# Patient Record
Sex: MALE | Race: WHITE | NOT HISPANIC OR LATINO | Employment: UNEMPLOYED | URBAN - NONMETROPOLITAN AREA
[De-identification: names, ages, dates, MRNs, and addresses within clinical notes are randomized per-mention and may not be internally consistent; named-entity substitution may affect disease eponyms.]

---

## 2024-06-11 ENCOUNTER — OFFICE VISIT (OUTPATIENT)
Dept: UROLOGY | Facility: CLINIC | Age: 59
End: 2024-06-11
Payer: COMMERCIAL

## 2024-06-11 VITALS
BODY MASS INDEX: 37.91 KG/M2 | DIASTOLIC BLOOD PRESSURE: 114 MMHG | HEART RATE: 104 BPM | SYSTOLIC BLOOD PRESSURE: 164 MMHG | HEIGHT: 70 IN | WEIGHT: 264.8 LBS

## 2024-06-11 DIAGNOSIS — R35.0 BENIGN PROSTATIC HYPERPLASIA WITH URINARY FREQUENCY: ICD-10-CM

## 2024-06-11 DIAGNOSIS — N40.1 BENIGN PROSTATIC HYPERPLASIA WITH URINARY FREQUENCY: ICD-10-CM

## 2024-06-11 DIAGNOSIS — R35.0 FREQUENCY OF MICTURITION: ICD-10-CM

## 2024-06-11 DIAGNOSIS — N40.1 BPH WITH OBSTRUCTION/LOWER URINARY TRACT SYMPTOMS: Primary | ICD-10-CM

## 2024-06-11 DIAGNOSIS — N13.8 BPH WITH OBSTRUCTION/LOWER URINARY TRACT SYMPTOMS: Primary | ICD-10-CM

## 2024-06-11 DIAGNOSIS — R33.9 INCOMPLETE EMPTYING OF BLADDER: ICD-10-CM

## 2024-06-11 DIAGNOSIS — N41.0 ACUTE PROSTATITIS WITH HEMATURIA: ICD-10-CM

## 2024-06-11 DIAGNOSIS — N39.0 RECURRENT UTI (URINARY TRACT INFECTION): ICD-10-CM

## 2024-06-11 LAB
BILIRUB BLD-MCNC: ABNORMAL MG/DL
CLARITY, POC: CLEAR
COLOR UR: ABNORMAL
EXPIRATION DATE: ABNORMAL
GLUCOSE UR STRIP-MCNC: NEGATIVE MG/DL
KETONES UR QL: ABNORMAL
LEUKOCYTE EST, POC: ABNORMAL
Lab: ABNORMAL
NITRITE UR-MCNC: NEGATIVE MG/ML
PH UR: 7.5 [PH] (ref 5–8)
PROT UR STRIP-MCNC: ABNORMAL MG/DL
RBC # UR STRIP: ABNORMAL /UL
SP GR UR: 1.01 (ref 1–1.03)
UROBILINOGEN UR QL: NORMAL

## 2024-06-11 PROCEDURE — 84154 ASSAY OF PSA FREE: CPT | Performed by: NURSE PRACTITIONER

## 2024-06-11 PROCEDURE — 84153 ASSAY OF PSA TOTAL: CPT | Performed by: NURSE PRACTITIONER

## 2024-06-11 PROCEDURE — 87086 URINE CULTURE/COLONY COUNT: CPT | Performed by: NURSE PRACTITIONER

## 2024-06-11 RX ORDER — CEFTRIAXONE 1 G/1
1 INJECTION, POWDER, FOR SOLUTION INTRAMUSCULAR; INTRAVENOUS ONCE
Status: COMPLETED | OUTPATIENT
Start: 2024-06-11 | End: 2024-06-11

## 2024-06-11 RX ORDER — ONDANSETRON 4 MG/1
4 TABLET, FILM COATED ORAL EVERY 12 HOURS PRN
Qty: 20 TABLET | Refills: 0 | Status: SHIPPED | OUTPATIENT
Start: 2024-06-11

## 2024-06-11 RX ORDER — SEMAGLUTIDE 0.68 MG/ML
INJECTION, SOLUTION SUBCUTANEOUS
COMMUNITY
Start: 2024-05-28

## 2024-06-11 RX ORDER — AMLODIPINE BESYLATE 10 MG/1
10 TABLET ORAL DAILY
COMMUNITY
Start: 2024-03-28

## 2024-06-11 RX ORDER — SERTRALINE HYDROCHLORIDE 100 MG/1
100 TABLET, FILM COATED ORAL DAILY
COMMUNITY
Start: 2013-04-15

## 2024-06-11 RX ORDER — HYDRALAZINE HYDROCHLORIDE 50 MG/1
50 TABLET, FILM COATED ORAL 3 TIMES DAILY
COMMUNITY

## 2024-06-11 RX ORDER — METHOCARBAMOL 500 MG/1
1 TABLET, FILM COATED ORAL 3 TIMES DAILY
COMMUNITY
Start: 2024-03-17

## 2024-06-11 RX ORDER — SULFAMETHOXAZOLE AND TRIMETHOPRIM 800; 160 MG/1; MG/1
1 TABLET ORAL 2 TIMES DAILY
Qty: 56 TABLET | Refills: 0 | Status: SHIPPED | OUTPATIENT
Start: 2024-06-11 | End: 2024-07-09

## 2024-06-11 RX ADMIN — CEFTRIAXONE 1 G: 1 INJECTION, POWDER, FOR SOLUTION INTRAMUSCULAR; INTRAVENOUS at 11:32

## 2024-06-11 NOTE — PROGRESS NOTES
Chief Complaint  E'COLI  RECURRENT UTI/ACUTE CYSTITIS/LEFT RENAL STONES (NEW PATIENT)    Subjective          Celestine Arriaga presents to [facility] for E'COLI  RECURRENT UTI/ACUTE CYSTITIS/LEFT RENAL STONES  History of Present Illness   History of Present Illness  The patient is a pleasant 58-year-old male who presents to clinic today for evaluation as a new patient consult. The patient has been referred to clinic by PCP with concerns of recurrent UTIs. On initial evaluation in clinic today, he is in apparent discomfort. The patient reports he is post multiple antibiotic therapy for E. coli positive UTIs. He states his symptoms have been ongoing and recently becoming very bothersome to him. Cystoscopic evaluation was recommended. Also, evaluation for colonoscopy was recommended secondary to concerns for sigmoid colon malignant process noted on his LAST CT 10/23.    OVERALL, The patient reports a general feeling of malaise. He has been diagnosed with infections and has been prescribed three different antibiotics, including Rocephin, Cipro, and Bactrim. His last antibiotic regimen was administered on Friday. Prior to the Rocephin injection, he was on Cipro, amoxicillin, and Bactrim, however reports only minimal improvement in his symptoms.     He experiences a burning sensation during urination and a sensation of incomplete bladder emptying during urination. His urine has an unusual color. He admits to inadequate water intake, primarily consuming beer and half a pot of coffee. He also reports perineal pain and lower back pain with increased difficulty maintaining any comfortable sitting position consistent with sepsis prostatitis.     He has previously undergone prostate examination and has undergone several cystoscopy procedures, both of which yielded normal results. He was not informed of any prostate enlargement. He has also undergone a colonoscopy, which yielded normal  "findings. He was diagnosed with diverticulitis.     He quantifies his current pain level as 10 out of 10. He has experienced nausea and is currently on a probiotic regimen.     His brother had kidney cancer. He is not aware of any family history of prostate cancer.    CT Abdomen Pelvis With Contrast 10/13/23  Complicated acute mid sigmoid colon diverticulitis with mild to moderate  surrounding inflammatory fat stranding. There is focal inflammatory fat  stranding between the inflamed mid sigmoid colon and adjacent urinary  bladder, concerning for possible colovesical fistula or secondary focal  urinary bladder inflammation/Cystitis. There is small rim-enhancing  fluid collection between the sigmoid colon and urinary bladder  (measuring up to 1.8 cm), best seen on series 604, image 57; series4,  image 84), concerning for small contained perforation/abscess or part of  the colovesical fistula. Underlying sigmoid color malignant process  cannot be excluded. Post-treatment colonoscopy evaluation is  recommended.    Nonobstructing left nephrolithiasis. No suspicious renal lesions.  Diffuse urinary bladder wall thickening, suggestive of cystitis, which  may be secondary to adjacent sigmoid diverticulitis/possible colovesical  fistula. Focal urinary bladder mass can not be excluded, post treatment  cystoscopic correlation is recommended.    Active Ambulatory Problems     Diagnosis Date Noted    Frequency of micturition 06/13/2024    Recurrent UTI (urinary tract infection) 06/13/2024    Acute prostatitis with hematuria 06/13/2024    Incomplete emptying of bladder 06/13/2024     Resolved Ambulatory Problems     Diagnosis Date Noted    No Resolved Ambulatory Problems     Past Medical History:   Diagnosis Date    Hypertension     Urinary tract infection       Objective   Vital Signs:   BP (!) 164/114   Pulse 104   Ht 177.8 cm (70\")   Wt 120 kg (264 lb 12.8 oz)   BMI 37.99 kg/m²       ROS:   Review of Systems "   Constitutional:  Positive for activity change, appetite change, chills, fatigue and unexpected weight gain. Negative for diaphoresis, fever and unexpected weight loss.   HENT:  Positive for nosebleeds. Negative for congestion, ear discharge, ear pain, rhinorrhea, sinus pressure and sore throat.    Eyes:  Negative for blurred vision, double vision, photophobia, pain, redness and visual disturbance.   Respiratory:  Negative for apnea, cough, chest tightness, shortness of breath, wheezing and stridor.    Cardiovascular:  Negative for chest pain and palpitations.   Gastrointestinal:  Negative for abdominal distention, abdominal pain, constipation, diarrhea, nausea and vomiting.   Endocrine: Negative for polydipsia, polyphagia and polyuria.   Genitourinary:  Positive for difficulty urinating, dysuria, flank pain, frequency, genital sores, hematuria, nocturia and urgency. Negative for decreased urine volume, discharge, penile pain, penile swelling, scrotal swelling, testicular pain and urinary incontinence.   Musculoskeletal:  Negative for arthralgias, back pain and joint swelling.   Skin:  Positive for color change and dry skin. Negative for pallor, rash and wound.   Neurological:  Negative for dizziness, tremors, syncope, weakness, light-headedness, memory problem and confusion.   Psychiatric/Behavioral:  Positive for sleep disturbance and stress. Negative for agitation, behavioral problems and decreased concentration.         Physical Exam  Constitutional:       General: He is in acute distress.      Appearance: He is well-developed. He is obese. He is ill-appearing.   HENT:      Head: Normocephalic and atraumatic.      Right Ear: External ear normal.      Left Ear: External ear normal.   Eyes:      General:         Right eye: No discharge.         Left eye: No discharge.      Conjunctiva/sclera: Conjunctivae normal.      Pupils: Pupils are equal, round, and reactive to light.   Neck:      Thyroid: No thyromegaly.       Trachea: No tracheal deviation.   Cardiovascular:      Rate and Rhythm: Normal rate and regular rhythm.      Heart sounds: No murmur heard.     No friction rub.   Pulmonary:      Effort: Pulmonary effort is normal. No respiratory distress.      Breath sounds: Normal breath sounds. No stridor.   Abdominal:      General: Bowel sounds are normal. There is no distension.      Palpations: Abdomen is soft.      Tenderness: There is no abdominal tenderness. There is no guarding.   Genitourinary:     Penis: Normal and uncircumcised. No tenderness or discharge.       Testes: Normal.      Rectum: Normal. Guaiac result negative.      Comments: CHAGO DEFERRED PER PT-Reports PERINEAL PAIN, dysuria, urinary frequency, urgency, constant burning with urination, incomplete bladder emptying.  Musculoskeletal:         General: No tenderness or deformity. Normal range of motion.      Cervical back: Normal range of motion and neck supple.   Skin:     General: Skin is warm and dry.      Capillary Refill: Capillary refill takes less than 2 seconds.      Coloration: Skin is not pale.   Neurological:      Mental Status: He is alert and oriented to person, place, and time.      Cranial Nerves: No cranial nerve deficit.      Coordination: Coordination normal.   Psychiatric:         Behavior: Behavior normal.         Thought Content: Thought content normal.         Judgment: Judgment normal.        Physical Exam    Result Review :     UA          6/11/2024    10:36   Urinalysis   Ketones, UA Trace    Leukocytes, UA Moderate (2+)      Urine Culture          6/11/2024    10:36   Urine Culture   Urine Culture No growth      PSA          6/11/2024    11:23   PSA   PSA 0.8             Assessment and Plan    Problem List Items Addressed This Visit          Genitourinary and Reproductive     Frequency of micturition - Primary    Relevant Medications    sulfamethoxazole-trimethoprim (Bactrim DS) 800-160 MG per tablet    ondansetron (Zofran) 4 MG  tablet    Other Relevant Orders    POC Urinalysis Dipstick, Automated (Completed)    Urine Culture - Urine, Urine, Random Void (Completed)    PSA Total+% Free (Serial) (Completed)    Recurrent UTI (urinary tract infection)    Relevant Medications    sulfamethoxazole-trimethoprim (Bactrim DS) 800-160 MG per tablet    ondansetron (Zofran) 4 MG tablet    Other Relevant Orders    PSA Total+% Free (Serial) (Completed)    Acute prostatitis with hematuria    Relevant Medications    sulfamethoxazole-trimethoprim (Bactrim DS) 800-160 MG per tablet    ondansetron (Zofran) 4 MG tablet    Other Relevant Orders    PSA Total+% Free (Serial) (Completed)    Incomplete emptying of bladder    Relevant Orders    Bladder Scan (Completed)       Assessment & Plan      ASSESSMENT  REC UTIs/ACUTE CYSTITIS/PROSTATITIS/BPH WITH LUTS:DYSURIA/FREQUENCY/URGENCY  MR TEDDY COOK  a pleasant 58-year-old male who presents to clinic today for evaluation with concerns of recurrent UTIs. On initial evaluation in clinic today, he is in apparent discomfort. EVEN THOUGH HE reports he is post multiple antibiotic therapy for E. coli positive UTIs    The patient reports a general feeling of malaise. He has been diagnosed with infections and has been prescribed three different antibiotics, including Rocephin, Cipro, and Bactrim. His last antibiotic regimen was administered on Friday. Prior to the Rocephin injection, he was on Cipro, amoxicillin, and Bactrim, however reports only minimal improvement in his symptoms. He experiences a burning sensation during urination and a sensation of incomplete bladder emptying during urination. His urine has an unusual color. He admits to inadequate water intake, primarily consuming beer and half a pot of coffee. He also reports perineal pain and lower back pain with increased difficulty maintaining any comfortable sitting position consistent with sepsis prostatitis.  His urinalysis in clinic today showed 2+ leukocyte  esterase, it is negative for nitrite, it is negative for gross but show trace microscopic hematuria, 1+ protein, 1+ bilirubin, ketones.  His PVR is 0 with an IPSS score of 16    BPH: WE Discussed the pathophysiology of BPH and obstruction. We discussed the static and dynamic effects of BPH as well as using 5 alpha reductase inhibitors versus alpha blockade.  We discussed the indications for transurethral surgery as well.  And/ or other therapeutic options available including all of the newer techniques.  He has no real symptomatology referable to his prostate other than moderate enlargement.  Rather than proceed with an expensive workup he doesn't need, at this time I am recommending an alpha blocker tamsulosin 0.4 mg capsule daily, and alpha reductase inhibitor-finasteride 5 mg daily    WE Discussed maximal medical therapy with finasteride and tamsulosin and how each medication works I explained that finasteride would reduce the size of the prostate by 20-30% reduce his PSA by 50% reduce the risks of either surgery or urinary retention by 50% and may reduce the risk of prostate cancer well-differentiated by 20-30% however he may increase the risk of poorly differentiated prostate cancer by half to 1%.  I also explained how  Flomax relaxes the prostate, and can often cause retrograde ejaculation.  Nevertheless, I am recommending that he start both these medications and return to see us  for follow-up.    We discussed the differential diagnosis of prostatitis including the National Institutes of Health classification system I through IV.  I discussed that type I prostatitis as  acute bacterial prostatitis and an associated with high fevers typically hematuria and severe pain with voiding.  We discussed the fact that it necessitates 6 weeks of chronic antibiotics to be sure it doesn't turn into a chronic bacterial prostatitis that can have lifelong ramifications.  We discussed National Institutes of Health type II  chronic bacterial prostatitis.  We discussed the fact that this a chronic bacterial infection of the prostate that often requires suppressive antibiotics. We discussed type III being related more to nonspecific urethritis as well as tight for being related to finding inflammation and a biopsy in the absence of symptomatology.  I discussed the diagnostic strategies including the VB 1 through 4 urine culture and discussed empiric therapy.    Recurrent urinary tract infections/OverActiveBladder:  He is in apparent distress and reports FEELING UNWELL.  We discussed the types of organisms that are found in the urinary tract indicating that the vast majority are results of the patient's own gastrointestinal daryn.  We discussed how many of the antibiotics that are utilized can actually exacerbate these infections by creating resistant organisms and there is only a very few antibiotics that are concentrated in the urine and do not affect the rectal reservoir nor cause recurrent yeast vaginitis.      We discussed the risk factors for recurrent infections being intercourse in younger patients and atrophic changes in older patients.  We discussed the symptoms that are found including pain, pressure, burning, frequency, urgency suprapubic pain and painful intercourse.  I discussed upper tract symptoms including fevers, chills, and indicated the workup would be much more aggressive if the patient were to present with recurrent infections in the face of upper tract symptomatology such as fever. I discussed the history of vesicoureteral reflux in young patients and finally chronic renal scarring as a result of such.         PLAN  We resent HIS urine for culture. I will call with results if any bacteria growth.    Will start Bactrim DS p.o. twice daily for at least 4 to 6 weeks-prostatitis     He has been encouraged to increase p.o. fluid intake to at least 1 to 2 L daily and avoid bladder irritants such as caffeine products,  spicy foods, and citrusy foods.     I recommend concomitant probiotics with treatment with antibiotics to protect the rectal reservoir including over-the-counter yogurt preparations to herbert oral pills containing the appropriate probiotics. Patient reports the diligent use of Probiotics.      Additionally, we discussed the various antibiotics used and the risks and benefits associated with each particularly the use of long-term ciprofloxacin and the effect on joints and collagen in human beings.  Discussed some home remedies for acute prostatitis and encouraged patient to engage in taking warm showers or baths as tolerated for comfort, avoiding activities that put pressure on the prostate such as bicycling, sitting on hard surfaces for longer.  Encourage sitting on a donut or  cushion, avoiding bladder irritants such as caffeine, alcohol, reducing or avoiding consumption of spicy foods, and increasing p.o. fluid intake to at least 2 to 3 L of water daily    Discussed upper and lower tract investigation.  Patient had an unremarkable CTA.  He will be scheduled for cystoscopy evaluation on 07/9/24 with Dr. Salazar-recurrent UTI/acute cystitis/BPH    Patient is agreeable plan of care, and will follow up in clinic as discussed.     1. Recurrent urinary tract infections (UTIs).BPH/PROSTATITIS SUMMARY  The differential diagnosis encompasses acute cystitis, E. coli, recurrent UTI, and acute prostatitis. Today, the patient received Rocephin 1 g intramuscularly. The initiation of antibiotic therapy tomorrow was discussed, with Bactrim to be taken twice daily for a minimum of 4 weeks, contingent upon the urine culture for self-sceptibility. A cystoscopic evaluation will be scheduled post-infection resolution with Dr. Salazar to evaluate the presence of a colovesicular fistula. Additionally, a free and total PSA test will be conducted today.    Follow-up  The patient is advised to return to the clinic sooner if  necessary.  Patient reports that he is not currently experiencing any symptoms of urinary incontinence.      Class 2 Severe Obesity (BMI >=35 and <=39.9). Obesity-related health conditions include the following: obstructive sleep apnea, hypertension, and coronary heart disease. Obesity is improving with lifestyle modifications. BMI is  37.99KGS . We discussed portion control and increasing exercise.      RADIOLOGY (CT AND/OR KUB):    CT Abdomen and Pelvis: No results found for this or any previous visit.     CT Stone Protocol: No results found for this or any previous visit.     KUB: No results found for this or any previous visit.       [unfilled]  LABS (3 MONTHS):    Office Visit on 06/11/2024   Component Date Value Ref Range Status    Color 06/11/2024 Gayathri  Yellow, Straw, Dark Yellow, Gayathri Final    Clarity, UA 06/11/2024 Clear  Clear Final    Specific Gravity  06/11/2024 1.010  1.005 - 1.030 Final    pH, Urine 06/11/2024 7.5  5.0 - 8.0 Final    Leukocytes 06/11/2024 Moderate (2+) (A)  Negative Final    Nitrite, UA 06/11/2024 Negative  Negative Final    Protein, POC 06/11/2024 1+ (A)  Negative mg/dL Final    Glucose, UA 06/11/2024 Negative  Negative mg/dL Final    Ketones, UA 06/11/2024 Trace (A)  Negative Final    Urobilinogen, UA 06/11/2024 Normal  Normal, 0.2 E.U./dL Final    Bilirubin 06/11/2024 Small (1+) (A)  Negative Final    Blood, UA 06/11/2024 Trace (A)  Negative Final    Lot Number 06/11/2024 [id number]   Final    Expiration Date 06/11/2024 10/25/24   Final    Urine Culture 06/11/2024 No growth   Final    PSA 06/11/2024 0.8  0.0 - 4.0 ng/mL Final    Roche ECLIA methodology.  According to the American Urological Association, Serum PSA should  decrease and remain at undetectable levels after radical  prostatectomy. The AUA defines biochemical recurrence as an initial  PSA value 0.2 ng/mL or greater followed by a subsequent confirmatory  PSA value 0.2 ng/mL or greater.  Values obtained with  different assay methods or kits cannot be used  interchangeably. Results cannot be interpreted as absolute evidence  of the presence or absence of malignant disease.    PSA, Free 06/11/2024 0.06  N/A ng/mL Final    Roche ECLIA methodology.    PSA, Free % 06/11/2024 7.5  % Final    The table below lists the probability of prostate cancer for  men with non-suspicious CHAGO results and total PSA between  4 and 10 ng/mL, by patient age (Romario et al, MADDIE 1998,  279:1542).                    % Free PSA       50-64 yr        65-75 yr                    0.00-10.00%        56%             55%                   10.01-15.00%        24%             35%                   15.01-20.00%        17%             23%                   20.01-25.00%        10%             20%                        >25.00%         5%              9%  Please note:  Romario et al did not make specific                recommendations regarding the use of                percent free PSA for any other population                of men.        IPSS Questionnaire (AUA-7):  Over the past month…    1)  How often have you had a sensation of not emptying your bladder completely after you finish urinating?  3 - About half the time   2)  How often have you had to urinate again less than two hours after you finished urinating? 0 - Not at all   3)  How often have you found you stopped and started again several times when you urinated?  2 - Less than half the time   4) How difficult have you found it to postpone urination?  1 - Less than 1 time in 5   5) How often have you had a weak urinary stream?  3 - About half the time   6) How often have you had to push or strain to begin urination?  3 - About half the time   7) How many times did you most typically get up to urinate from the time you went to bed until the time you got up in the morning?  4 - 4 times   Total Score:  16     Smoking Cessation Counseling:  Current every day smoker. less than 3 minutes spent  counseling. Will try to cut down.  I advised patient to quit tobacco use and offered support.  I provided patient with tobacco cessation educational material printed in the patient's After Visit Summary.       Follow Up   Return in about 4 weeks (around 7/9/2024) for Next scheduled follow up, With Dr. Salazar, FOR  BPH W LUTS/PSA/-PROSTATE CHAGO/MED REFILLS/LABS.    Patient was given instructions and counseling regarding his condition or for health maintenance advice. Please see specific information pulled into the AVS if appropriate.          This document has been electronically signed by Griselda Cheng-Akwa, APRN   June 13, 2024 13:35 EDT      Dictated Utilizing Dragon Dictation: Part of this note may be an electronic transcription/translation of spoken language to printed text using the Dragon Dictation System.      Patient or patient representative verbalized consent for the use of Ambient Listening during the visit with  Griselda Cheng-Akwa, APRN for chart documentation. 6/13/2024  13:35 EDT

## 2024-06-12 ENCOUNTER — TELEPHONE (OUTPATIENT)
Dept: UROLOGY | Facility: CLINIC | Age: 59
End: 2024-06-12
Payer: COMMERCIAL

## 2024-06-12 LAB — BACTERIA SPEC AEROBE CULT: NO GROWTH

## 2024-06-13 ENCOUNTER — TELEPHONE (OUTPATIENT)
Dept: UROLOGY | Facility: CLINIC | Age: 59
End: 2024-06-13
Payer: COMMERCIAL

## 2024-06-13 PROBLEM — R33.9 INCOMPLETE EMPTYING OF BLADDER: Status: ACTIVE | Noted: 2024-06-13

## 2024-06-13 PROBLEM — N39.0 RECURRENT UTI (URINARY TRACT INFECTION): Status: ACTIVE | Noted: 2024-06-13

## 2024-06-13 PROBLEM — N40.1 BPH WITH OBSTRUCTION/LOWER URINARY TRACT SYMPTOMS: Status: ACTIVE | Noted: 2024-06-13

## 2024-06-13 PROBLEM — N41.0 ACUTE PROSTATITIS WITH HEMATURIA: Status: ACTIVE | Noted: 2024-06-13

## 2024-06-13 PROBLEM — N13.8 BPH WITH OBSTRUCTION/LOWER URINARY TRACT SYMPTOMS: Status: ACTIVE | Noted: 2024-06-13

## 2024-06-13 PROBLEM — R35.0 BENIGN PROSTATIC HYPERPLASIA WITH URINARY FREQUENCY: Status: ACTIVE | Noted: 2024-06-13

## 2024-06-13 LAB
PSA FREE MFR SERPL: 7.5 %
PSA FREE SERPL-MCNC: 0.06 NG/ML
PSA SERPL-MCNC: 0.8 NG/ML (ref 0–4)

## 2024-06-13 NOTE — TELEPHONE ENCOUNTER
I called and left the pt a vm that his PSA was in normal range.and to keep his follow p with Marie      ----- Message from Griselda Cheng-Akwa sent at 6/13/2024 12:28 PM EDT -----    Let patient know his PSA results are 0.8 with a free PSA was 7.5 which is unremarkable at this time.  He should follow-up in clinic on 07/9 with Dr. Salazar  for  evaluation.

## 2024-07-09 ENCOUNTER — PROCEDURE VISIT (OUTPATIENT)
Dept: UROLOGY | Facility: CLINIC | Age: 59
End: 2024-07-09
Payer: COMMERCIAL

## 2024-07-09 ENCOUNTER — OFFICE VISIT (OUTPATIENT)
Dept: SURGERY | Facility: CLINIC | Age: 59
End: 2024-07-09
Payer: COMMERCIAL

## 2024-07-09 ENCOUNTER — PATIENT ROUNDING (BHMG ONLY) (OUTPATIENT)
Dept: SURGERY | Facility: CLINIC | Age: 59
End: 2024-07-09
Payer: COMMERCIAL

## 2024-07-09 VITALS
HEART RATE: 81 BPM | DIASTOLIC BLOOD PRESSURE: 90 MMHG | WEIGHT: 259.8 LBS | SYSTOLIC BLOOD PRESSURE: 150 MMHG | BODY MASS INDEX: 37.19 KG/M2 | HEIGHT: 70 IN

## 2024-07-09 VITALS — WEIGHT: 259 LBS | BODY MASS INDEX: 37.08 KG/M2 | HEIGHT: 70 IN

## 2024-07-09 DIAGNOSIS — E66.01 CLASS 2 SEVERE OBESITY DUE TO EXCESS CALORIES WITH SERIOUS COMORBIDITY AND BODY MASS INDEX (BMI) OF 37.0 TO 37.9 IN ADULT: Primary | ICD-10-CM

## 2024-07-09 DIAGNOSIS — Z48.816 AFTERCARE FOLLOWING SURGERY OF THE GENITOURINARY SYSTEM: Primary | ICD-10-CM

## 2024-07-09 DIAGNOSIS — N32.1 COLOVESICAL FISTULA: ICD-10-CM

## 2024-07-09 DIAGNOSIS — L98.8 FISTULA: Primary | ICD-10-CM

## 2024-07-09 PROBLEM — E66.812 CLASS 2 SEVERE OBESITY DUE TO EXCESS CALORIES WITH SERIOUS COMORBIDITY IN ADULT: Status: ACTIVE | Noted: 2024-07-09

## 2024-07-09 PROCEDURE — 99204 OFFICE O/P NEW MOD 45 MIN: CPT | Performed by: SURGERY

## 2024-07-09 RX ORDER — GENTAMICIN 40 MG/ML
80 INJECTION, SOLUTION INTRAMUSCULAR; INTRAVENOUS ONCE
Status: COMPLETED | OUTPATIENT
Start: 2024-07-09 | End: 2024-07-09

## 2024-07-09 RX ORDER — SODIUM, POTASSIUM,MAG SULFATES 17.5-3.13G
SOLUTION, RECONSTITUTED, ORAL ORAL
Qty: 175 ML | Refills: 0 | Status: SHIPPED | OUTPATIENT
Start: 2024-07-09

## 2024-07-09 RX ADMIN — GENTAMICIN 80 MG: 40 INJECTION, SOLUTION INTRAMUSCULAR; INTRAVENOUS at 10:26

## 2024-07-09 NOTE — PROGRESS NOTES
Chief Complaint:      Chief Complaint   Patient presents with    BPH with obstruction/lower urinary tract symptoms       HPI:   58 y.o. male for cystoscopy has a fistula and pneumaturia    Past Medical History:     Past Medical History:   Diagnosis Date    Hypertension     Urinary tract infection        Current Meds:     Current Outpatient Medications   Medication Sig Dispense Refill    amLODIPine (NORVASC) 10 MG tablet Take 1 tablet by mouth Daily.      hydrALAZINE (APRESOLINE) 50 MG tablet Take 1 tablet by mouth 3 (Three) Times a Day.      methocarbamol (ROBAXIN) 500 MG tablet Take 1 tablet by mouth 3 times a day.      ondansetron (Zofran) 4 MG tablet Take 1 tablet by mouth Every 12 (Twelve) Hours As Needed for Nausea. 20 tablet 0    Ozempic, 0.25 or 0.5 MG/DOSE, 2 MG/3ML solution pen-injector INJECT 0.25 MG  SUBCUTANEOUSLY ONCE A WEEK      sertraline (ZOLOFT) 100 MG tablet Take 1 tablet by mouth Daily.      sulfamethoxazole-trimethoprim (Bactrim DS) 800-160 MG per tablet Take 1 tablet by mouth 2 (Two) Times a Day for 28 days. 56 tablet 0     No current facility-administered medications for this visit.        Allergies:      Allergies   Allergen Reactions    Lisinopril Swelling        Past Surgical History:     Past Surgical History:   Procedure Laterality Date    BACK SURGERY      CAROTID STENT         Social History:     Social History     Socioeconomic History    Marital status:    Tobacco Use    Smoking status: Every Day     Types: Cigarettes     Passive exposure: Current    Smokeless tobacco: Never   Vaping Use    Vaping status: Never Used   Substance and Sexual Activity    Alcohol use: Never    Drug use: Never    Sexual activity: Defer       Family History:     Family History   Problem Relation Age of Onset    No Known Problems Father     Cancer Mother        Review of Systems:     Review of Systems   Constitutional: Negative.    HENT: Negative.     Eyes: Negative.    Respiratory: Negative.      Cardiovascular: Negative.    Gastrointestinal: Negative.    Endocrine: Negative.    Musculoskeletal: Negative.    Allergic/Immunologic: Negative.    Neurological: Negative.    Hematological: Negative.    Psychiatric/Behavioral: Negative.         Physical Exam:     Physical Exam  Vitals and nursing note reviewed.   Constitutional:       Appearance: He is well-developed.   HENT:      Head: Normocephalic and atraumatic.   Eyes:      Conjunctiva/sclera: Conjunctivae normal.      Pupils: Pupils are equal, round, and reactive to light.   Cardiovascular:      Rate and Rhythm: Normal rate and regular rhythm.      Heart sounds: Normal heart sounds.   Pulmonary:      Effort: Pulmonary effort is normal.      Breath sounds: Normal breath sounds.   Abdominal:      General: Bowel sounds are normal.      Palpations: Abdomen is soft.   Musculoskeletal:         General: Normal range of motion.      Cervical back: Normal range of motion.   Skin:     General: Skin is warm and dry.   Neurological:      Mental Status: He is alert and oriented to person, place, and time.      Deep Tendon Reflexes: Reflexes are normal and symmetric.   Psychiatric:         Behavior: Behavior normal.         Thought Content: Thought content normal.         Judgment: Judgment normal.         I have reviewed the following portions of the patient's history: Allergies, current medications, past family history, past medical history, past social history, past surgical history, problem list, and ROS and confirm it is accurate.    Recent Image (CT and/or KUB):      CT Abdomen and Pelvis: No results found for this or any previous visit.       CT Stone Protocol: No results found for this or any previous visit.       KUB: No results found for this or any previous visit.       Labs (past 3 months):      Office Visit on 06/11/2024   Component Date Value Ref Range Status    Color 06/11/2024 Gayathri  Yellow, Straw, Dark Yellow, Gayathri Final    Clarity, UA 06/11/2024 Clear   Clear Final    Specific Gravity  06/11/2024 1.010  1.005 - 1.030 Final    pH, Urine 06/11/2024 7.5  5.0 - 8.0 Final    Leukocytes 06/11/2024 Moderate (2+) (A)  Negative Final    Nitrite, UA 06/11/2024 Negative  Negative Final    Protein, POC 06/11/2024 1+ (A)  Negative mg/dL Final    Glucose, UA 06/11/2024 Negative  Negative mg/dL Final    Ketones, UA 06/11/2024 Trace (A)  Negative Final    Urobilinogen, UA 06/11/2024 Normal  Normal, 0.2 E.U./dL Final    Bilirubin 06/11/2024 Small (1+) (A)  Negative Final    Blood, UA 06/11/2024 Trace (A)  Negative Final    Lot Number 06/11/2024 [id number]   Final    Expiration Date 06/11/2024 10/25/24   Final    Urine Culture 06/11/2024 No growth   Final    PSA 06/11/2024 0.8  0.0 - 4.0 ng/mL Final    Roche ECLIA methodology.  According to the American Urological Association, Serum PSA should  decrease and remain at undetectable levels after radical  prostatectomy. The AUA defines biochemical recurrence as an initial  PSA value 0.2 ng/mL or greater followed by a subsequent confirmatory  PSA value 0.2 ng/mL or greater.  Values obtained with different assay methods or kits cannot be used  interchangeably. Results cannot be interpreted as absolute evidence  of the presence or absence of malignant disease.    PSA, Free 06/11/2024 0.06  N/A ng/mL Final    Roche ECLIA methodology.    PSA, Free % 06/11/2024 7.5  % Final    The table below lists the probability of prostate cancer for  men with non-suspicious CHAGO results and total PSA between  4 and 10 ng/mL, by patient age (Romario et al, MADDIE 1998,  279:1542).                    % Free PSA       50-64 yr        65-75 yr                    0.00-10.00%        56%             55%                   10.01-15.00%        24%             35%                   15.01-20.00%        17%             23%                   20.01-25.00%        10%             20%                        >25.00%         5%              9%  Please note:  Romario et al  did not make specific                recommendations regarding the use of                percent free PSA for any other population                of men.        Procedure:   Cystoscopy:  Patient presents today for cystourethroscopy.  I went ahead and obtained an informed consent including the risks of anesthesia, bleeding, infection, etc.  After prepping and draping in a sterile fashion in the low dorsal lithotomy position, the urethra was gently anesthetized with 10 mL of 2% viscous Xylocaine jelly.  After an appropriate period of topical anesthesia, I used the Olympus digital 14 [place]ese flexible cystoscope to examine the anterior urethra which was completely normal.  The ureteral orifices were visualized and normal in position and configuration. There were no stones, tumors, or foreign bodies.  Has an obvious fistula towards the dome the patient was given 80 mg of gentamicin in an intramuscular fashion as prophylaxis for the cystoscopy and released from the clinic.    Assessment/Plan:   Colovesical fistula emergent referral to Dr. Kahn          This document has been electronically signed by MATTY FISCHER MD July 9, 2024 10:10 EDT    Dictated Utilizing Dragon Dictation: Part of this note may be an electronic transcription/translation of spoken language to printed text using the Dragon Dictation System.

## 2024-07-09 NOTE — PROGRESS NOTES
July 9, 2024    Hello, may I speak with Celestine Arriaga?    My name is levar      I am  with [facility]  [address] [place] [address] [phone].    Before we get started may I verify your date of birth? 1965    I am calling to officially welcome you to our practice and ask about your recent visit. Is this a good time to talk? yes    Tell me about your visit with us. What things went well?  will be having a colonoscopy       We're always looking for ways to make our patients' experiences even better. Do you have recommendations on ways we may improve?  no    Overall were you satisfied with your first visit to our practice? yes       I appreciate you taking the time to speak with me today. Is there anything else I can do for you? no      Thank you, and have a great day.

## 2024-07-09 NOTE — H&P (VIEW-ONLY)
Subjective   Celestine Arriaga is a 58 y.o. male.     Chief Complaint: colovesical fistula    History of Present Illness He is a obese 59 yo who has had recurrent UTIs for months. He had a colonoscopy reportedly a year ago that did not see anything but on a recent cystoscopy a likely fistula seen. Also he had a CT 10/13 that showed a possible abscess between the colon and the bladder.     The following portions of the patient's history were reviewed and updated as appropriate: current medications, past family history, past medical history, past social history, past surgical history and problem list.    Review of Systems   Constitutional:  Negative for activity change, appetite change, chills, fever and unexpected weight change.   HENT:  Negative for congestion, facial swelling and sore throat.    Eyes:  Negative for photophobia and visual disturbance.   Respiratory:  Negative for chest tightness, shortness of breath and wheezing.    Cardiovascular:  Negative for chest pain, palpitations and leg swelling.   Gastrointestinal:  Positive for abdominal pain. Negative for abdominal distention, anal bleeding, blood in stool, constipation, diarrhea, nausea, rectal pain and vomiting.   Endocrine: Negative for cold intolerance, heat intolerance, polydipsia and polyuria.   Genitourinary:  Positive for dysuria, frequency and urgency. Negative for difficulty urinating and flank pain.   Musculoskeletal:  Negative for back pain and myalgias.   Skin:  Negative for rash and wound.   Allergic/Immunologic: Negative for immunocompromised state.   Neurological:  Negative for dizziness, seizures, syncope, light-headedness, numbness and headaches.   Hematological:  Negative for adenopathy. Does not bruise/bleed easily.   Psychiatric/Behavioral:  Negative for behavioral problems and confusion. The patient is not nervous/anxious.        Objective   Physical Exam  Vitals reviewed.   Constitutional:       General: He is not in acute distress.      Appearance: He is well-developed. He is not ill-appearing.   HENT:      Head: Normocephalic. No laceration. Hair is normal.      Right Ear: Hearing and ear canal normal.      Left Ear: Hearing and ear canal normal.      Nose: Nose normal.      Right Sinus: No maxillary sinus tenderness or frontal sinus tenderness.      Left Sinus: No maxillary sinus tenderness or frontal sinus tenderness.   Eyes:      General: Lids are normal.      Conjunctiva/sclera: Conjunctivae normal.      Pupils: Pupils are equal, round, and reactive to light.   Neck:      Thyroid: No thyroid mass or thyromegaly.      Vascular: No JVD.      Trachea: No tracheal tenderness or tracheal deviation.   Cardiovascular:      Rate and Rhythm: Normal rate and regular rhythm.      Heart sounds: No murmur heard.     No gallop.   Pulmonary:      Effort: Pulmonary effort is normal.      Breath sounds: Normal breath sounds. No stridor. No wheezing.   Chest:      Chest wall: No tenderness.   Abdominal:      General: Bowel sounds are normal. There is no distension.      Palpations: Abdomen is soft. There is no mass.      Tenderness: There is abdominal tenderness. There is no guarding or rebound.      Hernia: No hernia is present.   Musculoskeletal:         General: No deformity.      Cervical back: Normal range of motion.   Lymphadenopathy:      Cervical: No cervical adenopathy.      Upper Body:      Right upper body: No supraclavicular adenopathy.      Left upper body: No supraclavicular adenopathy.   Skin:     General: Skin is warm and dry.      Coloration: Skin is not pale.      Findings: No erythema or rash.   Neurological:      Mental Status: He is alert and oriented to person, place, and time.      Motor: No abnormal muscle tone.   Psychiatric:         Behavior: Behavior normal.         Thought Content: Thought content normal.         Past Medical History:   Diagnosis Date    Hypertension     Urinary tract infection        Family History   Problem  Relation Age of Onset    No Known Problems Father     Cancer Mother        Social History     Tobacco Use    Smoking status: Every Day     Types: Cigarettes     Passive exposure: Current    Smokeless tobacco: Never   Vaping Use    Vaping status: Never Used   Substance Use Topics    Alcohol use: Never    Drug use: Never       Past Surgical History:   Procedure Laterality Date    BACK SURGERY      CAROTID STENT         Current Outpatient Medications   Medication Instructions    amLODIPine (NORVASC) 10 mg, Oral, Daily    hydrALAZINE (APRESOLINE) 50 mg, Oral, 3 Times Daily    methocarbamol (ROBAXIN) 500 MG tablet 1 tablet, Oral, 3 times daily    ondansetron (ZOFRAN) 4 mg, Oral, Every 12 Hours PRN    Ozempic, 0.25 or 0.5 MG/DOSE, 2 MG/3ML solution pen-injector INJECT 0.25 MG  SUBCUTANEOUSLY ONCE A WEEK    sertraline (ZOLOFT) 100 mg, Oral, Daily    sulfamethoxazole-trimethoprim (Bactrim DS) 800-160 MG per tablet 1 tablet, Oral, 2 Times Daily         Assessment & Plan   Diagnoses and all orders for this visit:    1. Class 2 severe obesity due to excess calories with serious comorbidity and body mass index (BMI) of 37.0 to 37.9 in adult (Primary)    2. Colovesical fistula    Repeat the colonoscopy and if no other changes schedule sigmoid resection             This document has been electronically signed by Boogie Hays MD   July 9, 2024 11:43 EDT

## 2024-07-10 PROBLEM — N32.1 COLOVESICAL FISTULA: Status: ACTIVE | Noted: 2024-07-10

## 2024-07-18 ENCOUNTER — HOSPITAL ENCOUNTER (OUTPATIENT)
Facility: HOSPITAL | Age: 59
Setting detail: HOSPITAL OUTPATIENT SURGERY
Discharge: HOME | End: 2024-07-18
Attending: SURGERY | Admitting: SURGERY
Payer: COMMERCIAL

## 2024-07-18 ENCOUNTER — ANESTHESIA EVENT (OUTPATIENT)
Dept: PERIOP | Facility: HOSPITAL | Age: 59
End: 2024-07-18
Payer: COMMERCIAL

## 2024-07-18 ENCOUNTER — ANESTHESIA (OUTPATIENT)
Dept: PERIOP | Facility: HOSPITAL | Age: 59
End: 2024-07-18
Payer: COMMERCIAL

## 2024-07-18 VITALS
TEMPERATURE: 97.7 F | RESPIRATION RATE: 18 BRPM | HEIGHT: 70 IN | DIASTOLIC BLOOD PRESSURE: 90 MMHG | OXYGEN SATURATION: 95 % | WEIGHT: 260 LBS | HEART RATE: 64 BPM | BODY MASS INDEX: 37.22 KG/M2 | SYSTOLIC BLOOD PRESSURE: 124 MMHG

## 2024-07-18 DIAGNOSIS — N32.1 COLOVESICAL FISTULA: Primary | ICD-10-CM

## 2024-07-18 LAB — GLUCOSE BLDC GLUCOMTR-MCNC: 138 MG/DL (ref 70–130)

## 2024-07-18 PROCEDURE — 25810000003 LACTATED RINGERS PER 1000 ML: Performed by: NURSE ANESTHETIST, CERTIFIED REGISTERED

## 2024-07-18 PROCEDURE — 82948 REAGENT STRIP/BLOOD GLUCOSE: CPT

## 2024-07-18 PROCEDURE — 25010000002 PROPOFOL 10 MG/ML EMULSION: Performed by: NURSE ANESTHETIST, CERTIFIED REGISTERED

## 2024-07-18 PROCEDURE — 45378 DIAGNOSTIC COLONOSCOPY: CPT | Performed by: SURGERY

## 2024-07-18 RX ORDER — SODIUM CHLORIDE 9 MG/ML
40 INJECTION, SOLUTION INTRAVENOUS AS NEEDED
Status: DISCONTINUED | OUTPATIENT
Start: 2024-07-18 | End: 2024-07-18 | Stop reason: HOSPADM

## 2024-07-18 RX ORDER — MIDAZOLAM HYDROCHLORIDE 1 MG/ML
1 INJECTION INTRAMUSCULAR; INTRAVENOUS
Status: DISCONTINUED | OUTPATIENT
Start: 2024-07-18 | End: 2024-07-18 | Stop reason: HOSPADM

## 2024-07-18 RX ORDER — SODIUM CHLORIDE 0.9 % (FLUSH) 0.9 %
10 SYRINGE (ML) INJECTION AS NEEDED
Status: DISCONTINUED | OUTPATIENT
Start: 2024-07-18 | End: 2024-07-18 | Stop reason: HOSPADM

## 2024-07-18 RX ORDER — ALVIMOPAN 12 MG/1
12 CAPSULE ORAL ONCE
Status: DISCONTINUED | OUTPATIENT
Start: 2024-07-18 | End: 2024-07-18 | Stop reason: HOSPADM

## 2024-07-18 RX ORDER — IPRATROPIUM BROMIDE AND ALBUTEROL SULFATE 2.5; .5 MG/3ML; MG/3ML
3 SOLUTION RESPIRATORY (INHALATION) ONCE AS NEEDED
Status: DISCONTINUED | OUTPATIENT
Start: 2024-07-18 | End: 2024-07-18 | Stop reason: HOSPADM

## 2024-07-18 RX ORDER — SODIUM CHLORIDE, SODIUM LACTATE, POTASSIUM CHLORIDE, CALCIUM CHLORIDE 600; 310; 30; 20 MG/100ML; MG/100ML; MG/100ML; MG/100ML
100 INJECTION, SOLUTION INTRAVENOUS ONCE AS NEEDED
Status: DISCONTINUED | OUTPATIENT
Start: 2024-07-18 | End: 2024-07-18 | Stop reason: HOSPADM

## 2024-07-18 RX ORDER — FENTANYL CITRATE 50 UG/ML
50 INJECTION, SOLUTION INTRAMUSCULAR; INTRAVENOUS
Status: DISCONTINUED | OUTPATIENT
Start: 2024-07-18 | End: 2024-07-18 | Stop reason: HOSPADM

## 2024-07-18 RX ORDER — SODIUM CHLORIDE 0.9 % (FLUSH) 0.9 %
10 SYRINGE (ML) INJECTION EVERY 12 HOURS SCHEDULED
Status: DISCONTINUED | OUTPATIENT
Start: 2024-07-18 | End: 2024-07-18 | Stop reason: HOSPADM

## 2024-07-18 RX ORDER — ONDANSETRON 2 MG/ML
4 INJECTION INTRAMUSCULAR; INTRAVENOUS AS NEEDED
Status: DISCONTINUED | OUTPATIENT
Start: 2024-07-18 | End: 2024-07-18 | Stop reason: HOSPADM

## 2024-07-18 RX ORDER — SODIUM CHLORIDE, SODIUM LACTATE, POTASSIUM CHLORIDE, CALCIUM CHLORIDE 600; 310; 30; 20 MG/100ML; MG/100ML; MG/100ML; MG/100ML
INJECTION, SOLUTION INTRAVENOUS CONTINUOUS PRN
Status: DISCONTINUED | OUTPATIENT
Start: 2024-07-18 | End: 2024-07-18 | Stop reason: SURG

## 2024-07-18 RX ORDER — CARVEDILOL 25 MG/1
25 TABLET ORAL 2 TIMES DAILY WITH MEALS
COMMUNITY

## 2024-07-18 RX ORDER — SODIUM CHLORIDE, SODIUM LACTATE, POTASSIUM CHLORIDE, CALCIUM CHLORIDE 600; 310; 30; 20 MG/100ML; MG/100ML; MG/100ML; MG/100ML
125 INJECTION, SOLUTION INTRAVENOUS ONCE
Status: DISCONTINUED | OUTPATIENT
Start: 2024-07-18 | End: 2024-07-18 | Stop reason: HOSPADM

## 2024-07-18 RX ORDER — PROPOFOL 10 MG/ML
VIAL (ML) INTRAVENOUS AS NEEDED
Status: DISCONTINUED | OUTPATIENT
Start: 2024-07-18 | End: 2024-07-18 | Stop reason: SURG

## 2024-07-18 RX ADMIN — PROPOFOL 150 MG: 10 INJECTION, EMULSION INTRAVENOUS at 08:14

## 2024-07-18 RX ADMIN — PROPOFOL 50 MG: 10 INJECTION, EMULSION INTRAVENOUS at 08:18

## 2024-07-18 RX ADMIN — PROPOFOL 50 MG: 10 INJECTION, EMULSION INTRAVENOUS at 08:16

## 2024-07-18 RX ADMIN — SODIUM CHLORIDE, POTASSIUM CHLORIDE, SODIUM LACTATE AND CALCIUM CHLORIDE: 600; 310; 30; 20 INJECTION, SOLUTION INTRAVENOUS at 08:09

## 2024-07-18 RX ADMIN — PROPOFOL 50 MG: 10 INJECTION, EMULSION INTRAVENOUS at 08:22

## 2024-07-18 RX ADMIN — PROPOFOL 50 MG: 10 INJECTION, EMULSION INTRAVENOUS at 08:20

## 2024-07-18 NOTE — ANESTHESIA PREPROCEDURE EVALUATION
Anesthesia Evaluation     Patient summary reviewed and Nursing notes reviewed   no history of anesthetic complications:                Airway   Mallampati: II  TM distance: >3 FB  Neck ROM: full  No difficulty expected  Dental - normal exam   (+) poor dentition    Pulmonary - normal exam   (+) COPD,  Cardiovascular - normal exam  Exercise tolerance: good (4-7 METS)    NYHA Classification: II    (+) hypertension, CAD, hyperlipidemia      Neuro/Psych- negative ROS  GI/Hepatic/Renal/Endo    (+) obesity, morbid obesity, GERD, diabetes mellitus    Musculoskeletal     Abdominal  - normal exam    Bowel sounds: normal.   Substance History - negative use     OB/GYN negative ob/gyn ROS         Other   arthritis,                       Anesthesia Plan    ASA 3     general     intravenous induction           CODE STATUS:

## 2024-07-18 NOTE — ANESTHESIA POSTPROCEDURE EVALUATION
Patient: Celestine Arriaga    Procedure Summary       Date: 07/18/24 Room / Location:  [facility] 07 /  [facility]    Anesthesia Start: 0809 Anesthesia Stop: 0825    Procedure: COLONOSCOPY Diagnosis:       Colovesical fistula      (Colovesical fistula [N32.1])    Surgeons: Boogie Hays MD Provider: Ibrahima Mcneil DO    Anesthesia Type: general ASA Status: 3            Anesthesia Type: general    Vitals  Vitals Value Taken Time   /90 07/18/24 0855   Temp 97.7 °F (36.5 °C) 07/18/24 0825   Pulse 64 07/18/24 0855   Resp 18 07/18/24 0855   SpO2 95 % 07/18/24 0855           Post Anesthesia Care and Evaluation    Patient location during evaluation: PACU  Patient participation: complete - patient participated  Level of consciousness: awake  Pain score: 1  Pain management: adequate    Airway patency: patent  Anesthetic complications: No anesthetic complications  PONV Status: controlled  Cardiovascular status: acceptable and blood pressure returned to baseline  Respiratory status: acceptable and room air  Hydration status: acceptable    Comments: Patient comfortable with discharge at this time.

## 2024-07-18 NOTE — OP NOTE
COLONOSCOPY  Procedure Note    Celestine Arriaga  7/18/2024    Pre-op Diagnosis:   Colovesical fistula [N32.1]    Post-op Diagnosis: same, diverticulosis        Procedure(s):  COLONOSCOPY    Surgeon(s):  Boogie Hays MD    Anesthesia: General    Staff:   Circulator: Teri Mast RN  Endo Technician: Rios Corral    Estimated Blood Loss: none    Specimens:                * No orders in the log *      Drains: * No LDAs found *    Procedure: The scope passed from the rectum to about 40 cm, but I could not get past this point. The prep was good and there was no acute inflammation in the part that was seen. No polyps or tumors.     Findings: diverticulosis, unable to pass 40 cm           Complications: none   Grafts / Implants N/A    Boogie Hays MD     Date: 7/18/2024  Time: 08:42 EDT

## 2024-07-25 ENCOUNTER — OFFICE VISIT (OUTPATIENT)
Dept: SURGERY | Facility: CLINIC | Age: 59
End: 2024-07-25
Payer: COMMERCIAL

## 2024-07-25 VITALS — WEIGHT: 260 LBS | BODY MASS INDEX: 37.22 KG/M2 | HEIGHT: 70 IN

## 2024-07-25 DIAGNOSIS — N32.1 COLOVESICAL FISTULA: Primary | ICD-10-CM

## 2024-07-25 PROCEDURE — 99213 OFFICE O/P EST LOW 20 MIN: CPT | Performed by: SURGERY

## 2024-07-25 RX ORDER — ERYTHROMYCIN 500 MG/1
TABLET, COATED ORAL
Qty: 6 TABLET | Refills: 0 | Status: SHIPPED | OUTPATIENT
Start: 2024-07-25

## 2024-07-25 RX ORDER — NEOMYCIN SULFATE 500 MG/1
1000 TABLET ORAL 3 TIMES DAILY
Qty: 6 TABLET | Refills: 0 | Status: SHIPPED | OUTPATIENT
Start: 2024-07-25 | End: 2024-07-26

## 2024-07-25 RX ORDER — POLYETHYLENE GLYCOL 3350, SODIUM SULFATE ANHYDROUS, SODIUM BICARBONATE, SODIUM CHLORIDE, POTASSIUM CHLORIDE 236; 22.74; 6.74; 5.86; 2.97 G/4L; G/4L; G/4L; G/4L; G/4L
4 POWDER, FOR SOLUTION ORAL ONCE
Qty: 1 ML | Refills: 0 | Status: SHIPPED | OUTPATIENT
Start: 2024-07-25 | End: 2024-07-25

## 2024-07-25 NOTE — H&P (VIEW-ONLY)
Subjective   Celestine Arriaga is a 58 y.o. male.     Chief Complaint: colovesical fistula    History of Present Illness He is a 57 yo who has had UTIs over the last year and had what appeared to be  colovesical fistula on cystoscopy. He had a colonoscopy but the scope would not pass about 40 cm. No tumor or acute inflammation was seen in the colon that could be seen.     The following portions of the patient's history were reviewed and updated as appropriate: current medications, past family history, past medical history, past social history, past surgical history and problem list.    Review of Systems   Constitutional:  Negative for activity change, appetite change, chills, fever and unexpected weight change.   HENT:  Negative for congestion, facial swelling and sore throat.    Eyes:  Negative for photophobia and visual disturbance.   Respiratory:  Negative for chest tightness, shortness of breath and wheezing.    Cardiovascular:  Negative for chest pain, palpitations and leg swelling.   Gastrointestinal:  Positive for abdominal pain. Negative for abdominal distention, anal bleeding, blood in stool, constipation, diarrhea, nausea, rectal pain and vomiting.   Endocrine: Negative for cold intolerance, heat intolerance, polydipsia and polyuria.   Genitourinary:  Positive for dysuria and urgency. Negative for difficulty urinating and flank pain.   Musculoskeletal:  Negative for back pain and myalgias.   Skin:  Negative for rash and wound.   Allergic/Immunologic: Negative for immunocompromised state.   Neurological:  Negative for dizziness, seizures, syncope, light-headedness, numbness and headaches.   Hematological:  Negative for adenopathy. Does not bruise/bleed easily.   Psychiatric/Behavioral:  Negative for behavioral problems and confusion. The patient is not nervous/anxious.        Objective   Physical Exam  Vitals reviewed.   Constitutional:       General: He is not in acute distress.     Appearance: He is  well-developed. He is not ill-appearing.   HENT:      Head: Normocephalic. No laceration. Hair is normal.      Right Ear: Hearing and ear canal normal.      Left Ear: Hearing and ear canal normal.      Nose: Nose normal.      Right Sinus: No maxillary sinus tenderness or frontal sinus tenderness.      Left Sinus: No maxillary sinus tenderness or frontal sinus tenderness.   Eyes:      General: Lids are normal.      Conjunctiva/sclera: Conjunctivae normal.      Pupils: Pupils are equal, round, and reactive to light.   Neck:      Thyroid: No thyroid mass or thyromegaly.      Vascular: No JVD.      Trachea: No tracheal tenderness or tracheal deviation.   Cardiovascular:      Rate and Rhythm: Normal rate and regular rhythm.      Heart sounds: No murmur heard.     No gallop.   Pulmonary:      Effort: Pulmonary effort is normal.      Breath sounds: Normal breath sounds. No stridor. No wheezing.   Chest:      Chest wall: No tenderness.   Abdominal:      General: Bowel sounds are normal. There is no distension.      Palpations: Abdomen is soft. There is no mass.      Tenderness: There is abdominal tenderness. There is no guarding or rebound.      Hernia: No hernia is present.   Musculoskeletal:         General: No deformity.      Cervical back: Normal range of motion.   Lymphadenopathy:      Cervical: No cervical adenopathy.      Upper Body:      Right upper body: No supraclavicular adenopathy.      Left upper body: No supraclavicular adenopathy.   Skin:     General: Skin is warm and dry.      Coloration: Skin is not pale.      Findings: No erythema or rash.   Neurological:      Mental Status: He is alert and oriented to person, place, and time.      Motor: No abnormal muscle tone.   Psychiatric:         Behavior: Behavior normal.         Thought Content: Thought content normal.         Past Medical History:   Diagnosis Date    Arthritis     COPD (chronic obstructive pulmonary disease)     Coronary artery disease      Diabetes mellitus     Elevated cholesterol     GERD (gastroesophageal reflux disease)     Hypertension     Urinary tract infection        Family History   Problem Relation Age of Onset    No Known Problems Father     Cancer Mother        Social History     Tobacco Use    Smoking status: Every Day     Current packs/day: 1.50     Average packs/day: 1.5 packs/day for 40.6 years (60.8 ttl pk-yrs)     Types: Cigarettes     Start date: 1984     Passive exposure: Current    Smokeless tobacco: Never   Vaping Use    Vaping status: Never Used   Substance Use Topics    Alcohol use: Yes     Alcohol/week: 3.0 standard drinks of alcohol     Types: 3 Cans of beer per week     Comment: 2-3 beers a night    Drug use: Never       Past Surgical History:   Procedure Laterality Date    BACK SURGERY      CAROTID STENT      COLONOSCOPY      COLONOSCOPY N/A 7/18/2024    Procedure: COLONOSCOPY;  Surgeon: Boogie Hays MD;  Location: [facility];  Service: Gastroenterology;  Laterality: N/A;    CORONARY ANGIOPLASTY WITH STENT PLACEMENT         Current Outpatient Medications   Medication Instructions    amLODIPine (NORVASC) 10 mg, Oral, Daily    carvedilol (COREG) 25 mg, Oral, 2 Times Daily With Meals    hydrALAZINE (APRESOLINE) 50 mg, Oral, 3 Times Daily    methocarbamol (ROBAXIN) 500 MG tablet 1 tablet, Oral, 3 times daily    ondansetron (ZOFRAN) 4 mg, Oral, Every 12 Hours PRN    Ozempic, 0.25 or 0.5 MG/DOSE, 2 MG/3ML solution pen-injector INJECT 0.25 MG  SUBCUTANEOUSLY ONCE A WEEK    sertraline (ZOLOFT) 100 mg, Oral, Daily    sodium-potassium-magnesium sulfates (Suprep Bowel Prep Kit) 17.5-3.13-1.6 GM/177ML solution oral solution Dispense one Kit        Sig: Used as Directed         Assessment & Plan   Diagnoses and all orders for this visit:    1. Colovesical fistula (Primary)    He has been scheduled for a sigmoid resection and bladder repair in conjunction with urology.             This document has been electronically signed by Boogie  ASHTYN Hays MD   July 25, 2024 13:29 EDT

## 2024-07-25 NOTE — PROGRESS NOTES
Subjective   Celestine Arriaga is a 58 y.o. male.     Chief Complaint: colovesical fistula    History of Present Illness He is a 59 yo who has had UTIs over the last year and had what appeared to be  colovesical fistula on cystoscopy. He had a colonoscopy but the scope would not pass about 40 cm. No tumor or acute inflammation was seen in the colon that could be seen.     The following portions of the patient's history were reviewed and updated as appropriate: current medications, past family history, past medical history, past social history, past surgical history and problem list.    Review of Systems   Constitutional:  Negative for activity change, appetite change, chills, fever and unexpected weight change.   HENT:  Negative for congestion, facial swelling and sore throat.    Eyes:  Negative for photophobia and visual disturbance.   Respiratory:  Negative for chest tightness, shortness of breath and wheezing.    Cardiovascular:  Negative for chest pain, palpitations and leg swelling.   Gastrointestinal:  Positive for abdominal pain. Negative for abdominal distention, anal bleeding, blood in stool, constipation, diarrhea, nausea, rectal pain and vomiting.   Endocrine: Negative for cold intolerance, heat intolerance, polydipsia and polyuria.   Genitourinary:  Positive for dysuria and urgency. Negative for difficulty urinating and flank pain.   Musculoskeletal:  Negative for back pain and myalgias.   Skin:  Negative for rash and wound.   Allergic/Immunologic: Negative for immunocompromised state.   Neurological:  Negative for dizziness, seizures, syncope, light-headedness, numbness and headaches.   Hematological:  Negative for adenopathy. Does not bruise/bleed easily.   Psychiatric/Behavioral:  Negative for behavioral problems and confusion. The patient is not nervous/anxious.        Objective   Physical Exam  Vitals reviewed.   Constitutional:       General: He is not in acute distress.     Appearance: He is  well-developed. He is not ill-appearing.   HENT:      Head: Normocephalic. No laceration. Hair is normal.      Right Ear: Hearing and ear canal normal.      Left Ear: Hearing and ear canal normal.      Nose: Nose normal.      Right Sinus: No maxillary sinus tenderness or frontal sinus tenderness.      Left Sinus: No maxillary sinus tenderness or frontal sinus tenderness.   Eyes:      General: Lids are normal.      Conjunctiva/sclera: Conjunctivae normal.      Pupils: Pupils are equal, round, and reactive to light.   Neck:      Thyroid: No thyroid mass or thyromegaly.      Vascular: No JVD.      Trachea: No tracheal tenderness or tracheal deviation.   Cardiovascular:      Rate and Rhythm: Normal rate and regular rhythm.      Heart sounds: No murmur heard.     No gallop.   Pulmonary:      Effort: Pulmonary effort is normal.      Breath sounds: Normal breath sounds. No stridor. No wheezing.   Chest:      Chest wall: No tenderness.   Abdominal:      General: Bowel sounds are normal. There is no distension.      Palpations: Abdomen is soft. There is no mass.      Tenderness: There is abdominal tenderness. There is no guarding or rebound.      Hernia: No hernia is present.   Musculoskeletal:         General: No deformity.      Cervical back: Normal range of motion.   Lymphadenopathy:      Cervical: No cervical adenopathy.      Upper Body:      Right upper body: No supraclavicular adenopathy.      Left upper body: No supraclavicular adenopathy.   Skin:     General: Skin is warm and dry.      Coloration: Skin is not pale.      Findings: No erythema or rash.   Neurological:      Mental Status: He is alert and oriented to person, place, and time.      Motor: No abnormal muscle tone.   Psychiatric:         Behavior: Behavior normal.         Thought Content: Thought content normal.         Past Medical History:   Diagnosis Date    Arthritis     COPD (chronic obstructive pulmonary disease)     Coronary artery disease      Diabetes mellitus     Elevated cholesterol     GERD (gastroesophageal reflux disease)     Hypertension     Urinary tract infection        Family History   Problem Relation Age of Onset    No Known Problems Father     Cancer Mother        Social History     Tobacco Use    Smoking status: Every Day     Current packs/day: 1.50     Average packs/day: 1.5 packs/day for 40.6 years (60.8 ttl pk-yrs)     Types: Cigarettes     Start date: 1984     Passive exposure: Current    Smokeless tobacco: Never   Vaping Use    Vaping status: Never Used   Substance Use Topics    Alcohol use: Yes     Alcohol/week: 3.0 standard drinks of alcohol     Types: 3 Cans of beer per week     Comment: 2-3 beers a night    Drug use: Never       Past Surgical History:   Procedure Laterality Date    BACK SURGERY      CAROTID STENT      COLONOSCOPY      COLONOSCOPY N/A 7/18/2024    Procedure: COLONOSCOPY;  Surgeon: Boogie Hays MD;  Location: [facility];  Service: Gastroenterology;  Laterality: N/A;    CORONARY ANGIOPLASTY WITH STENT PLACEMENT         Current Outpatient Medications   Medication Instructions    amLODIPine (NORVASC) 10 mg, Oral, Daily    carvedilol (COREG) 25 mg, Oral, 2 Times Daily With Meals    hydrALAZINE (APRESOLINE) 50 mg, Oral, 3 Times Daily    methocarbamol (ROBAXIN) 500 MG tablet 1 tablet, Oral, 3 times daily    ondansetron (ZOFRAN) 4 mg, Oral, Every 12 Hours PRN    Ozempic, 0.25 or 0.5 MG/DOSE, 2 MG/3ML solution pen-injector INJECT 0.25 MG  SUBCUTANEOUSLY ONCE A WEEK    sertraline (ZOLOFT) 100 mg, Oral, Daily    sodium-potassium-magnesium sulfates (Suprep Bowel Prep Kit) 17.5-3.13-1.6 GM/177ML solution oral solution Dispense one Kit        Sig: Used as Directed         Assessment & Plan   Diagnoses and all orders for this visit:    1. Colovesical fistula (Primary)    He has been scheduled for a sigmoid resection and bladder repair in conjunction with urology.             This document has been electronically signed by Boogie  ASHTYN Hays MD   July 25, 2024 13:29 EDT

## 2024-07-26 DIAGNOSIS — N32.1 COLOVESICAL FISTULA: Primary | ICD-10-CM

## 2024-07-26 RX ORDER — POLYETHYLENE GLYCOL 3350, SODIUM SULFATE ANHYDROUS, SODIUM BICARBONATE, SODIUM CHLORIDE, POTASSIUM CHLORIDE 236; 22.74; 6.74; 5.86; 2.97 G/4L; G/4L; G/4L; G/4L; G/4L
4 POWDER, FOR SOLUTION ORAL ONCE
Qty: 4000 ML | Refills: 0 | Status: SHIPPED | OUTPATIENT
Start: 2024-07-26 | End: 2024-07-26

## 2024-07-30 ENCOUNTER — TELEPHONE (OUTPATIENT)
Dept: SURGERY | Facility: CLINIC | Age: 59
End: 2024-07-30
Payer: COMMERCIAL

## 2024-07-30 NOTE — TELEPHONE ENCOUNTER
PATIENT IS AWARE OF PAT AND SURGERY INFORMATION. HE IS ALSO AWARE OF HIS PREP AND ANTIBIOTICS FOR THE SURGERY

## 2024-07-30 NOTE — DISCHARGE INSTRUCTIONS
Please discontinue all blood thinners and anticoagulants (except aspirin) prior to surgery as per your surgeon and cardiologist instructions.  Aspirin may be continued up to the day prior to surgery.    HOLD all diabetic medications the morning of surgery as order by physician.    Please follow instructions ON CHLORHEXIDINE (2 BOTTLES GIVEN WITH INSTRUCTIONS TO USE PRIOR TO SURGRY)    Arrival time for surgery on 8/2/24  will be given to you by Dr. Hays's Office.    A RESPONSIBLE PERSON MUST REMAIN IN THE WAITING ROOM DURING YOUR PROCEDURE AND A RESPONSIBLE  MUST BE AVAILABLE UPON YOUR DISCHARGE.    General Instructions:  Do NOT eat or drink after midnight 8/1/24 which includes water, mints, or gum.  You may brush your teeth. Dental appliances that are removable must be taken out day of surgery.  Do NOT smoke, chew tobacco, or drink alcohol within 24 hours prior to surgery.  Bring medications in original bottles, any inhalers and if applicable your C-PAP/BI-PAP machine  Bring any papers given to you in the doctor’s office  Wear clean, comfortable clothes and socks  Do NOT wear contact lenses or make-up or dark nail polish.  Bring a case for your glasses if applicable.  Bring crutches or walker if applicable  Leave all other valuables and jewelry at home  If you were given a blood bank armband, continue to wear it until discharged.    Preventing a Surgical Site Infection:  Shower the night before surgery (unless instructed otherwise) using a fresh bar of anti-bacterial soap (such as Dial) and clean washcloth.  Dry with a clean towel and dress in clean clothing.  For 2 to 3 days before surgery, avoid shaving with a razor near where you will have surgery because the razor can irritate skin and make it easier to develop an infection.  Ask your surgeon if you will be receiving antibiotics prior to surgery.  Make sure you, your family, and all healthcare providers clean their hands with soap and water or an  alcohol-based hand  before caring for you or your wound.  If at all possible, quit smoking as many days before surgery as you can.    Day of Surgery:  Upon arrival, a pre-op nurse and anesthesiologist will review your health history, obtain vital signs, and answer questions you may have.  The only belongings needed at this time will be your home medications and if applicable you C-PAP/BI-PAP machine.  If you are staying overnight, your family can leave the rest of your belongings in the car and bring them to your room later.  A pre-op nurse will start an IV and you may receive medication in preparation for surgery.  Due to patient privacy and limited space, only one member of your family will be able to accompany you in the pre-op area.  While you are in surgery your family should notify the waiting room  if they leave the waiting room area and provide a contact number.  Please be aware that surgery does come with discomfort.  We want to make every effort to control your discomfort so please discuss any uncontrolled symptoms with your nurse.  Your doctor will most likely have prescribed pain medications.  If you are going home after surgery you will receive individualized written care instructions before being discharged.  A responsible adult must drive you to and from the hospital on the day of surgery and stay with you for 24 hours.  If you are staying overnight following surgery, you will be transported to your hospital room following the recovery period.

## 2024-07-31 ENCOUNTER — PRE-ADMISSION TESTING (OUTPATIENT)
Dept: PREADMISSION TESTING | Facility: HOSPITAL | Age: 59
DRG: 660 | End: 2024-07-31
Payer: COMMERCIAL

## 2024-07-31 LAB
ANION GAP SERPL CALCULATED.3IONS-SCNC: 17.1 MMOL/L (ref 5–15)
BUN SERPL-MCNC: 11 MG/DL (ref 6–20)
BUN/CREAT SERPL: 13.3 (ref 7–25)
CALCIUM SPEC-SCNC: 9.8 MG/DL (ref 8.6–10.5)
CHLORIDE SERPL-SCNC: 104 MMOL/L (ref 98–107)
CO2 SERPL-SCNC: 20.9 MMOL/L (ref 22–29)
CREAT SERPL-MCNC: 0.83 MG/DL (ref 0.76–1.27)
DEPRECATED RDW RBC AUTO: 55.2 FL (ref 37–54)
EGFRCR SERPLBLD CKD-EPI 2021: 102.7 ML/MIN/1.73
ERYTHROCYTE [DISTWIDTH] IN BLOOD BY AUTOMATED COUNT: 15.3 % (ref 12.3–15.4)
GLUCOSE SERPL-MCNC: 113 MG/DL (ref 65–99)
HCT VFR BLD AUTO: 47.2 % (ref 37.5–51)
HGB BLD-MCNC: 15.7 G/DL (ref 13–17.7)
MCH RBC QN AUTO: 32.4 PG (ref 26.6–33)
MCHC RBC AUTO-ENTMCNC: 33.3 G/DL (ref 31.5–35.7)
MCV RBC AUTO: 97.3 FL (ref 79–97)
PLATELET # BLD AUTO: 304 10*3/MM3 (ref 140–450)
PMV BLD AUTO: 10.1 FL (ref 6–12)
POTASSIUM SERPL-SCNC: 3.9 MMOL/L (ref 3.5–5.2)
QT INTERVAL: 402 MS
QTC INTERVAL: 448 MS
RBC # BLD AUTO: 4.85 10*6/MM3 (ref 4.14–5.8)
SODIUM SERPL-SCNC: 142 MMOL/L (ref 136–145)
WBC NRBC COR # BLD AUTO: 10.97 10*3/MM3 (ref 3.4–10.8)

## 2024-07-31 PROCEDURE — 36415 COLL VENOUS BLD VENIPUNCTURE: CPT

## 2024-07-31 PROCEDURE — 85027 COMPLETE CBC AUTOMATED: CPT

## 2024-07-31 PROCEDURE — 80048 BASIC METABOLIC PNL TOTAL CA: CPT

## 2024-07-31 PROCEDURE — 93005 ELECTROCARDIOGRAM TRACING: CPT

## 2024-07-31 PROCEDURE — 93010 ELECTROCARDIOGRAM REPORT: CPT | Performed by: INTERNAL MEDICINE

## 2024-07-31 RX ORDER — ACETAMINOPHEN 500 MG
500 TABLET ORAL EVERY 6 HOURS PRN
Status: ON HOLD | COMMUNITY

## 2024-07-31 RX ORDER — ASPIRIN 81 MG/1
81 TABLET ORAL DAILY
Status: ON HOLD | COMMUNITY

## 2024-07-31 RX ORDER — NEOMYCIN SULFATE 500 MG/1
1000 TABLET ORAL 3 TIMES DAILY
Status: ON HOLD | COMMUNITY
End: 2024-08-02

## 2024-07-31 RX ORDER — NITROGLYCERIN 0.4 MG/1
0.4 TABLET SUBLINGUAL
Status: ON HOLD | COMMUNITY

## 2024-07-31 RX ORDER — ALBUTEROL SULFATE 90 UG/1
1 AEROSOL, METERED RESPIRATORY (INHALATION) EVERY 6 HOURS PRN
Status: ON HOLD | COMMUNITY

## 2024-07-31 NOTE — PAT
EKG AND CARDIAC HISTORY REV' WITH DR FRYE..  OK TO PROCEED   What Type Of Note Output Would You Prefer (Optional)?: Standard Output Hpi Title: Evaluation of Skin Lesions How Severe Are Your Spot(S)?: mild Have Your Spot(S) Been Treated In The Past?: has not been treated

## 2024-08-02 ENCOUNTER — HOSPITAL ENCOUNTER (INPATIENT)
Facility: HOSPITAL | Age: 59
LOS: 7 days | Discharge: HOME HEALTH | DRG: 660 | End: 2024-08-09
Attending: SURGERY | Admitting: SURGERY
Payer: COMMERCIAL

## 2024-08-02 ENCOUNTER — ANESTHESIA EVENT (OUTPATIENT)
Dept: PERIOP | Facility: HOSPITAL | Age: 59
End: 2024-08-02
Payer: COMMERCIAL

## 2024-08-02 ENCOUNTER — APPOINTMENT (OUTPATIENT)
Dept: GENERAL RADIOLOGY | Facility: HOSPITAL | Age: 59
DRG: 660 | End: 2024-08-02
Payer: COMMERCIAL

## 2024-08-02 ENCOUNTER — ANESTHESIA (OUTPATIENT)
Dept: PERIOP | Facility: HOSPITAL | Age: 59
End: 2024-08-02
Payer: COMMERCIAL

## 2024-08-02 DIAGNOSIS — Z90.49 STATUS POST COLON RESECTION: Primary | ICD-10-CM

## 2024-08-02 DIAGNOSIS — N32.1 COLOVESICAL FISTULA: ICD-10-CM

## 2024-08-02 LAB
GLUCOSE BLDC GLUCOMTR-MCNC: 138 MG/DL (ref 70–130)
GLUCOSE BLDC GLUCOMTR-MCNC: 165 MG/DL (ref 70–130)

## 2024-08-02 PROCEDURE — 25010000002 HYDROMORPHONE PER 4 MG: Performed by: NURSE ANESTHETIST, CERTIFIED REGISTERED

## 2024-08-02 PROCEDURE — 25010000002 MIDAZOLAM PER 1 MG: Performed by: NURSE ANESTHETIST, CERTIFIED REGISTERED

## 2024-08-02 PROCEDURE — 25010000002 FENTANYL CITRATE (PF) 50 MCG/ML SOLUTION: Performed by: NURSE ANESTHETIST, CERTIFIED REGISTERED

## 2024-08-02 PROCEDURE — 25010000002 DEXAMETHASONE PER 1 MG: Performed by: NURSE ANESTHETIST, CERTIFIED REGISTERED

## 2024-08-02 PROCEDURE — 25010000002 PROPOFOL 200 MG/20ML EMULSION: Performed by: NURSE ANESTHETIST, CERTIFIED REGISTERED

## 2024-08-02 PROCEDURE — 25010000002 HYDROMORPHONE PER 4 MG: Performed by: SURGERY

## 2024-08-02 PROCEDURE — C9088 BUPIVACAINE-MELOXICAM ER 200-6 MG/7ML SOLUTION: HCPCS | Performed by: SURGERY

## 2024-08-02 PROCEDURE — 25010000002 KETOROLAC TROMETHAMINE PER 15 MG: Performed by: SURGERY

## 2024-08-02 PROCEDURE — 0T788DZ DILATION OF BILATERAL URETERS WITH INTRALUMINAL DEVICE, VIA NATURAL OR ARTIFICIAL OPENING ENDOSCOPIC: ICD-10-PCS | Performed by: UROLOGY

## 2024-08-02 PROCEDURE — 76000 FLUOROSCOPY <1 HR PHYS/QHP: CPT | Performed by: RADIOLOGY

## 2024-08-02 PROCEDURE — 25810000003 LACTATED RINGERS PER 1000 ML: Performed by: SURGERY

## 2024-08-02 PROCEDURE — 76000 FLUOROSCOPY <1 HR PHYS/QHP: CPT

## 2024-08-02 PROCEDURE — 94761 N-INVAS EAR/PLS OXIMETRY MLT: CPT

## 2024-08-02 PROCEDURE — 44146 PARTIAL REMOVAL OF COLON: CPT | Performed by: SURGERY

## 2024-08-02 PROCEDURE — 25010000002 SUCCINYLCHOLINE PER 20 MG: Performed by: NURSE ANESTHETIST, CERTIFIED REGISTERED

## 2024-08-02 PROCEDURE — C2617 STENT, NON-COR, TEM W/O DEL: HCPCS | Performed by: SURGERY

## 2024-08-02 PROCEDURE — 25010000002 ONDANSETRON PER 1 MG: Performed by: NURSE ANESTHETIST, CERTIFIED REGISTERED

## 2024-08-02 PROCEDURE — 0DTJ0ZZ RESECTION OF APPENDIX, OPEN APPROACH: ICD-10-PCS | Performed by: SURGERY

## 2024-08-02 PROCEDURE — 25010000002 CEFOXITIN PER 1 G: Performed by: SURGERY

## 2024-08-02 PROCEDURE — 82948 REAGENT STRIP/BLOOD GLUCOSE: CPT

## 2024-08-02 PROCEDURE — 94799 UNLISTED PULMONARY SVC/PX: CPT

## 2024-08-02 PROCEDURE — 25010000002 HYDRALAZINE PER 20 MG: Performed by: NURSE ANESTHETIST, CERTIFIED REGISTERED

## 2024-08-02 PROCEDURE — 25010000002 BUPIVACAINE-MELOXICAM ER 200-6 MG/7ML SOLUTION: Performed by: SURGERY

## 2024-08-02 PROCEDURE — 0DBN0ZZ EXCISION OF SIGMOID COLON, OPEN APPROACH: ICD-10-PCS | Performed by: SURGERY

## 2024-08-02 PROCEDURE — 25010000002 GLYCOPYRROLATE 0.4 MG/2ML SOLUTION: Performed by: NURSE ANESTHETIST, CERTIFIED REGISTERED

## 2024-08-02 PROCEDURE — 0D1N0Z4 BYPASS SIGMOID COLON TO CUTANEOUS, OPEN APPROACH: ICD-10-PCS | Performed by: SURGERY

## 2024-08-02 PROCEDURE — 94640 AIRWAY INHALATION TREATMENT: CPT

## 2024-08-02 PROCEDURE — 25810000003 LACTATED RINGERS PER 1000 ML: Performed by: ANESTHESIOLOGY

## 2024-08-02 PROCEDURE — 25010000002 NEOSTIGMINE 10 MG/10ML SOLUTION: Performed by: NURSE ANESTHETIST, CERTIFIED REGISTERED

## 2024-08-02 DEVICE — URETERAL STENT
Type: IMPLANTABLE DEVICE | Site: URETER | Status: FUNCTIONAL
Brand: POLARIS™ ULTRA

## 2024-08-02 DEVICE — PROXIMATE RELOADABLE LINEAR CUTTER WITH SAFETY LOCK-OUT.  55MM LINEAR CUTTER.
Type: IMPLANTABLE DEVICE | Site: ABDOMEN | Status: FUNCTIONAL
Brand: PROXIMATE

## 2024-08-02 DEVICE — ECHELON CONTOUR W/ GREEN RELOAD
Type: IMPLANTABLE DEVICE | Site: ABDOMEN | Status: FUNCTIONAL
Brand: ECHELON

## 2024-08-02 DEVICE — ECHELON CIRCULAR POWERED STAPLER
Type: IMPLANTABLE DEVICE | Site: ABDOMEN | Status: FUNCTIONAL
Brand: ECHELON CIRCULAR

## 2024-08-02 RX ORDER — SODIUM CHLORIDE 0.9 % (FLUSH) 0.9 %
10 SYRINGE (ML) INJECTION EVERY 12 HOURS SCHEDULED
Status: DISCONTINUED | OUTPATIENT
Start: 2024-08-02 | End: 2024-08-02 | Stop reason: HOSPADM

## 2024-08-02 RX ORDER — MEPERIDINE HYDROCHLORIDE 25 MG/ML
12.5 INJECTION INTRAMUSCULAR; INTRAVENOUS; SUBCUTANEOUS
Status: DISCONTINUED | OUTPATIENT
Start: 2024-08-02 | End: 2024-08-02 | Stop reason: HOSPADM

## 2024-08-02 RX ORDER — SODIUM CHLORIDE 0.9 % (FLUSH) 0.9 %
10 SYRINGE (ML) INJECTION AS NEEDED
Status: DISCONTINUED | OUTPATIENT
Start: 2024-08-02 | End: 2024-08-02 | Stop reason: HOSPADM

## 2024-08-02 RX ORDER — VECURONIUM BROMIDE 1 MG/ML
INJECTION, POWDER, LYOPHILIZED, FOR SOLUTION INTRAVENOUS AS NEEDED
Status: DISCONTINUED | OUTPATIENT
Start: 2024-08-02 | End: 2024-08-02 | Stop reason: SURG

## 2024-08-02 RX ORDER — BISACODYL 5 MG/1
5 TABLET, DELAYED RELEASE ORAL DAILY PRN
Status: DISCONTINUED | OUTPATIENT
Start: 2024-08-02 | End: 2024-08-09 | Stop reason: HOSPADM

## 2024-08-02 RX ORDER — GLYCOPYRROLATE 0.2 MG/ML
INJECTION INTRAMUSCULAR; INTRAVENOUS AS NEEDED
Status: DISCONTINUED | OUTPATIENT
Start: 2024-08-02 | End: 2024-08-02 | Stop reason: SURG

## 2024-08-02 RX ORDER — FAMOTIDINE 10 MG/ML
INJECTION, SOLUTION INTRAVENOUS AS NEEDED
Status: DISCONTINUED | OUTPATIENT
Start: 2024-08-02 | End: 2024-08-02 | Stop reason: SURG

## 2024-08-02 RX ORDER — LIDOCAINE HYDROCHLORIDE 20 MG/ML
INJECTION, SOLUTION EPIDURAL; INFILTRATION; INTRACAUDAL; PERINEURAL AS NEEDED
Status: DISCONTINUED | OUTPATIENT
Start: 2024-08-02 | End: 2024-08-02 | Stop reason: SURG

## 2024-08-02 RX ORDER — ACETAMINOPHEN 500 MG
500 TABLET ORAL EVERY 6 HOURS PRN
Status: DISCONTINUED | OUTPATIENT
Start: 2024-08-02 | End: 2024-08-03

## 2024-08-02 RX ORDER — MIDAZOLAM HYDROCHLORIDE 1 MG/ML
1 INJECTION INTRAMUSCULAR; INTRAVENOUS
Status: DISCONTINUED | OUTPATIENT
Start: 2024-08-02 | End: 2024-08-02 | Stop reason: HOSPADM

## 2024-08-02 RX ORDER — MIDAZOLAM HYDROCHLORIDE 1 MG/ML
INJECTION INTRAMUSCULAR; INTRAVENOUS AS NEEDED
Status: DISCONTINUED | OUTPATIENT
Start: 2024-08-02 | End: 2024-08-02 | Stop reason: SURG

## 2024-08-02 RX ORDER — NEOSTIGMINE METHYLSULFATE 1 MG/ML
INJECTION INTRAVENOUS AS NEEDED
Status: DISCONTINUED | OUTPATIENT
Start: 2024-08-02 | End: 2024-08-02 | Stop reason: SURG

## 2024-08-02 RX ORDER — SODIUM CHLORIDE 9 MG/ML
40 INJECTION, SOLUTION INTRAVENOUS AS NEEDED
Status: DISCONTINUED | OUTPATIENT
Start: 2024-08-02 | End: 2024-08-02 | Stop reason: HOSPADM

## 2024-08-02 RX ORDER — ONDANSETRON 2 MG/ML
4 INJECTION INTRAMUSCULAR; INTRAVENOUS EVERY 6 HOURS PRN
Status: DISCONTINUED | OUTPATIENT
Start: 2024-08-02 | End: 2024-08-09 | Stop reason: HOSPADM

## 2024-08-02 RX ORDER — ALBUTEROL SULFATE 90 UG/1
1 AEROSOL, METERED RESPIRATORY (INHALATION) EVERY 6 HOURS PRN
Status: DISCONTINUED | OUTPATIENT
Start: 2024-08-02 | End: 2024-08-02

## 2024-08-02 RX ORDER — ONDANSETRON 2 MG/ML
4 INJECTION INTRAMUSCULAR; INTRAVENOUS AS NEEDED
Status: DISCONTINUED | OUTPATIENT
Start: 2024-08-02 | End: 2024-08-02 | Stop reason: HOSPADM

## 2024-08-02 RX ORDER — SUCCINYLCHOLINE CHLORIDE 20 MG/ML
INJECTION INTRAMUSCULAR; INTRAVENOUS AS NEEDED
Status: DISCONTINUED | OUTPATIENT
Start: 2024-08-02 | End: 2024-08-02 | Stop reason: SURG

## 2024-08-02 RX ORDER — SODIUM CHLORIDE 0.9 % (FLUSH) 0.9 %
10 SYRINGE (ML) INJECTION EVERY 12 HOURS SCHEDULED
Status: DISCONTINUED | OUTPATIENT
Start: 2024-08-02 | End: 2024-08-09 | Stop reason: HOSPADM

## 2024-08-02 RX ORDER — MAGNESIUM HYDROXIDE 1200 MG/15ML
LIQUID ORAL AS NEEDED
Status: DISCONTINUED | OUTPATIENT
Start: 2024-08-02 | End: 2024-08-02 | Stop reason: HOSPADM

## 2024-08-02 RX ORDER — OXYCODONE AND ACETAMINOPHEN 5; 325 MG/1; MG/1
1 TABLET ORAL ONCE AS NEEDED
Status: DISCONTINUED | OUTPATIENT
Start: 2024-08-02 | End: 2024-08-02 | Stop reason: HOSPADM

## 2024-08-02 RX ORDER — HYDRALAZINE HYDROCHLORIDE 20 MG/ML
INJECTION INTRAMUSCULAR; INTRAVENOUS AS NEEDED
Status: DISCONTINUED | OUTPATIENT
Start: 2024-08-02 | End: 2024-08-02 | Stop reason: SURG

## 2024-08-02 RX ORDER — PROPOFOL 10 MG/ML
INJECTION, EMULSION INTRAVENOUS AS NEEDED
Status: DISCONTINUED | OUTPATIENT
Start: 2024-08-02 | End: 2024-08-02 | Stop reason: SURG

## 2024-08-02 RX ORDER — FENTANYL CITRATE 50 UG/ML
INJECTION, SOLUTION INTRAMUSCULAR; INTRAVENOUS AS NEEDED
Status: DISCONTINUED | OUTPATIENT
Start: 2024-08-02 | End: 2024-08-02 | Stop reason: SURG

## 2024-08-02 RX ORDER — AMLODIPINE BESYLATE 10 MG/1
10 TABLET ORAL DAILY
Status: DISCONTINUED | OUTPATIENT
Start: 2024-08-03 | End: 2024-08-09 | Stop reason: HOSPADM

## 2024-08-02 RX ORDER — SODIUM CHLORIDE, SODIUM LACTATE, POTASSIUM CHLORIDE, CALCIUM CHLORIDE 600; 310; 30; 20 MG/100ML; MG/100ML; MG/100ML; MG/100ML
100 INJECTION, SOLUTION INTRAVENOUS ONCE AS NEEDED
Status: DISCONTINUED | OUTPATIENT
Start: 2024-08-02 | End: 2024-08-02 | Stop reason: HOSPADM

## 2024-08-02 RX ORDER — DEXAMETHASONE SODIUM PHOSPHATE 4 MG/ML
INJECTION, SOLUTION INTRA-ARTICULAR; INTRALESIONAL; INTRAMUSCULAR; INTRAVENOUS; SOFT TISSUE AS NEEDED
Status: DISCONTINUED | OUTPATIENT
Start: 2024-08-02 | End: 2024-08-02 | Stop reason: SURG

## 2024-08-02 RX ORDER — IPRATROPIUM BROMIDE AND ALBUTEROL SULFATE 2.5; .5 MG/3ML; MG/3ML
3 SOLUTION RESPIRATORY (INHALATION) ONCE AS NEEDED
Status: COMPLETED | OUTPATIENT
Start: 2024-08-02 | End: 2024-08-02

## 2024-08-02 RX ORDER — SODIUM CHLORIDE, SODIUM LACTATE, POTASSIUM CHLORIDE, CALCIUM CHLORIDE 600; 310; 30; 20 MG/100ML; MG/100ML; MG/100ML; MG/100ML
50 INJECTION, SOLUTION INTRAVENOUS CONTINUOUS
Status: DISCONTINUED | OUTPATIENT
Start: 2024-08-02 | End: 2024-08-09 | Stop reason: HOSPADM

## 2024-08-02 RX ORDER — FENTANYL CITRATE 50 UG/ML
50 INJECTION, SOLUTION INTRAMUSCULAR; INTRAVENOUS
Status: DISCONTINUED | OUTPATIENT
Start: 2024-08-02 | End: 2024-08-02 | Stop reason: HOSPADM

## 2024-08-02 RX ORDER — HYDRALAZINE HYDROCHLORIDE 50 MG/1
50 TABLET, FILM COATED ORAL 3 TIMES DAILY
Status: DISCONTINUED | OUTPATIENT
Start: 2024-08-02 | End: 2024-08-09 | Stop reason: HOSPADM

## 2024-08-02 RX ORDER — SODIUM CHLORIDE, SODIUM LACTATE, POTASSIUM CHLORIDE, CALCIUM CHLORIDE 600; 310; 30; 20 MG/100ML; MG/100ML; MG/100ML; MG/100ML
125 INJECTION, SOLUTION INTRAVENOUS ONCE
Status: COMPLETED | OUTPATIENT
Start: 2024-08-02 | End: 2024-08-02

## 2024-08-02 RX ORDER — METOPROLOL TARTRATE 25 MG/1
25 TABLET, FILM COATED ORAL 2 TIMES DAILY
Status: DISCONTINUED | OUTPATIENT
Start: 2024-08-02 | End: 2024-08-09 | Stop reason: HOSPADM

## 2024-08-02 RX ORDER — HYDROMORPHONE HYDROCHLORIDE 1 MG/ML
0.5 INJECTION, SOLUTION INTRAMUSCULAR; INTRAVENOUS; SUBCUTANEOUS
Status: DISPENSED | OUTPATIENT
Start: 2024-08-02 | End: 2024-08-07

## 2024-08-02 RX ORDER — POLYETHYLENE GLYCOL 3350 17 G/17G
17 POWDER, FOR SOLUTION ORAL DAILY PRN
Status: DISCONTINUED | OUTPATIENT
Start: 2024-08-02 | End: 2024-08-09 | Stop reason: HOSPADM

## 2024-08-02 RX ORDER — SODIUM CHLORIDE 9 MG/ML
40 INJECTION, SOLUTION INTRAVENOUS AS NEEDED
Status: DISCONTINUED | OUTPATIENT
Start: 2024-08-02 | End: 2024-08-09 | Stop reason: HOSPADM

## 2024-08-02 RX ORDER — ONDANSETRON 2 MG/ML
INJECTION INTRAMUSCULAR; INTRAVENOUS AS NEEDED
Status: DISCONTINUED | OUTPATIENT
Start: 2024-08-02 | End: 2024-08-02 | Stop reason: SURG

## 2024-08-02 RX ORDER — NITROGLYCERIN 0.4 MG/1
0.4 TABLET SUBLINGUAL
Status: DISCONTINUED | OUTPATIENT
Start: 2024-08-02 | End: 2024-08-09 | Stop reason: HOSPADM

## 2024-08-02 RX ORDER — ALBUTEROL SULFATE 0.83 MG/ML
2.5 SOLUTION RESPIRATORY (INHALATION) EVERY 6 HOURS PRN
Status: DISCONTINUED | OUTPATIENT
Start: 2024-08-02 | End: 2024-08-04

## 2024-08-02 RX ORDER — AMOXICILLIN 250 MG
2 CAPSULE ORAL 2 TIMES DAILY
Status: DISCONTINUED | OUTPATIENT
Start: 2024-08-02 | End: 2024-08-09 | Stop reason: HOSPADM

## 2024-08-02 RX ORDER — KETOROLAC TROMETHAMINE 30 MG/ML
30 INJECTION, SOLUTION INTRAMUSCULAR; INTRAVENOUS EVERY 6 HOURS PRN
Status: DISCONTINUED | OUTPATIENT
Start: 2024-08-02 | End: 2024-08-03

## 2024-08-02 RX ORDER — OXYCODONE AND ACETAMINOPHEN 5; 325 MG/1; MG/1
1 TABLET ORAL EVERY 8 HOURS PRN
Status: DISCONTINUED | OUTPATIENT
Start: 2024-08-02 | End: 2024-08-03

## 2024-08-02 RX ORDER — SODIUM CHLORIDE 0.9 % (FLUSH) 0.9 %
10 SYRINGE (ML) INJECTION AS NEEDED
Status: DISCONTINUED | OUTPATIENT
Start: 2024-08-02 | End: 2024-08-09 | Stop reason: HOSPADM

## 2024-08-02 RX ORDER — HYDROMORPHONE HYDROCHLORIDE 2 MG/ML
INJECTION, SOLUTION INTRAMUSCULAR; INTRAVENOUS; SUBCUTANEOUS AS NEEDED
Status: DISCONTINUED | OUTPATIENT
Start: 2024-08-02 | End: 2024-08-02 | Stop reason: SURG

## 2024-08-02 RX ORDER — BISACODYL 10 MG
10 SUPPOSITORY, RECTAL RECTAL DAILY PRN
Status: DISCONTINUED | OUTPATIENT
Start: 2024-08-02 | End: 2024-08-03

## 2024-08-02 RX ADMIN — SENNOSIDES AND DOCUSATE SODIUM 2 TABLET: 50; 8.6 TABLET ORAL at 20:15

## 2024-08-02 RX ADMIN — HYDRALAZINE HYDROCHLORIDE 50 MG: 50 TABLET ORAL at 17:30

## 2024-08-02 RX ADMIN — FENTANYL CITRATE 100 MCG: 50 INJECTION INTRAMUSCULAR; INTRAVENOUS at 12:08

## 2024-08-02 RX ADMIN — IPRATROPIUM BROMIDE AND ALBUTEROL SULFATE 3 ML: .5; 2.5 SOLUTION RESPIRATORY (INHALATION) at 15:18

## 2024-08-02 RX ADMIN — SODIUM CHLORIDE, POTASSIUM CHLORIDE, SODIUM LACTATE AND CALCIUM CHLORIDE: 600; 310; 30; 20 INJECTION, SOLUTION INTRAVENOUS at 13:37

## 2024-08-02 RX ADMIN — HYDROMORPHONE HYDROCHLORIDE 1 MG: 2 INJECTION, SOLUTION INTRAMUSCULAR; INTRAVENOUS; SUBCUTANEOUS at 13:30

## 2024-08-02 RX ADMIN — KETOROLAC TROMETHAMINE 30 MG: 30 INJECTION, SOLUTION INTRAMUSCULAR; INTRAVENOUS at 18:39

## 2024-08-02 RX ADMIN — VECURONIUM BROMIDE 2 MG: 1 INJECTION, POWDER, LYOPHILIZED, FOR SOLUTION INTRAVENOUS at 14:19

## 2024-08-02 RX ADMIN — HYDRALAZINE HYDROCHLORIDE 5 MG: 20 INJECTION INTRAMUSCULAR; INTRAVENOUS at 13:08

## 2024-08-02 RX ADMIN — DEXAMETHASONE SODIUM PHOSPHATE 4 MG: 4 INJECTION, SOLUTION INTRA-ARTICULAR; INTRALESIONAL; INTRAMUSCULAR; INTRAVENOUS; SOFT TISSUE at 12:10

## 2024-08-02 RX ADMIN — GLYCOPYRROLATE 0.6 MG: 0.4 INJECTION INTRAMUSCULAR; INTRAVENOUS at 14:43

## 2024-08-02 RX ADMIN — MIDAZOLAM HYDROCHLORIDE 2 MG: 1 INJECTION, SOLUTION INTRAMUSCULAR; INTRAVENOUS at 12:04

## 2024-08-02 RX ADMIN — SODIUM CHLORIDE, POTASSIUM CHLORIDE, SODIUM LACTATE AND CALCIUM CHLORIDE: 600; 310; 30; 20 INJECTION, SOLUTION INTRAVENOUS at 12:02

## 2024-08-02 RX ADMIN — ACETAMINOPHEN 500 MG: 500 TABLET ORAL at 22:35

## 2024-08-02 RX ADMIN — SODIUM CHLORIDE, POTASSIUM CHLORIDE, SODIUM LACTATE AND CALCIUM CHLORIDE 100 ML/HR: 600; 310; 30; 20 INJECTION, SOLUTION INTRAVENOUS at 17:30

## 2024-08-02 RX ADMIN — HYDROMORPHONE HYDROCHLORIDE 1 MG: 2 INJECTION, SOLUTION INTRAMUSCULAR; INTRAVENOUS; SUBCUTANEOUS at 14:19

## 2024-08-02 RX ADMIN — CEFOXITIN 2000 MG: 2 INJECTION, POWDER, FOR SOLUTION INTRAVENOUS at 14:04

## 2024-08-02 RX ADMIN — FENTANYL CITRATE 100 MCG: 50 INJECTION INTRAMUSCULAR; INTRAVENOUS at 12:58

## 2024-08-02 RX ADMIN — FAMOTIDINE 20 MG: 10 INJECTION, SOLUTION INTRAVENOUS at 12:06

## 2024-08-02 RX ADMIN — SUCCINYLCHOLINE CHLORIDE 140 MG: 20 INJECTION, SOLUTION INTRAMUSCULAR; INTRAVENOUS at 12:08

## 2024-08-02 RX ADMIN — HYDRALAZINE HYDROCHLORIDE 50 MG: 50 TABLET ORAL at 20:15

## 2024-08-02 RX ADMIN — VECURONIUM BROMIDE 5 MG: 1 INJECTION, POWDER, LYOPHILIZED, FOR SOLUTION INTRAVENOUS at 12:16

## 2024-08-02 RX ADMIN — Medication 10 ML: at 20:16

## 2024-08-02 RX ADMIN — NEOSTIGMINE METHYLSULFATE 3 MG: 0.5 INJECTION INTRAVENOUS at 14:43

## 2024-08-02 RX ADMIN — LIDOCAINE HYDROCHLORIDE 60 MG: 20 INJECTION, SOLUTION EPIDURAL; INFILTRATION; INTRACAUDAL; PERINEURAL at 12:08

## 2024-08-02 RX ADMIN — METOPROLOL TARTRATE 25 MG: 25 TABLET, FILM COATED ORAL at 20:16

## 2024-08-02 RX ADMIN — VECURONIUM BROMIDE 3 MG: 1 INJECTION, POWDER, LYOPHILIZED, FOR SOLUTION INTRAVENOUS at 12:50

## 2024-08-02 RX ADMIN — VECURONIUM BROMIDE 2 MG: 1 INJECTION, POWDER, LYOPHILIZED, FOR SOLUTION INTRAVENOUS at 13:29

## 2024-08-02 RX ADMIN — HYDROMORPHONE HYDROCHLORIDE 0.5 MG: 1 INJECTION, SOLUTION INTRAMUSCULAR; INTRAVENOUS; SUBCUTANEOUS at 20:15

## 2024-08-02 RX ADMIN — CEFOXITIN 2000 MG: 2 INJECTION, POWDER, FOR SOLUTION INTRAVENOUS at 12:04

## 2024-08-02 RX ADMIN — PROPOFOL 200 MG: 10 INJECTION, EMULSION INTRAVENOUS at 12:08

## 2024-08-02 RX ADMIN — ONDANSETRON 4 MG: 2 INJECTION INTRAMUSCULAR; INTRAVENOUS at 12:19

## 2024-08-02 NOTE — PLAN OF CARE
Goal Outcome Evaluation:              Outcome Evaluation: Pt resting in bed, VSS. Pt came to floor from PACU. Pt voices no concerns at this time, will continue with POC.

## 2024-08-02 NOTE — OP NOTE
COLON RESECTION SIGMOID, CYSTOSCOPY URETERAL CATHETER/STENT INSERTION, APPENDECTOMY, COLOSTOMY LOOP  Procedure Note    Celestine Arriaga  8/2/2024    Pre-op Diagnosis:   Colovesical fistula [N32.1]    Post-op Diagnosis:  same       Procedure(s):  COLON RESECTION SIGMOID  URETEROSCOPY URETERAL CATHETER/STENT INSERTION  APPENDECTOMY  COLOSTOMY LOOP    Surgeon(s):  Boogie Hays MD Steidle, Christopher P, MD    Anesthesia: General, General with Block    Staff:   Circulator: Chata Mahoney, NICOLASA; Little Bailey, NICOLASA; Janet Hankins, RN  Radiology Technologist: Heriberto Joaquin, RT  Scrub Person: Kiarra Olivia LPN; Kenneth Joseph; Marisa Campos  Assistant: Eliana Zuñiga    Estimated Blood Loss:  600 cc    Specimens:                Order Name Source Comment Collection Info Order Time   TISSUE EXAM, [facility] ([facility], [facility], [facility]) Large Intestine, Appendix  Collected By: Boogie Hays MD 8/2/2024  1:32 PM     Release to patient   Routine Release              Drains:   Open Drain Inferior;Midline (Active)       Colostomy RUQ (Active)       Urethral Catheter (Active)       Ureteral Drain/Stent Left ureter 5 Fr. (Active)   Site Assessment Clean;Intact 08/02/24 1301       Ureteral Drain/Stent Right ureter 5 Fr. (Active)   Site Assessment Clean;Intact 08/02/24 1303       [REMOVED] NG/OG Tube Orogastric 18 Fr Center mouth (Removed)       Procedure: The patient initially had ureteral stents placed by Dr Salazar. He then was placed in low stirrups and the perineum and abdomen prepped and draped. The incision was made in the lower abdomen. The subcutaneous tissue and fascia incised with cautery and the peritoneum opened. A  Merritt O retractor placed. There was omentum adherent to the sigmoid that was mobilized with cautery. Then the sigmoid had to be mobilized off the the left wall of the pelvis with cautery as well as to the posterior wall of the pelvis. The adhesions were very dense and tissue  planes were not identifiable. The sigmoid was slowly mobilized down in to the pelvis and off the bladder. I could not identify a hole in the bladder, and the left ureter was encased in such hard fibrotic tissue it was not possible to safely dissect it out. Eventually matted fibrotic loops of sigmoid were free enough to fire a STACY stapler on the proximal end of the scarring and a curved cutting stapler below the area that had been adherent to the bladder. A EEA sizer was placed in the rectum and did pass up the rectal stump. So the proximal end was trimmed off and a 29 EEA stapler anvil placed with a purse string of prolene. The stapler was inserted in the rectum and connected at the stump level. It was connected and fired. Because of the thin wall of the rectal stump, it was decided to make a loop colosotmy in the transverse colon. The abdomen was irrigated and a REBECCA drain placed in the pelvis and out the LLQ. It was sutured in with a silk suture. The transverse colon was cleaned off some with cautery and ostomy site made in the RUQ. A penrose drain was used to pull up the loop and a colostomy mir left to hold it in place. The midline fascia was closed with a running looped PDS suture. Zinrelief was placed in the fascia while closing. A penrose drain was placed in the base of the wound coming out the lower end. It was sutured in with a vicryl suture. The skin was closed with staples. The colostomy was then matured with vicryl sutures.     Findings:      This procedure was not performed to treat colon cancer through resection         Complications:     Grafts / Implants N/A    Boogie Hays MD     Date: 8/2/2024  Time: 15:02 EDT

## 2024-08-02 NOTE — ANESTHESIA PREPROCEDURE EVALUATION
Anesthesia Evaluation     Patient summary reviewed and Nursing notes reviewed   no history of anesthetic complications:   NPO Solid Status: > 8 hours  NPO Liquid Status: > 8 hours           Airway   Mallampati: II  TM distance: >3 FB  Neck ROM: full  No difficulty expected  Dental - normal exam   (+) poor dentition    Pulmonary    (+) a smoker Current, Smoked day of surgery, COPD moderate,rhonchi  Cardiovascular - normal exam  Exercise tolerance: good (4-7 METS)    NYHA Classification: II  ECG reviewed  Patient on routine beta blocker and Beta blocker given within 24 hours of surgery  Rhythm: regular  Rate: normal    (+) hypertension, past MI , CAD, cardiac stents (stents 11/2015 3 vessel) , CHF , hyperlipidemia      Neuro/Psych- negative ROS  GI/Hepatic/Renal/Endo    (+) obesity, morbid obesity, GERD, diabetes mellitus    Musculoskeletal (-) negative ROS    Abdominal  - normal exam    Abdomen: soft.  Bowel sounds: normal.   Substance History - negative use     OB/GYN negative ob/gyn ROS         Other   arthritis,     ROS/Med Hx Other:   Normal sinus rhythm  Anterior infarct , age undetermined  Abnormal ECG  No previous ECGs available  Confirmed by Jacob Anderson ([id number]) on 7/31/2024 6:54:58 PM                  Anesthesia Plan    ASA 3     general and general with block     (Possible TAP block.)  intravenous induction     Anesthetic plan, risks, benefits, and alternatives have been provided, discussed and informed consent has been obtained with: patient.  Pre-procedure education provided  Use of blood products discussed with  Consented to blood products.    Plan discussed with CRNA.      CODE STATUS:          Warm/Dry

## 2024-08-02 NOTE — ANESTHESIA POSTPROCEDURE EVALUATION
Patient: Celestine Arriaga    Procedure Summary       Date: 08/02/24 Room / Location: [facility] OR 01 /  [facility] OR    Anesthesia Start: 1202 Anesthesia Stop: 1500    Procedures:       COLON RESECTION SIGMOID (Abdomen)      URETEROSCOPY URETERAL CATHETER/STENT INSERTION (Bilateral)      APPENDECTOMY (Abdomen)      COLOSTOMY LOOP (Abdomen) Diagnosis:       Colovesical fistula      (Colovesical fistula [N32.1])    Surgeons: Boogie Hays MD Provider: Jorgito Campbell MD    Anesthesia Type: general, general with block ASA Status: 3            Anesthesia Type: general, general with block    Vitals  Vitals Value Taken Time   /76 08/02/24 1608   Temp 97.3 °F (36.3 °C) 08/02/24 1606   Pulse 70 08/02/24 1611   Resp 18 08/02/24 1606   SpO2 94 % 08/02/24 1611   Vitals shown include unfiled device data.        Post Anesthesia Care and Evaluation    Patient location during evaluation: PACU  Patient participation: complete - patient participated  Level of consciousness: awake  Pain score: 0  Pain management: satisfactory to patient    Airway patency: patent  Anesthetic complications: No anesthetic complications  PONV Status: none  Cardiovascular status: hemodynamically stable  Respiratory status: nasal cannula  Hydration status: acceptable

## 2024-08-02 NOTE — ANESTHESIA PROCEDURE NOTES
Airway  Urgency: elective    Date/Time: 8/2/2024 12:08 PM  Airway not difficult    General Information and Staff    Patient location during procedure: OR  CRNA/CAA: [place] CRNA    Indications and Patient Condition  Indications for airway management: airway protection    Preoxygenated: yes  MILS maintained throughout  Mask difficulty assessment: 0 - not attempted    Final Airway Details  Final airway type: endotracheal airway      Successful airway: ETT  Cuffed: yes   Successful intubation technique: direct laryngoscopy  Endotracheal tube insertion site: oral  Blade: James  Blade size: 3  ETT size (mm): 7.5  Cormack-Lehane Classification: grade IIa - partial view of glottis  Placement verified by: chest auscultation   Measured from: lips  ETT/EBT  to lips (cm): 23  Number of attempts at approach: 1  Assessment: lips, teeth, and gum same as pre-op and atraumatic intubation

## 2024-08-03 ENCOUNTER — APPOINTMENT (OUTPATIENT)
Dept: GENERAL RADIOLOGY | Facility: HOSPITAL | Age: 59
DRG: 660 | End: 2024-08-03
Payer: COMMERCIAL

## 2024-08-03 LAB
ANION GAP SERPL CALCULATED.3IONS-SCNC: 12.9 MMOL/L (ref 5–15)
BUN SERPL-MCNC: 18 MG/DL (ref 6–20)
BUN/CREAT SERPL: 14.3 (ref 7–25)
CALCIUM SPEC-SCNC: 8.6 MG/DL (ref 8.6–10.5)
CHLORIDE SERPL-SCNC: 104 MMOL/L (ref 98–107)
CO2 SERPL-SCNC: 22.1 MMOL/L (ref 22–29)
CREAT SERPL-MCNC: 1.26 MG/DL (ref 0.76–1.27)
CRP SERPL-MCNC: 7.03 MG/DL (ref 0–0.5)
DEPRECATED RDW RBC AUTO: 57.1 FL (ref 37–54)
EGFRCR SERPLBLD CKD-EPI 2021: 66.9 ML/MIN/1.73
ERYTHROCYTE [DISTWIDTH] IN BLOOD BY AUTOMATED COUNT: 15.4 % (ref 12.3–15.4)
GLUCOSE SERPL-MCNC: 132 MG/DL (ref 65–99)
HCT VFR BLD AUTO: 38.9 % (ref 37.5–51)
HGB BLD-MCNC: 12.9 G/DL (ref 13–17.7)
MCH RBC QN AUTO: 33.2 PG (ref 26.6–33)
MCHC RBC AUTO-ENTMCNC: 33.2 G/DL (ref 31.5–35.7)
MCV RBC AUTO: 100 FL (ref 79–97)
PLATELET # BLD AUTO: 267 10*3/MM3 (ref 140–450)
PMV BLD AUTO: 10.2 FL (ref 6–12)
POTASSIUM SERPL-SCNC: 4.5 MMOL/L (ref 3.5–5.2)
RBC # BLD AUTO: 3.89 10*6/MM3 (ref 4.14–5.8)
SODIUM SERPL-SCNC: 139 MMOL/L (ref 136–145)
WBC NRBC COR # BLD AUTO: 20.58 10*3/MM3 (ref 3.4–10.8)

## 2024-08-03 PROCEDURE — 94761 N-INVAS EAR/PLS OXIMETRY MLT: CPT

## 2024-08-03 PROCEDURE — 25010000002 HYDROMORPHONE PER 4 MG: Performed by: SURGERY

## 2024-08-03 PROCEDURE — 86140 C-REACTIVE PROTEIN: CPT | Performed by: SURGERY

## 2024-08-03 PROCEDURE — 99024 POSTOP FOLLOW-UP VISIT: CPT | Performed by: SURGERY

## 2024-08-03 PROCEDURE — 80048 BASIC METABOLIC PNL TOTAL CA: CPT | Performed by: SURGERY

## 2024-08-03 PROCEDURE — 85027 COMPLETE CBC AUTOMATED: CPT | Performed by: SURGERY

## 2024-08-03 PROCEDURE — 71045 X-RAY EXAM CHEST 1 VIEW: CPT

## 2024-08-03 PROCEDURE — 25810000003 LACTATED RINGERS PER 1000 ML: Performed by: SURGERY

## 2024-08-03 PROCEDURE — 94799 UNLISTED PULMONARY SVC/PX: CPT

## 2024-08-03 PROCEDURE — 94664 DEMO&/EVAL PT USE INHALER: CPT

## 2024-08-03 PROCEDURE — 25010000002 CEFOXITIN PER 1 G: Performed by: SURGERY

## 2024-08-03 PROCEDURE — 71045 X-RAY EXAM CHEST 1 VIEW: CPT | Performed by: RADIOLOGY

## 2024-08-03 PROCEDURE — 25010000002 HEPARIN (PORCINE) PER 1000 UNITS: Performed by: SURGERY

## 2024-08-03 PROCEDURE — 25010000002 KETOROLAC TROMETHAMINE PER 15 MG: Performed by: SURGERY

## 2024-08-03 RX ORDER — ACETAMINOPHEN 500 MG
1000 TABLET ORAL EVERY 6 HOURS
Status: DISCONTINUED | OUTPATIENT
Start: 2024-08-03 | End: 2024-08-09 | Stop reason: HOSPADM

## 2024-08-03 RX ORDER — OXYCODONE HYDROCHLORIDE 5 MG/1
5 TABLET ORAL EVERY 4 HOURS PRN
Status: DISCONTINUED | OUTPATIENT
Start: 2024-08-03 | End: 2024-08-09 | Stop reason: HOSPADM

## 2024-08-03 RX ORDER — HEPARIN SODIUM 5000 [USP'U]/ML
5000 INJECTION, SOLUTION INTRAVENOUS; SUBCUTANEOUS EVERY 8 HOURS SCHEDULED
Status: DISCONTINUED | OUTPATIENT
Start: 2024-08-03 | End: 2024-08-09 | Stop reason: HOSPADM

## 2024-08-03 RX ORDER — METHOCARBAMOL 750 MG/1
750 TABLET, FILM COATED ORAL 4 TIMES DAILY
Status: DISCONTINUED | OUTPATIENT
Start: 2024-08-03 | End: 2024-08-09 | Stop reason: HOSPADM

## 2024-08-03 RX ORDER — KETOROLAC TROMETHAMINE 30 MG/ML
15 INJECTION, SOLUTION INTRAMUSCULAR; INTRAVENOUS EVERY 6 HOURS PRN
Status: DISPENSED | OUTPATIENT
Start: 2024-08-03 | End: 2024-08-07

## 2024-08-03 RX ADMIN — METOPROLOL TARTRATE 25 MG: 25 TABLET, FILM COATED ORAL at 09:03

## 2024-08-03 RX ADMIN — AMLODIPINE BESYLATE 10 MG: 10 TABLET ORAL at 09:05

## 2024-08-03 RX ADMIN — ALBUTEROL SULFATE 2.5 MG: 2.5 SOLUTION RESPIRATORY (INHALATION) at 22:42

## 2024-08-03 RX ADMIN — ACETAMINOPHEN 1000 MG: 500 TABLET ORAL at 09:07

## 2024-08-03 RX ADMIN — METHOCARBAMOL 750 MG: 750 TABLET, FILM COATED ORAL at 11:49

## 2024-08-03 RX ADMIN — SODIUM CHLORIDE, POTASSIUM CHLORIDE, SODIUM LACTATE AND CALCIUM CHLORIDE 100 ML/HR: 600; 310; 30; 20 INJECTION, SOLUTION INTRAVENOUS at 13:23

## 2024-08-03 RX ADMIN — HYDRALAZINE HYDROCHLORIDE 50 MG: 50 TABLET ORAL at 09:04

## 2024-08-03 RX ADMIN — HYDRALAZINE HYDROCHLORIDE 50 MG: 50 TABLET ORAL at 15:11

## 2024-08-03 RX ADMIN — Medication 10 ML: at 20:56

## 2024-08-03 RX ADMIN — ACETAMINOPHEN 1000 MG: 500 TABLET ORAL at 20:55

## 2024-08-03 RX ADMIN — OXYCODONE HYDROCHLORIDE AND ACETAMINOPHEN 1 TABLET: 5; 325 TABLET ORAL at 03:33

## 2024-08-03 RX ADMIN — CEFOXITIN SODIUM 1000 MG: 1 POWDER, FOR SOLUTION INTRAVENOUS at 15:06

## 2024-08-03 RX ADMIN — ACETAMINOPHEN 1000 MG: 500 TABLET ORAL at 15:12

## 2024-08-03 RX ADMIN — METHOCARBAMOL 750 MG: 750 TABLET, FILM COATED ORAL at 17:24

## 2024-08-03 RX ADMIN — HYDROMORPHONE HYDROCHLORIDE 0.5 MG: 1 INJECTION, SOLUTION INTRAMUSCULAR; INTRAVENOUS; SUBCUTANEOUS at 09:49

## 2024-08-03 RX ADMIN — HEPARIN SODIUM 5000 UNITS: 5000 INJECTION INTRAVENOUS; SUBCUTANEOUS at 13:35

## 2024-08-03 RX ADMIN — METHOCARBAMOL 750 MG: 750 TABLET, FILM COATED ORAL at 20:56

## 2024-08-03 RX ADMIN — SERTRALINE 100 MG: 50 TABLET, FILM COATED ORAL at 09:04

## 2024-08-03 RX ADMIN — HYDRALAZINE HYDROCHLORIDE 50 MG: 50 TABLET ORAL at 20:54

## 2024-08-03 RX ADMIN — CEFOXITIN SODIUM 1000 MG: 1 POWDER, FOR SOLUTION INTRAVENOUS at 01:08

## 2024-08-03 RX ADMIN — HYDROMORPHONE HYDROCHLORIDE 0.5 MG: 1 INJECTION, SOLUTION INTRAMUSCULAR; INTRAVENOUS; SUBCUTANEOUS at 05:31

## 2024-08-03 RX ADMIN — HYDROMORPHONE HYDROCHLORIDE 0.5 MG: 1 INJECTION, SOLUTION INTRAMUSCULAR; INTRAVENOUS; SUBCUTANEOUS at 17:43

## 2024-08-03 RX ADMIN — KETOROLAC TROMETHAMINE 15 MG: 30 INJECTION, SOLUTION INTRAMUSCULAR; INTRAVENOUS at 12:14

## 2024-08-03 RX ADMIN — METOPROLOL TARTRATE 25 MG: 25 TABLET, FILM COATED ORAL at 20:56

## 2024-08-03 RX ADMIN — SENNOSIDES AND DOCUSATE SODIUM 2 TABLET: 50; 8.6 TABLET ORAL at 09:04

## 2024-08-03 RX ADMIN — Medication 10 ML: at 09:05

## 2024-08-03 RX ADMIN — HYDROMORPHONE HYDROCHLORIDE 0.5 MG: 1 INJECTION, SOLUTION INTRAMUSCULAR; INTRAVENOUS; SUBCUTANEOUS at 13:35

## 2024-08-03 RX ADMIN — SENNOSIDES AND DOCUSATE SODIUM 2 TABLET: 50; 8.6 TABLET ORAL at 20:55

## 2024-08-03 RX ADMIN — HYDROMORPHONE HYDROCHLORIDE 0.5 MG: 1 INJECTION, SOLUTION INTRAMUSCULAR; INTRAVENOUS; SUBCUTANEOUS at 01:08

## 2024-08-03 RX ADMIN — HEPARIN SODIUM 5000 UNITS: 5000 INJECTION INTRAVENOUS; SUBCUTANEOUS at 20:56

## 2024-08-03 RX ADMIN — HYDROMORPHONE HYDROCHLORIDE 0.5 MG: 1 INJECTION, SOLUTION INTRAMUSCULAR; INTRAVENOUS; SUBCUTANEOUS at 20:00

## 2024-08-03 RX ADMIN — SODIUM CHLORIDE, POTASSIUM CHLORIDE, SODIUM LACTATE AND CALCIUM CHLORIDE 100 ML/HR: 600; 310; 30; 20 INJECTION, SOLUTION INTRAVENOUS at 01:08

## 2024-08-03 NOTE — NURSING NOTE
Pt refused to walk tried multiple times stated he would in the am he was trying to rest. Will continue to try to talk to him about walking.

## 2024-08-03 NOTE — PLAN OF CARE
Goal Outcome Evaluation:  Plan of Care Reviewed With: patient        Progress: no change                     No acute changes at this time will continue to monitor and follow current plan of care

## 2024-08-03 NOTE — NURSING NOTE
Ambulated in hallway and back to chair with assistance.  Remains up in chair.  Shortness of air upon exertion.

## 2024-08-03 NOTE — PROGRESS NOTES
Postop day 1    Patient overall is doing okay.  He is tolerating clear liquids without nausea or vomiting.  Urine in Benton catheter is still dark.  REBECCA drain is sanguinous.  Had some sanguinous drainage from his midline dressing.  This was changed on rounds.  No active bleeding, drain in place.  Initially was having some issues with cough and secretions.  He is able to get 2000 mL on incentive spirometry now.  Has been ambulating.    Afebrile, vital signs stable.  On 2 to 3 L nasal cannula    No acute distress  Abdomen is soft, appropriately tender to palpation.  Some ecchymosis at the inferior aspect of his incision.  Drain is in place.  No cellulitis or purulence.  REBECCA drain is sanguinous.  Ostomy is congested and edematous, dark red.      Postop day 1 status post sigmoid colectomy with colovesical fistula takedown      Continue clear liquids  Continue Benton catheter  Pain regimen adjusted  Change dressing as needed  REBECCA drain to bulb suction

## 2024-08-03 NOTE — PLAN OF CARE
Goal Outcome Evaluation:  Plan of Care Reviewed With: patient        Progress: improving  Outcome Evaluation: Resting comfortably.  Walked to the door and back to bed,  Tolerated well.  Pain controlled with PRN meds.  Continue plan of care.

## 2024-08-04 LAB
ANION GAP SERPL CALCULATED.3IONS-SCNC: 11.8 MMOL/L (ref 5–15)
BUN SERPL-MCNC: 16 MG/DL (ref 6–20)
BUN/CREAT SERPL: 14.5 (ref 7–25)
CALCIUM SPEC-SCNC: 8.4 MG/DL (ref 8.6–10.5)
CHLORIDE SERPL-SCNC: 102 MMOL/L (ref 98–107)
CO2 SERPL-SCNC: 21.2 MMOL/L (ref 22–29)
CREAT SERPL-MCNC: 1.1 MG/DL (ref 0.76–1.27)
DEPRECATED RDW RBC AUTO: 56.3 FL (ref 37–54)
EGFRCR SERPLBLD CKD-EPI 2021: 78.8 ML/MIN/1.73
ERYTHROCYTE [DISTWIDTH] IN BLOOD BY AUTOMATED COUNT: 15.3 % (ref 12.3–15.4)
GLUCOSE SERPL-MCNC: 112 MG/DL (ref 65–99)
HCT VFR BLD AUTO: 37.2 % (ref 37.5–51)
HGB BLD-MCNC: 12 G/DL (ref 13–17.7)
MAGNESIUM SERPL-MCNC: 1.8 MG/DL (ref 1.6–2.6)
MCH RBC QN AUTO: 32.4 PG (ref 26.6–33)
MCHC RBC AUTO-ENTMCNC: 32.3 G/DL (ref 31.5–35.7)
MCV RBC AUTO: 100.5 FL (ref 79–97)
PHOSPHATE SERPL-MCNC: 3 MG/DL (ref 2.5–4.5)
PLATELET # BLD AUTO: 240 10*3/MM3 (ref 140–450)
PMV BLD AUTO: 10.2 FL (ref 6–12)
POTASSIUM SERPL-SCNC: 3.9 MMOL/L (ref 3.5–5.2)
RBC # BLD AUTO: 3.7 10*6/MM3 (ref 4.14–5.8)
SODIUM SERPL-SCNC: 135 MMOL/L (ref 136–145)
WBC NRBC COR # BLD AUTO: 16.24 10*3/MM3 (ref 3.4–10.8)

## 2024-08-04 PROCEDURE — 94664 DEMO&/EVAL PT USE INHALER: CPT

## 2024-08-04 PROCEDURE — 94799 UNLISTED PULMONARY SVC/PX: CPT

## 2024-08-04 PROCEDURE — 25010000002 ONDANSETRON PER 1 MG: Performed by: SURGERY

## 2024-08-04 PROCEDURE — 94761 N-INVAS EAR/PLS OXIMETRY MLT: CPT

## 2024-08-04 PROCEDURE — 83735 ASSAY OF MAGNESIUM: CPT | Performed by: SURGERY

## 2024-08-04 PROCEDURE — 84100 ASSAY OF PHOSPHORUS: CPT | Performed by: SURGERY

## 2024-08-04 PROCEDURE — 25010000002 HYDROMORPHONE PER 4 MG: Performed by: SURGERY

## 2024-08-04 PROCEDURE — 80048 BASIC METABOLIC PNL TOTAL CA: CPT | Performed by: SURGERY

## 2024-08-04 PROCEDURE — 25810000003 LACTATED RINGERS PER 1000 ML: Performed by: SURGERY

## 2024-08-04 PROCEDURE — 25010000002 HEPARIN (PORCINE) PER 1000 UNITS: Performed by: SURGERY

## 2024-08-04 PROCEDURE — 99024 POSTOP FOLLOW-UP VISIT: CPT | Performed by: SURGERY

## 2024-08-04 PROCEDURE — 25010000002 CEFOXITIN PER 1 G: Performed by: SURGERY

## 2024-08-04 PROCEDURE — 85027 COMPLETE CBC AUTOMATED: CPT | Performed by: SURGERY

## 2024-08-04 PROCEDURE — 25010000002 KETOROLAC TROMETHAMINE PER 15 MG: Performed by: SURGERY

## 2024-08-04 RX ORDER — IPRATROPIUM BROMIDE AND ALBUTEROL SULFATE 2.5; .5 MG/3ML; MG/3ML
3 SOLUTION RESPIRATORY (INHALATION)
Status: DISCONTINUED | OUTPATIENT
Start: 2024-08-04 | End: 2024-08-09 | Stop reason: HOSPADM

## 2024-08-04 RX ORDER — ALBUTEROL SULFATE 0.83 MG/ML
2.5 SOLUTION RESPIRATORY (INHALATION) EVERY 4 HOURS PRN
Status: DISCONTINUED | OUTPATIENT
Start: 2024-08-04 | End: 2024-08-09 | Stop reason: HOSPADM

## 2024-08-04 RX ORDER — ALBUTEROL SULFATE 0.83 MG/ML
2.5 SOLUTION RESPIRATORY (INHALATION)
Status: DISCONTINUED | OUTPATIENT
Start: 2024-08-04 | End: 2024-08-04

## 2024-08-04 RX ADMIN — AMLODIPINE BESYLATE 10 MG: 10 TABLET ORAL at 09:04

## 2024-08-04 RX ADMIN — METHOCARBAMOL 750 MG: 750 TABLET, FILM COATED ORAL at 17:20

## 2024-08-04 RX ADMIN — HYDROMORPHONE HYDROCHLORIDE 0.5 MG: 1 INJECTION, SOLUTION INTRAMUSCULAR; INTRAVENOUS; SUBCUTANEOUS at 00:57

## 2024-08-04 RX ADMIN — SERTRALINE 100 MG: 50 TABLET, FILM COATED ORAL at 09:04

## 2024-08-04 RX ADMIN — HEPARIN SODIUM 5000 UNITS: 5000 INJECTION INTRAVENOUS; SUBCUTANEOUS at 14:44

## 2024-08-04 RX ADMIN — HYDRALAZINE HYDROCHLORIDE 50 MG: 50 TABLET ORAL at 09:05

## 2024-08-04 RX ADMIN — METHOCARBAMOL 750 MG: 750 TABLET, FILM COATED ORAL at 21:05

## 2024-08-04 RX ADMIN — METOPROLOL TARTRATE 25 MG: 25 TABLET, FILM COATED ORAL at 21:05

## 2024-08-04 RX ADMIN — OXYCODONE HYDROCHLORIDE 5 MG: 5 TABLET ORAL at 16:03

## 2024-08-04 RX ADMIN — ONDANSETRON 4 MG: 2 INJECTION INTRAMUSCULAR; INTRAVENOUS at 00:57

## 2024-08-04 RX ADMIN — SENNOSIDES AND DOCUSATE SODIUM 2 TABLET: 50; 8.6 TABLET ORAL at 21:06

## 2024-08-04 RX ADMIN — Medication 10 ML: at 21:06

## 2024-08-04 RX ADMIN — SENNOSIDES AND DOCUSATE SODIUM 2 TABLET: 50; 8.6 TABLET ORAL at 09:05

## 2024-08-04 RX ADMIN — ACETAMINOPHEN 1000 MG: 500 TABLET ORAL at 09:04

## 2024-08-04 RX ADMIN — IPRATROPIUM BROMIDE AND ALBUTEROL SULFATE 3 ML: .5; 3 SOLUTION RESPIRATORY (INHALATION) at 12:40

## 2024-08-04 RX ADMIN — KETOROLAC TROMETHAMINE 15 MG: 30 INJECTION, SOLUTION INTRAMUSCULAR; INTRAVENOUS at 11:58

## 2024-08-04 RX ADMIN — HEPARIN SODIUM 5000 UNITS: 5000 INJECTION INTRAVENOUS; SUBCUTANEOUS at 21:06

## 2024-08-04 RX ADMIN — OXYCODONE HYDROCHLORIDE 5 MG: 5 TABLET ORAL at 09:08

## 2024-08-04 RX ADMIN — METOPROLOL TARTRATE 25 MG: 25 TABLET, FILM COATED ORAL at 09:05

## 2024-08-04 RX ADMIN — HEPARIN SODIUM 5000 UNITS: 5000 INJECTION INTRAVENOUS; SUBCUTANEOUS at 05:19

## 2024-08-04 RX ADMIN — HYDROMORPHONE HYDROCHLORIDE 0.5 MG: 1 INJECTION, SOLUTION INTRAMUSCULAR; INTRAVENOUS; SUBCUTANEOUS at 04:15

## 2024-08-04 RX ADMIN — METHOCARBAMOL 750 MG: 750 TABLET, FILM COATED ORAL at 09:04

## 2024-08-04 RX ADMIN — CEFOXITIN SODIUM 1000 MG: 1 POWDER, FOR SOLUTION INTRAVENOUS at 14:45

## 2024-08-04 RX ADMIN — ACETAMINOPHEN 1000 MG: 500 TABLET ORAL at 21:05

## 2024-08-04 RX ADMIN — SODIUM CHLORIDE, POTASSIUM CHLORIDE, SODIUM LACTATE AND CALCIUM CHLORIDE 50 ML/HR: 600; 310; 30; 20 INJECTION, SOLUTION INTRAVENOUS at 21:12

## 2024-08-04 RX ADMIN — HYDRALAZINE HYDROCHLORIDE 50 MG: 50 TABLET ORAL at 21:05

## 2024-08-04 RX ADMIN — ACETAMINOPHEN 1000 MG: 500 TABLET ORAL at 16:03

## 2024-08-04 RX ADMIN — HYDRALAZINE HYDROCHLORIDE 50 MG: 50 TABLET ORAL at 16:03

## 2024-08-04 RX ADMIN — ALBUTEROL SULFATE 2.5 MG: 2.5 SOLUTION RESPIRATORY (INHALATION) at 07:07

## 2024-08-04 RX ADMIN — Medication 10 ML: at 09:05

## 2024-08-04 RX ADMIN — IPRATROPIUM BROMIDE AND ALBUTEROL SULFATE 3 ML: .5; 3 SOLUTION RESPIRATORY (INHALATION) at 19:29

## 2024-08-04 RX ADMIN — CEFOXITIN SODIUM 1000 MG: 1 POWDER, FOR SOLUTION INTRAVENOUS at 01:43

## 2024-08-04 RX ADMIN — METHOCARBAMOL 750 MG: 750 TABLET, FILM COATED ORAL at 11:58

## 2024-08-04 NOTE — PLAN OF CARE
Goal Outcome Evaluation:  Plan of Care Reviewed With: patient        Progress: improving  Outcome Evaluation: Pt VSS on 4L NC this shift. PRN pain medication requested and given per orders. Dressing changed per orders. Pt ambulated in room and sat up in chair. Watery stool output from ostomy. Pt expresses no further concerns at this time, will continue POC.

## 2024-08-04 NOTE — PLAN OF CARE
Goal Outcome Evaluation:  Plan of Care Reviewed With: patient        Progress: no change  Outcome Evaluation: Patient resting in bed. VSS on 2L. Pt c/o of pain and nausea, PRN medications given per MAR. Colostomy bag tolerated well with loose stool. REBECCA drain in place and drained. Midline dressing change due to saturation. No concerns or complaints at this time. Will continue with plan of care.

## 2024-08-04 NOTE — PROGRESS NOTES
"Patient Name:  Celestine Arriaga  YOB: 1965  [id number]    Surgery Progress Note    Date of visit: 8/4/2024    Subjective   Subjective: Postop day 2    Reported feculent output from ostomy with some leakage from the ostomy appliance.  He has been tolerating clear liquids without nausea or vomiting.  He complains of some soreness at his incisions.  Urine in Benton appears to be clearing.  He has been ambulating  Requiring 3 to 4 L of supplemental oxygen to maintain saturations 88 to low 90s  REBECCA drain serosanguineous.         Objective     Objective:     /72 (BP Location: Left arm, Patient Position: Sitting)   Pulse 76   Temp 98.9 °F (37.2 °C) (Oral)   Resp 20   Ht 177.8 cm (70\")   Wt 116 kg (254 lb 14.4 oz)   SpO2 90%   BMI 36.57 kg/m²     Intake/Output Summary (Last 24 hours) at 8/4/2024 1159  Last data filed at 8/4/2024 0916  Gross per 24 hour   Intake 1696.66 ml   Output 2060 ml   Net -363.34 ml       No acute distress  Abdomen is soft, appropriately tender to palpation.  Some ecchymosis at the inferior aspect of his incision.  Drain is in place.  No cellulitis or purulence.  REBECCA drain is serosanguinous.  Ostomy is congested and edematous, dark red.    Recent labs that are back at this time have been reviewed.   White blood cell count decreasing.  Hemoglobin stable         Assessment & Plan     Assessment/ Plan:    58-year-old class II obese smoker status post open sigmoid colectomy with anastomosis and diverting loop colostomy for colovesical fistula takedown 8/2    Advance to full liquids  DuoNebs added  Continue to ambulate and use incentive spirometry  IV fluids have been turned down yesterday  Continue REBECCA drain to bulb suction  Change abdominal dressing as needed for drainage  Continue Benton catheter  Continue ostomy bar        This document has been electronically signed by Steven Murray MD   August 4, 2024 11:59 EDT    [facility] Surgery        "

## 2024-08-05 LAB
ANION GAP SERPL CALCULATED.3IONS-SCNC: 9.4 MMOL/L (ref 5–15)
BUN SERPL-MCNC: 10 MG/DL (ref 6–20)
BUN/CREAT SERPL: 12.2 (ref 7–25)
CALCIUM SPEC-SCNC: 9.2 MG/DL (ref 8.6–10.5)
CHLORIDE SERPL-SCNC: 107 MMOL/L (ref 98–107)
CO2 SERPL-SCNC: 20.6 MMOL/L (ref 22–29)
CREAT SERPL-MCNC: 0.82 MG/DL (ref 0.76–1.27)
CRP SERPL-MCNC: 26.37 MG/DL (ref 0–0.5)
DEPRECATED RDW RBC AUTO: 57 FL (ref 37–54)
EGFRCR SERPLBLD CKD-EPI 2021: 103.1 ML/MIN/1.73
ERYTHROCYTE [DISTWIDTH] IN BLOOD BY AUTOMATED COUNT: 15.1 % (ref 12.3–15.4)
GLUCOSE SERPL-MCNC: 120 MG/DL (ref 65–99)
HCT VFR BLD AUTO: 36 % (ref 37.5–51)
HGB BLD-MCNC: 11.6 G/DL (ref 13–17.7)
MCH RBC QN AUTO: 32.7 PG (ref 26.6–33)
MCHC RBC AUTO-ENTMCNC: 32.2 G/DL (ref 31.5–35.7)
MCV RBC AUTO: 101.4 FL (ref 79–97)
PLATELET # BLD AUTO: 256 10*3/MM3 (ref 140–450)
PMV BLD AUTO: 10 FL (ref 6–12)
POTASSIUM SERPL-SCNC: 4 MMOL/L (ref 3.5–5.2)
RBC # BLD AUTO: 3.55 10*6/MM3 (ref 4.14–5.8)
SODIUM SERPL-SCNC: 137 MMOL/L (ref 136–145)
WBC NRBC COR # BLD AUTO: 17.32 10*3/MM3 (ref 3.4–10.8)

## 2024-08-05 PROCEDURE — 99024 POSTOP FOLLOW-UP VISIT: CPT | Performed by: SURGERY

## 2024-08-05 PROCEDURE — 94799 UNLISTED PULMONARY SVC/PX: CPT

## 2024-08-05 PROCEDURE — 94664 DEMO&/EVAL PT USE INHALER: CPT

## 2024-08-05 PROCEDURE — 80048 BASIC METABOLIC PNL TOTAL CA: CPT | Performed by: SURGERY

## 2024-08-05 PROCEDURE — 25010000002 HEPARIN (PORCINE) PER 1000 UNITS: Performed by: SURGERY

## 2024-08-05 PROCEDURE — 86140 C-REACTIVE PROTEIN: CPT | Performed by: SURGERY

## 2024-08-05 PROCEDURE — 99231 SBSQ HOSP IP/OBS SF/LOW 25: CPT

## 2024-08-05 PROCEDURE — 97162 PT EVAL MOD COMPLEX 30 MIN: CPT

## 2024-08-05 PROCEDURE — 25010000002 KETOROLAC TROMETHAMINE PER 15 MG: Performed by: SURGERY

## 2024-08-05 PROCEDURE — 97166 OT EVAL MOD COMPLEX 45 MIN: CPT

## 2024-08-05 PROCEDURE — 85027 COMPLETE CBC AUTOMATED: CPT | Performed by: SURGERY

## 2024-08-05 PROCEDURE — 94761 N-INVAS EAR/PLS OXIMETRY MLT: CPT

## 2024-08-05 PROCEDURE — 25810000003 LACTATED RINGERS PER 1000 ML: Performed by: SURGERY

## 2024-08-05 RX ADMIN — ACETAMINOPHEN 1000 MG: 500 TABLET ORAL at 21:21

## 2024-08-05 RX ADMIN — HEPARIN SODIUM 5000 UNITS: 5000 INJECTION INTRAVENOUS; SUBCUTANEOUS at 05:13

## 2024-08-05 RX ADMIN — SODIUM CHLORIDE, POTASSIUM CHLORIDE, SODIUM LACTATE AND CALCIUM CHLORIDE 50 ML/HR: 600; 310; 30; 20 INJECTION, SOLUTION INTRAVENOUS at 16:25

## 2024-08-05 RX ADMIN — AMLODIPINE BESYLATE 10 MG: 10 TABLET ORAL at 08:16

## 2024-08-05 RX ADMIN — METOPROLOL TARTRATE 25 MG: 25 TABLET, FILM COATED ORAL at 08:16

## 2024-08-05 RX ADMIN — IPRATROPIUM BROMIDE AND ALBUTEROL SULFATE 3 ML: .5; 3 SOLUTION RESPIRATORY (INHALATION) at 07:17

## 2024-08-05 RX ADMIN — IPRATROPIUM BROMIDE AND ALBUTEROL SULFATE 3 ML: .5; 3 SOLUTION RESPIRATORY (INHALATION) at 18:18

## 2024-08-05 RX ADMIN — METOPROLOL TARTRATE 25 MG: 25 TABLET, FILM COATED ORAL at 21:21

## 2024-08-05 RX ADMIN — SENNOSIDES AND DOCUSATE SODIUM 2 TABLET: 50; 8.6 TABLET ORAL at 08:16

## 2024-08-05 RX ADMIN — HYDRALAZINE HYDROCHLORIDE 50 MG: 50 TABLET ORAL at 15:07

## 2024-08-05 RX ADMIN — KETOROLAC TROMETHAMINE 15 MG: 30 INJECTION, SOLUTION INTRAMUSCULAR; INTRAVENOUS at 16:25

## 2024-08-05 RX ADMIN — HEPARIN SODIUM 5000 UNITS: 5000 INJECTION INTRAVENOUS; SUBCUTANEOUS at 21:21

## 2024-08-05 RX ADMIN — HYDRALAZINE HYDROCHLORIDE 50 MG: 50 TABLET ORAL at 08:15

## 2024-08-05 RX ADMIN — ALBUTEROL SULFATE 2.5 MG: 2.5 SOLUTION RESPIRATORY (INHALATION) at 09:39

## 2024-08-05 RX ADMIN — SENNOSIDES AND DOCUSATE SODIUM 2 TABLET: 50; 8.6 TABLET ORAL at 21:21

## 2024-08-05 RX ADMIN — METHOCARBAMOL 750 MG: 750 TABLET, FILM COATED ORAL at 08:16

## 2024-08-05 RX ADMIN — OXYCODONE HYDROCHLORIDE 5 MG: 5 TABLET ORAL at 03:13

## 2024-08-05 RX ADMIN — SERTRALINE 100 MG: 50 TABLET, FILM COATED ORAL at 08:16

## 2024-08-05 RX ADMIN — ACETAMINOPHEN 1000 MG: 500 TABLET ORAL at 09:23

## 2024-08-05 RX ADMIN — Medication 10 ML: at 08:17

## 2024-08-05 RX ADMIN — Medication 10 ML: at 21:21

## 2024-08-05 RX ADMIN — ACETAMINOPHEN 1000 MG: 500 TABLET ORAL at 16:55

## 2024-08-05 RX ADMIN — METHOCARBAMOL 750 MG: 750 TABLET, FILM COATED ORAL at 17:01

## 2024-08-05 RX ADMIN — METHOCARBAMOL 750 MG: 750 TABLET, FILM COATED ORAL at 11:38

## 2024-08-05 RX ADMIN — METHOCARBAMOL 750 MG: 750 TABLET, FILM COATED ORAL at 21:21

## 2024-08-05 RX ADMIN — ACETAMINOPHEN 1000 MG: 500 TABLET ORAL at 03:10

## 2024-08-05 RX ADMIN — IPRATROPIUM BROMIDE AND ALBUTEROL SULFATE 3 ML: .5; 3 SOLUTION RESPIRATORY (INHALATION) at 13:01

## 2024-08-05 RX ADMIN — HEPARIN SODIUM 5000 UNITS: 5000 INJECTION INTRAVENOUS; SUBCUTANEOUS at 15:07

## 2024-08-05 RX ADMIN — IPRATROPIUM BROMIDE AND ALBUTEROL SULFATE 3 ML: .5; 3 SOLUTION RESPIRATORY (INHALATION) at 01:34

## 2024-08-05 RX ADMIN — HYDRALAZINE HYDROCHLORIDE 50 MG: 50 TABLET ORAL at 21:21

## 2024-08-05 NOTE — PLAN OF CARE
Goal Outcome Evaluation:  Plan of Care Reviewed With: patient        Progress: improving                                No acute changes at this time pt is resting well. Prn in pain meds given per orders no other complaints at this time. Will continue to monitor and follow current plan of care.

## 2024-08-05 NOTE — PROGRESS NOTES
Chief Complaint: Colovesicular fistula    History of Present Illness:  Mr. Arriaga is a pleasant 58-year-old male who was admitted to the hospital 3 days ago post elective colovesicular fistula repair by Dr. Hays.  Dr. Salazar assisted on surgery and placed bilateral ureteral stents.  Indwelling Benton catheter was placed due to fistula repair.  Kidney function remained stable at this time.  White count is elevated at roughly 17,000 but H&H is stable.  As I entered the patient's room this morning he was lying in bed.  Currently receiving a breathing treatment.  He denies any complaints.  Indwelling Benton catheter has yellow urine with slight sediments noted.  He has been ambulating well.  REBECCA drain has some output.  He denies any urological complaints at this time.    Vital Signs  Temp:  [98.2 °F (36.8 °C)-99.2 °F (37.3 °C)] 98.2 °F (36.8 °C)  Heart Rate:  [64-78] 78  Resp:  [18-24] 20  BP: (119-155)/(72-89) 151/88  Body mass index is 36.57 kg/m².      Intake/Output Summary (Last 24 hours) at 8/5/2024 0745  Last data filed at 8/5/2024 0500  Gross per 24 hour   Intake 2780 ml   Output 2645 ml   Net 135 ml     Intake & Output (last 3 days)         08/02 0701  08/03 0700 08/03 0701  08/04 0700 08/04 0701  08/05 0700 08/05 0701  08/06 0700    P.O. 240 480 860     I.V. (mL/kg) 2863 (24.7) 1216.7 (10.5) 1920 (16.6)     Other 600       IV Piggyback 200       Total Intake(mL/kg) 3903 (33.6) 1696.7 (14.6) 2780 (24)     Urine (mL/kg/hr) 75 1000 (0.4) 2250 (0.8)     Drains 70 40 45     Stool   350     Total Output 145 1040 2645     Net +3758 +656.7 +135                     Physical exam:  PHYSICAL EXAM  GENERAL:  A well developed, well nourished, white male in no acute distress.  HEENT:  Pupils equally round, reactive to light.  Extraocular muscles intact.  CARDIOVASCULAR:  Regular rate and rhythm.  No murmurs, gallops or rubs.  LUNGS:  Clear to auscultation bilaterally.  ABDOMEN:  Soft, nontender, nondistended with positive  bowel sounds.  Ostomy in place with incision site healing well.  REBECCA drain has some serosanguineous output noted.  : Indwelling Benton catheter in place with yellow urine and some sediments.  Urine is improving.  SKIN:  No rashes or petechiae.  EXTREMITIES:  No clubbing, cyanosis or edema bilaterally.  NEURO:  Cranial nerves grossly intact.  No focal deficits.  PSYCH:  Alert and oriented x3.     Results Review:  Lab Results   Component Value Date    WBC 17.32 (H) 08/05/2024    HGB 11.6 (L) 08/05/2024    HCT 36.0 (L) 08/05/2024    .4 (H) 08/05/2024     08/05/2024     Lab Results   Component Value Date    GLUCOSE 120 (H) 08/05/2024    BUN 10 08/05/2024    CREATININE 0.82 08/05/2024    BCR 12.2 08/05/2024    CO2 20.6 (L) 08/05/2024    CALCIUM 9.2 08/05/2024       Imaging Results (Last 72 Hours)       Procedure Component Value Units Date/Time    XR Chest 1 View [[id number]] Collected: 08/03/24 1947     Updated: 08/03/24 1950    Narrative:      Comparison: None     Findings:  1.  No consolidation  2.  No pulmonary edema, pleural effusion or pneumothorax       Impression:      Impression:  No acute process.        This report was finalized on 8/3/2024 7:48 PM by Ibrahima Trujillo DO.       FL Surgery Fluoro [[id number]] Collected: 08/02/24 1339     Updated: 08/02/24 1342    Narrative:      EXAMINATION: FL SURGERY FLUORO-      CLINICAL INDICATION: ureatal stents        COMPARISON: None available     Total fluoroscopy time 15.8 seconds     Fluoroscopy was provided and 4 images were acquired's stent was placed     For a full procedure report, please see the report provided by the  performing physician        This report was finalized on 8/2/2024 1:40 PM by Dr. Kenneth Jones MD.                      Assessment & Plan   Colovesicular fistula -patient is roughly 2 days status post stent placement by Dr. Salazar with colovesicular repair by Dr. Hays.  He is healing well and urine is improving.  Patient may follow-up  in roughly 1 to 2 weeks post discharge for discussion of stent removals.  Thank you for allowing us to participate in Mr. Bart baugh.  Do not hesitate to call with any questions or concerns.          This document has been electronically signed by Peterson Taylor PA-C  August 5, 2024 07:45 EDT    Part of this note may be an electronic transcription/translation of spoken language to printed text using the Dragon Dictation System.

## 2024-08-05 NOTE — PROGRESS NOTES
POD#3 sigmoid resection, colostomy for colovesical fistula    He is tolerating full liquids well with no nausea or vomiting. The REBECCA just has a little serosanguinous drainage with no indication of urinary leak. The catheter is in and urine is still a little dark. Cr is ok. Afeb and vss. The wound looks ok and colostomy is putting out stool. He had a extremely scarred down pelvis and pathology is still pending. Try advancing the diet.

## 2024-08-05 NOTE — THERAPY EVALUATION
Acute Care - Occupational Therapy Initial Evaluation  [facility]     Patient Name: Celestine Arriaga  : 1965  MRN: [id number]  Today's Date: 2024  Onset of Illness/Injury or Date of Surgery: 24     Referring Physician: Dr. Murray    Admit Date: 2024       ICD-10-CM ICD-9-CM   1. Colovesical fistula  N32.1 596.1     Patient Active Problem List   Diagnosis    Frequency of micturition    Recurrent UTI (urinary tract infection)    Acute prostatitis with hematuria    Incomplete emptying of bladder    Class 2 severe obesity due to excess calories with serious comorbidity in adult    Colovesical fistula    Colovesical fistula    Status post colon resection     Past Medical History:   Diagnosis Date    Arthritis     Chest pain     CHF (congestive heart failure)     COPD (chronic obstructive pulmonary disease)     Coronary artery disease     Diabetes mellitus     Elevated cholesterol     GERD (gastroesophageal reflux disease)     [place] (hard of hearing)     Hypertension     MI (myocardial infarction)     cardiac dr told him he had had a mild attack    Neuropathy     LOWER LEGS/FEET    Urinary tract infection      Past Surgical History:   Procedure Laterality Date    APPENDECTOMY N/A 2024    Procedure: APPENDECTOMY;  Surgeon: Boogie Hays MD;  Location: [facility];  Service: General;  Laterality: N/A;    BACK SURGERY      CARDIAC CATHETERIZATION      CARDIAC SURGERY      COLON RESECTION N/A 2024    Procedure: COLON RESECTION SIGMOID;  Surgeon: Boogie Hays MD;  Location: [facility] OR;  Service: General;  Laterality: N/A;    COLONOSCOPY      COLONOSCOPY N/A 2024    Procedure: COLONOSCOPY;  Surgeon: Boogie Hays MD;  Location: [facility] OR;  Service: Gastroenterology;  Laterality: N/A;    COLOSTOMY N/A 2024    Procedure: COLOSTOMY LOOP;  Surgeon: Boogie Hays MD;  Location: [facility] OR;  Service: General;  Laterality: N/A;    CORONARY ANGIOPLASTY WITH STENT PLACEMENT      3 STENTS  TOTAL    CYSTOSCOPY W/ URETERAL STENT PLACEMENT Bilateral 8/2/2024    Procedure: URETEROSCOPY URETERAL CATHETER/STENT INSERTION;  Surgeon: Boogie Hays MD;  Location: [facility];  Service: General;  Laterality: Bilateral;    EYE SURGERY Right     TOE AMPUTATION Left     great toe in  accident         OT ASSESSMENT FLOWSHEET (Last 12 Hours)       OT Evaluation and Treatment       Row Name 08/05/24 1600                   OT Time and Intention    Document Type evaluation  -KR        Mode of Treatment occupational therapy  -KR        Patient Effort adequate  -KR           General Information    General Observations of Patient alert/cooperative  -KR           Living Environment    Current Living Arrangements home  -KR        Primary Care Provided by self  -KR           Cognition    Affect/Mental Status (Cognition) WFL  -KR        Orientation Status (Cognition) oriented x 3  -KR        Follows Commands (Cognition) WFL  -KR           Range of Motion Comprehensive    Comment, General Range of Motion BUE WFL  -KR           Strength Comprehensive (MMT)    Comment, General Manual Muscle Testing (MMT) Assessment BUE 3/5  -KR           Activities of Daily Living    BADL Assessment/Intervention bathing;upper body dressing;lower body dressing;grooming;feeding  -KR           Bathing Assessment/Intervention    [place] Level (Bathing) bathing skills;minimum assist (75% patient effort)  -KR           Upper Body Dressing Assessment/Training    [place] Level (Upper Body Dressing) upper body dressing skills;set up  -KR           Lower Body Dressing Assessment/Training    [place] Level (Lower Body Dressing) lower body dressing skills;minimum assist (75% patient effort)  -KR           Grooming Assessment/Training    [place] Level (Grooming) grooming skills;set up  -KR           Self-Feeding Assessment/Training    [place] Level (Feeding) feeding skills;set up  -KR           Wound 08/02/24 1253 Left  midline abdomen Incision    Wound - Properties Group Placement Date: 08/02/24  -KB Placement Time: 1253  -KB Present on Original Admission: N  -KB Side: Left  -KB Orientation: midline  -KB Location: abdomen  -KB Primary Wound Type: Incision  -KB    Retired Wound - Properties Group Placement Date: 08/02/24  -KB Placement Time: 1253  -KB Present on Original Admission: N  -KB Side: Left  -KB Orientation: midline  -KB Location: abdomen  -KB Primary Wound Type: Incision  -KB    Retired Wound - Properties Group Date first assessed: 08/02/24  -KB Time first assessed: 1253  -KB Present on Original Admission: N  -KB Side: Left  -KB Location: abdomen  -KB Primary Wound Type: Incision  -KB       Plan of Care Review    Plan of Care Reviewed With patient  -KR        Progress improving  -KR           OT Goals    Activity Tolerance Goal Selection (OT) activity tolerance, OT goal 1  -KR            Activity Tolerance Goal 1 (OT)    Activity Tolerance Goal 1 (OT) Increase to enhance ADL performance  -KR        Activity Level (Endurance Goal 1, OT) 20 min activity  -KR        Time Frame (Activity Tolerance Goal 1, OT) by discharge  -KR                  User Key  (r) = Recorded By, (t) = Taken By, (c) = Cosigned By      Initials Name Effective Dates    Chata Esquivel RN 05/26/22 -     Behzad Manzano OT 06/16/21 -                            OT Recommendation and Plan     Plan of Care Review  Plan of Care Reviewed With: patient  Progress: improving  Plan of Care Reviewed With: patient        Time Calculation:     Therapy Charges for Today       Code Description Service Date Service Provider Modifiers Qty    42810938693 HC OT EVAL MOD COMPLEXITY 4 8/5/2024 Behzad Serrano OT GO 1                 Behzad Serrano OT  8/5/2024

## 2024-08-05 NOTE — PLAN OF CARE
Goal Outcome Evaluation:  Plan of Care Reviewed With: patient        Progress: improving  Outcome Evaluation: Patient is in bed at this time. VSS on 4LNC. Pt ambulated in room and sat in chair this shift. Colostomy bag changed x3. Pt's O2 does drop when ambulating. Will continue POC.

## 2024-08-05 NOTE — THERAPY EVALUATION
Acute Care - Physical Therapy Initial Evaluation   [place]     Patient Name: Celestine Arriaga  : 1965  MRN: [id number]  Today's Date: 2024   Onset of Illness/Injury or Date of Surgery: 24  Visit Dx:     ICD-10-CM ICD-9-CM   1. Colovesical fistula  N32.1 596.1     Patient Active Problem List   Diagnosis    Frequency of micturition    Recurrent UTI (urinary tract infection)    Acute prostatitis with hematuria    Incomplete emptying of bladder    Class 2 severe obesity due to excess calories with serious comorbidity in adult    Colovesical fistula    Colovesical fistula    Status post colon resection     Past Medical History:   Diagnosis Date    Arthritis     Chest pain     CHF (congestive heart failure)     COPD (chronic obstructive pulmonary disease)     Coronary artery disease     Diabetes mellitus     Elevated cholesterol     GERD (gastroesophageal reflux disease)     [place] (hard of hearing)     Hypertension     MI (myocardial infarction)     cardiac dr told him he had had a mild attack    Neuropathy     LOWER LEGS/FEET    Urinary tract infection      Past Surgical History:   Procedure Laterality Date    APPENDECTOMY N/A 2024    Procedure: APPENDECTOMY;  Surgeon: Boogie Hays MD;  Location: [facility];  Service: General;  Laterality: N/A;    BACK SURGERY      CARDIAC CATHETERIZATION      CARDIAC SURGERY      COLON RESECTION N/A 2024    Procedure: COLON RESECTION SIGMOID;  Surgeon: Boogie Hays MD;  Location: [facility];  Service: General;  Laterality: N/A;    COLONOSCOPY      COLONOSCOPY N/A 2024    Procedure: COLONOSCOPY;  Surgeon: Boogie Hays MD;  Location: [facility];  Service: Gastroenterology;  Laterality: N/A;    COLOSTOMY N/A 2024    Procedure: COLOSTOMY LOOP;  Surgeon: Boogie Hays MD;  Location: [facility];  Service: General;  Laterality: N/A;    CORONARY ANGIOPLASTY WITH STENT PLACEMENT      3 STENTS TOTAL    CYSTOSCOPY W/ URETERAL STENT PLACEMENT Bilateral  8/2/2024    Procedure: URETEROSCOPY URETERAL CATHETER/STENT INSERTION;  Surgeon: Boogie Hays MD;  Location: [facility];  Service: General;  Laterality: Bilateral;    EYE SURGERY Right     TOE AMPUTATION Left     great toe in  accident     PT Assessment (Last 12 Hours)       PT Evaluation and Treatment       Row Name 08/05/24 1344          Physical Therapy Time and Intention    Subjective Information complains of;pain  -AG     Document Type evaluation  -AG     Mode of Treatment physical therapy  -AG     Patient Effort adequate  -AG     Symptoms Noted During/After Treatment increased pain;fatigue;shortness of breath;significant change in vital signs  -AG       Row Name 08/05/24 1340          General Information    Patient Profile Reviewed yes  -AG     Onset of Illness/Injury or Date of Surgery 08/02/24  -AG     Referring Physician Dr. Murray  -AG     Patient Observations agree to therapy;cooperative;alert  -AG     Patient/Family/Caregiver Comments/Observations pt. seated in bedside chair; he had removed his O2 nc and was shaving.  -AG     Prior Level of Function independent:  -AG     Equipment Currently Used at Home none  -AG     Pertinent History of Current Functional Problem pt. admitted with colovesical fistula s/p colon resection and colostomy placement  -AG     Existing Precautions/Restrictions oxygen therapy device and L/min  -AG     Risks Reviewed increased discomfort;patient:;change in vital signs  -AG     Benefits Reviewed patient:;improve function;increase independence;increase strength;increase balance;increase knowledge;decrease risk of DVT  -AG     Barriers to Rehab medically complex  -AG       Row Name 08/05/24 7282          Previous Level of Function/Home Environm    Previous Level of Function, Premorbid pt. previously home alone; indepenent with all self care and community mobility without assistive device  -AG       Row Name 08/05/24 1346          Living Environment    Current Living  Arrangements home  -     People in Home alone  -     Primary Care Provided by self  -[facility] Name 08/05/24 1344          Home Use of Assistive/Adaptive Equipment    Equipment Currently Used at Home none  -[facility] Name 08/05/24 1344          Pain    Pain Intervention(s) --  pt. reports abdominal discomfort at surgical site  -AG       Row Name 08/05/24 1344          Cognition    Affect/Mental Status (Cognition) WNL  -     Orientation Status (Cognition) oriented x 3  -AG     Follows Commands (Cognition) WFL  -     Personal Safety Interventions fall prevention program maintained;gait belt;nonskid shoes/slippers when out of bed;supervised activity  -[facility] Name 08/05/24 1344          Range of Motion (ROM)    Range of Motion ROM is WFL;bilateral lower extremities  -AG       Row Name 08/05/24 1344          Strength (Manual Muscle Testing)    Strength (Manual Muscle Testing) strength is WFL;bilateral lower extremities  -AG       Row Name 08/05/24 1344          Sensory    Hearing Status WFL  -AG       Row Name 08/05/24 1344          Sensory Assessment (Somatosensory)    Sensory Assessment (Somatosensory) LE sensation intact  -AG       Row Name 08/05/24 1344          Transfers    Transfers sit-stand transfer;stand-sit transfer;stand pivot/stand step transfer  -AG       Row Name 08/05/24 1344          Sit-Stand Transfer    Sit-Stand [place] (Transfers) standby assist  -AG     Assistive Device (Sit-Stand Transfers) --  none  -AG       Row Name 08/05/24 1344          Stand-Sit Transfer    Stand-Sit [place] (Transfers) standby assist  -     Assistive Device (Stand-Sit Transfers) --  none  -AG       Row Name 08/05/24 1344          Stand Pivot/Stand Step Transfer    Stand Pivot/Stand Step [place] (Transfers) contact guard  -AG       Row Name 08/05/24 1344          Gait/Stairs (Locomotion)    Patient was able to Ambulate no, other medical factors prevent ambulation  -     Reason Patient was  unable to Ambulate Hypoxia/Respiratory Distress  O2 desaturation with standing.  -AG       Row Name 08/05/24 1344          Safety Issues, Functional Mobility    Impairments Affecting Function (Mobility) endurance/activity tolerance;shortness of breath  -AG       Row Name 08/05/24 1344          Balance    Balance Assessment sitting static balance;sitting dynamic balance;sit to stand dynamic balance;standing static balance;standing dynamic balance  -AG     Static Sitting Balance independent  -AG     Position, Sitting Balance supported;sitting in chair  -AG     Static Standing Balance standby assist  -AG     Position/Device Used, Standing Balance --  none  -AG       Row Name             Wound 08/02/24 1253 Left midline abdomen Incision    Wound - Properties Group Placement Date: 08/02/24  -KB Placement Time: 1253  -KB Present on Original Admission: N  -KB Side: Left  -KB Orientation: midline  -KB Location: abdomen  -KB Primary Wound Type: Incision  -KB    Retired Wound - Properties Group Placement Date: 08/02/24  -KB Placement Time: 1253  -KB Present on Original Admission: N  -KB Side: Left  -KB Orientation: midline  -KB Location: abdomen  -KB Primary Wound Type: Incision  -KB    Retired Wound - Properties Group Date first assessed: 08/02/24  -KB Time first assessed: 1253  -KB Present on Original Admission: N  -KB Side: Left  -KB Location: abdomen  -KB Primary Wound Type: Incision  -KB      Row Name 08/05/24 1344          Coping    Observed Emotional State calm;cooperative  -AG     Verbalized Emotional State acceptance  -AG     Trust Relationship/Rapport care explained;choices provided;thoughts/feelings acknowledged  -AG     Family/Support Persons friend  -AG     Involvement in Care not present at bedside  -AG     Family/Support System Care support provided;self-care encouraged  -AG       Row Name 08/05/24 1344          Plan of Care Review    Plan of Care Reviewed With patient  -AG     Outcome Evaluation PT evaluation  performed.  Pt. had removed O2 nc at 5L prior to PT arrival and was on room air while he sat up in chair to shave. Pt. stood w/ SBA and became SOA, SpO2 to 85%.  PT will contine to follow and progress as tolerated.  -AG       Row Name 08/05/24 5117          Positioning and Restraints    Pre-Treatment Position sitting in chair/recliner  -AG     Post Treatment Position chair  -AG     In Chair sitting;call light within reach  -AG       Row Name 08/05/24 3412          Therapy Assessment/Plan (PT)    Functional Level at Time of Evaluation (PT) CGA  -AG     PT Diagnosis (PT) impaired functional mobility, endurance  -AG     Rehab Potential (PT) good, to achieve stated therapy goals  -AG     Criteria for Skilled Interventions Met (PT) yes;meets criteria  -AG     Therapy Frequency (PT) 2 times/wk  1-5 times/ week per priority  -AG     Predicted Duration of Therapy Intervention (PT) LOS  -AG     Problem List (PT) problems related to;balance;mobility;pain  -AG     Activity Limitations Related to Problem List (PT) BADLs not performed adequately or safely;unable to transfer safely;unable to ambulate safely  -AG       Row Name 08/05/24 0565          Therapy Plan Review/Discharge Plan (PT)    Therapy Plan Review (PT) evaluation/treatment results reviewed;care plan/treatment goals reviewed;risks/benefits reviewed;current/potential barriers reviewed;participants voiced agreement with care plan;participants included;patient  -AG       Row Name 08/05/24 6310          Physical Therapy Goals    Transfer Goal Selection (PT) transfer, PT goal 1  -AG     Gait Training Goal Selection (PT) gait training, PT goal 1  -AG       Row Name 08/05/24 5358          Transfer Goal 1 (PT)    Activity/Assistive Device (Transfer Goal 1, PT) sit-to-stand/stand-to-sit;bed-to-chair/chair-to-bed;toilet  -AG     [place] Level/Cues Needed (Transfer Goal 1, PT) modified independence  -AG     Time Frame (Transfer Goal 1, PT) by discharge  -       Row Name  08/05/24 1344          Gait Training Goal 1 (PT)    Activity/Assistive Device (Gait Training Goal 1, PT) gait (walking locomotion);decrease fall risk;diminish gait deviation;improve balance and speed;increase endurance/gait distance  appropriate a.d.  -AG     [place] Level (Gait Training Goal 1, PT) standby assist  -AG     Distance (Gait Training Goal 1, PT) 20  -AG     Time Frame (Gait Training Goal 1, PT) by discharge  -AG               User Key  (r) = Recorded By, (t) = Taken By, (c) = Cosigned By      Initials Name Provider Type    Chata Esquivel, RN Registered Nurse    Feli Shi, PT Physical Therapist                    Physical Therapy Education       Title: PT OT SLP Therapies (Done)       Topic: Physical Therapy (Done)       Point: Mobility training (Done)       Learning Progress Summary             Patient Acceptance, E,D, VU,NR by  at 8/5/2024 1343                         Point: Home exercise program (Done)       Learning Progress Summary             Patient Acceptance, E,D, VU,NR by  at 8/5/2024 1343                         Point: Body mechanics (Done)       Learning Progress Summary             Patient Acceptance, E,D, VU,NR by  at 8/5/2024 1343                         Point: Precautions (Done)       Learning Progress Summary             Patient Acceptance, E,D, VU,NR by  at 8/5/2024 1343                                         User Key       Initials Effective Dates Name Provider Type [facility] 06/16/21 -  Feli Pittman, PT Physical Therapist PT                  PT Recommendation and Plan  Anticipated Discharge Disposition (PT): home with assist  Planned Therapy Interventions (PT): balance training, bed mobility training, gait training, home exercise program, transfer training  Therapy Frequency (PT): 2 times/wk (1-5 times/ week per priority)  Plan of Care Reviewed With: patient  Outcome Evaluation: PT evaluation performed.  Pt. had removed O2 nc at 5L prior to PT  arrival and was on room air while he sat up in chair to shave. Pt. stood w/ SBA and became SOA, SpO2 to 85%.  PT will contine to follow and progress as tolerated.       Time Calculation:    PT Charges       Row Name 08/05/24 1344             Time Calculation    PT Received On 08/05/24  -      PT Goal Re-Cert Due Date 08/19/24  -                User Key  (r) = Recorded By, (t) = Taken By, (c) = Cosigned By      Initials Name Provider Type    Feli Shi, PT Physical Therapist                  Therapy Charges for Today       Code Description Service Date Service Provider Modifiers Qty    32140504048 HC PT EVAL MOD COMPLEXITY 4 8/5/2024 Feli Pittman, PT GP 1            PT G-Codes  AM-PAC 6 Clicks Score (PT): 22    Feli Pittman PT  8/5/2024

## 2024-08-06 LAB
CRP SERPL-MCNC: 19.34 MG/DL (ref 0–0.5)
DEPRECATED RDW RBC AUTO: 54.7 FL (ref 37–54)
ERYTHROCYTE [DISTWIDTH] IN BLOOD BY AUTOMATED COUNT: 15.2 % (ref 12.3–15.4)
HCT VFR BLD AUTO: 38.5 % (ref 37.5–51)
HGB BLD-MCNC: 12.5 G/DL (ref 13–17.7)
MCH RBC QN AUTO: 32.1 PG (ref 26.6–33)
MCHC RBC AUTO-ENTMCNC: 32.5 G/DL (ref 31.5–35.7)
MCV RBC AUTO: 99 FL (ref 79–97)
PLATELET # BLD AUTO: 297 10*3/MM3 (ref 140–450)
PMV BLD AUTO: 10.3 FL (ref 6–12)
RBC # BLD AUTO: 3.89 10*6/MM3 (ref 4.14–5.8)
WBC NRBC COR # BLD AUTO: 15.6 10*3/MM3 (ref 3.4–10.8)

## 2024-08-06 PROCEDURE — 25010000002 KETOROLAC TROMETHAMINE PER 15 MG: Performed by: SURGERY

## 2024-08-06 PROCEDURE — 85027 COMPLETE CBC AUTOMATED: CPT | Performed by: SURGERY

## 2024-08-06 PROCEDURE — 99024 POSTOP FOLLOW-UP VISIT: CPT | Performed by: SURGERY

## 2024-08-06 PROCEDURE — 86140 C-REACTIVE PROTEIN: CPT | Performed by: SURGERY

## 2024-08-06 PROCEDURE — 94664 DEMO&/EVAL PT USE INHALER: CPT

## 2024-08-06 PROCEDURE — 25810000003 LACTATED RINGERS PER 1000 ML: Performed by: SURGERY

## 2024-08-06 PROCEDURE — 94799 UNLISTED PULMONARY SVC/PX: CPT

## 2024-08-06 PROCEDURE — 94761 N-INVAS EAR/PLS OXIMETRY MLT: CPT

## 2024-08-06 PROCEDURE — 25010000002 HEPARIN (PORCINE) PER 1000 UNITS: Performed by: SURGERY

## 2024-08-06 PROCEDURE — 99231 SBSQ HOSP IP/OBS SF/LOW 25: CPT

## 2024-08-06 RX ADMIN — SERTRALINE 100 MG: 50 TABLET, FILM COATED ORAL at 09:32

## 2024-08-06 RX ADMIN — AMLODIPINE BESYLATE 10 MG: 10 TABLET ORAL at 09:32

## 2024-08-06 RX ADMIN — METHOCARBAMOL 750 MG: 750 TABLET, FILM COATED ORAL at 09:32

## 2024-08-06 RX ADMIN — KETOROLAC TROMETHAMINE 15 MG: 30 INJECTION, SOLUTION INTRAMUSCULAR; INTRAVENOUS at 21:17

## 2024-08-06 RX ADMIN — METHOCARBAMOL 750 MG: 750 TABLET, FILM COATED ORAL at 11:26

## 2024-08-06 RX ADMIN — SENNOSIDES AND DOCUSATE SODIUM 2 TABLET: 50; 8.6 TABLET ORAL at 09:32

## 2024-08-06 RX ADMIN — METOPROLOL TARTRATE 25 MG: 25 TABLET, FILM COATED ORAL at 21:02

## 2024-08-06 RX ADMIN — METHOCARBAMOL 750 MG: 750 TABLET, FILM COATED ORAL at 21:01

## 2024-08-06 RX ADMIN — HYDRALAZINE HYDROCHLORIDE 50 MG: 50 TABLET ORAL at 09:32

## 2024-08-06 RX ADMIN — SENNOSIDES AND DOCUSATE SODIUM 2 TABLET: 50; 8.6 TABLET ORAL at 21:01

## 2024-08-06 RX ADMIN — ACETAMINOPHEN 1000 MG: 500 TABLET ORAL at 09:32

## 2024-08-06 RX ADMIN — IPRATROPIUM BROMIDE AND ALBUTEROL SULFATE 3 ML: .5; 3 SOLUTION RESPIRATORY (INHALATION) at 06:55

## 2024-08-06 RX ADMIN — ACETAMINOPHEN 1000 MG: 500 TABLET ORAL at 17:36

## 2024-08-06 RX ADMIN — HEPARIN SODIUM 5000 UNITS: 5000 INJECTION INTRAVENOUS; SUBCUTANEOUS at 21:01

## 2024-08-06 RX ADMIN — IPRATROPIUM BROMIDE AND ALBUTEROL SULFATE 3 ML: .5; 3 SOLUTION RESPIRATORY (INHALATION) at 00:28

## 2024-08-06 RX ADMIN — METOPROLOL TARTRATE 25 MG: 25 TABLET, FILM COATED ORAL at 09:30

## 2024-08-06 RX ADMIN — HYDRALAZINE HYDROCHLORIDE 50 MG: 50 TABLET ORAL at 21:02

## 2024-08-06 RX ADMIN — Medication 10 ML: at 09:33

## 2024-08-06 RX ADMIN — ACETAMINOPHEN 1000 MG: 500 TABLET ORAL at 03:28

## 2024-08-06 RX ADMIN — OXYCODONE HYDROCHLORIDE 5 MG: 5 TABLET ORAL at 04:13

## 2024-08-06 RX ADMIN — HEPARIN SODIUM 5000 UNITS: 5000 INJECTION INTRAVENOUS; SUBCUTANEOUS at 14:37

## 2024-08-06 RX ADMIN — HYDRALAZINE HYDROCHLORIDE 50 MG: 50 TABLET ORAL at 16:36

## 2024-08-06 RX ADMIN — SODIUM CHLORIDE, POTASSIUM CHLORIDE, SODIUM LACTATE AND CALCIUM CHLORIDE 50 ML/HR: 600; 310; 30; 20 INJECTION, SOLUTION INTRAVENOUS at 09:30

## 2024-08-06 RX ADMIN — IPRATROPIUM BROMIDE AND ALBUTEROL SULFATE 3 ML: .5; 3 SOLUTION RESPIRATORY (INHALATION) at 13:53

## 2024-08-06 RX ADMIN — METHOCARBAMOL 750 MG: 750 TABLET, FILM COATED ORAL at 17:36

## 2024-08-06 RX ADMIN — HEPARIN SODIUM 5000 UNITS: 5000 INJECTION INTRAVENOUS; SUBCUTANEOUS at 05:44

## 2024-08-06 RX ADMIN — IPRATROPIUM BROMIDE AND ALBUTEROL SULFATE 3 ML: .5; 3 SOLUTION RESPIRATORY (INHALATION) at 18:38

## 2024-08-06 RX ADMIN — Medication 10 ML: at 21:02

## 2024-08-06 NOTE — CASE MANAGEMENT/SOCIAL WORK
Discharge Planning Assessment  [facility]     Patient Name: Celestine Arriaga  MRN: [id number]  Today's Date: 8/6/2024    Admit Date: 8/2/2024    Plan: CM spoke with pt at the bedside. Pt lives at home alone and plans to return at d/c. Pt does not use any DME but has RW and rollator if needed. Pt has had oxygen in the past and may need oxygen at d/c. Urology plans to d/c with F/C. Pt does not have HH but may need HH for new ostomy. Wound care RN will provide ostomy education. Pt does not have a preference for DME/HH provider. PCP is Rachell HALLMAN and he gets rx filled at [facility] in [place]. Pt is independent with his care at home and works full time job. Pt's friend will transport at d/c. CM will follow and assist as needed.   Discharge Needs Assessment       Row Name 08/06/24 1229       Living Environment    People in Home alone    Current Living Arrangements home    Primary Care Provided by self    Provides Primary Care For no one    Family Caregiver if Needed none    Quality of Family Relationships unable to assess    Able to Return to Prior Arrangements yes       Resource/Environmental Concerns    Transportation Concerns none       Transition Planning    Patient/Family Anticipates Transition to home    Patient/Family Anticipated Services at Transition home health care    Transportation Anticipated family or friend will provide       Discharge Needs Assessment    Readmission Within the Last 30 Days no previous admission in last 30 days    Equipment Currently Used at Home none    Concerns to be Addressed adjustment to diagnosis/illness    Anticipated Changes Related to Illness none    Equipment Needed After Discharge oxygen;colostomy/ostomy supplies    Patient's Choice of Community Agency(s) Pt does not have HH but may need HH at d/c with new ostomy, lives in [place] and does not have a preference of provider.                Discharge Plan       Row Name 08/06/24 1238       Plan    Plan CM spoke with pt at  the bedside. Pt lives at home alone and plans to return at d/c. Pt does not use any DME but has RW and rollator if needed. Pt has had oxygen in the past and may need oxygen at d/c. Urology plans to d/c with F/C. Pt does not have HH but may need HH for new ostomy. Wound care RN will provide ostomy education. Pt does not have a preference for DME/HH provider. PCP is Rachell HALLMAN and he gets rx filled at [facility] in [place]. Pt is independent with his care at home and works full time job. Pt's friend will transport at d/c. CM will follow and assist as needed.    Patient/Family in Agreement with Plan yes               Dixie Rose RN

## 2024-08-06 NOTE — PLAN OF CARE
Goal Outcome Evaluation:  Plan of Care Reviewed With: patient        Progress: no change  Outcome Evaluation: Pt resting in bed during assessment, Pt has sat up on side of the bed during this shift. Pt has no complaints or concerns at this time. No acute changes to note. Will cont POC.

## 2024-08-06 NOTE — PROGRESS NOTES
Chief Complaint: Colovesicular fistula    History of Present Illness:  Mr. Arriaga is a pleasant 58-year-old male 4 days status post colovesicular fistula repair with bilateral ureteral stent placement by Dr. Salazar.  Dolling Benton catheter was placed by Dr. Banerjee a postop.  I was into the patient's room this morning he was lying in bed no acute distress.  He denies any complaints overnight.  REBECCA tube drain has very minimal output that is bloody in color.  Colostomy has good bowel movements noted.  Incision site is healing well.  Urine is also improving.  Kidney function remains stable.    Vital Signs  Temp:  [97.7 °F (36.5 °C)-99 °F (37.2 °C)] 97.7 °F (36.5 °C)  Heart Rate:  [65-97] 68  Resp:  [18-20] 18  BP: (134-175)/(78-98) 160/78  Body mass index is 36.57 kg/m².      Intake/Output Summary (Last 24 hours) at 8/6/2024 0810  Last data filed at 8/6/2024 0332  Gross per 24 hour   Intake 1495.65 ml   Output 2480 ml   Net -984.35 ml     Intake & Output (last 3 days)         08/03 0701  08/04 0700 08/04 0701  08/05 0700 08/05 0701  08/06 0700 08/06 0701  08/07 0700    P.O. 480 860 840     I.V. (mL/kg) 1216.7 (10.5) 1920 (16.6) 655.7 (5.7)     Other        IV Piggyback        Total Intake(mL/kg) 1696.7 (14.6) 2780 (24) 1495.7 (12.9)     Urine (mL/kg/hr) 1000 (0.4) 2250 (0.8) 2200 (0.8)     Drains 40 45 30     Stool  350 250     Total Output 1040 2645 2480     Net +656.7 +135 -984.4                     Physical exam:  PHYSICAL EXAM  GENERAL:  A well developed, well nourished, white male in no acute distress.  HEENT:  Pupils equally round, reactive to light.  Extraocular muscles intact.  CARDIOVASCULAR:  Regular rate and rhythm.  No murmurs, gallops or rubs.  LUNGS:  Clear to auscultation bilaterally.  ABDOMEN:  Soft, nontender, nondistended with positive bowel sounds.  Colostomy in place with bowel movements present.  Incision site healing well.  REBECCA drain with minimal output.  : Indwelling Benton catheter in place with  good urinary output noted.  Urine improving in color.  EXTREMITIES:  No clubbing, cyanosis or edema bilaterally.  NEURO:  Cranial nerves grossly intact.  No focal deficits.  PSYCH:  Alert and oriented x3.     Results Review:  Lab Results   Component Value Date    WBC 15.60 (H) 08/06/2024    HGB 12.5 (L) 08/06/2024    HCT 38.5 08/06/2024    MCV 99.0 (H) 08/06/2024     08/06/2024     Lab Results   Component Value Date    GLUCOSE 120 (H) 08/05/2024    BUN 10 08/05/2024    CREATININE 0.82 08/05/2024    BCR 12.2 08/05/2024    CO2 20.6 (L) 08/05/2024    CALCIUM 9.2 08/05/2024       Imaging Results (Last 72 Hours)       Procedure Component Value Units Date/Time    XR Chest 1 View [[id number]] Collected: 08/03/24 1947     Updated: 08/03/24 1950    Narrative:      Comparison: None     Findings:  1.  No consolidation  2.  No pulmonary edema, pleural effusion or pneumothorax       Impression:      Impression:  No acute process.        This report was finalized on 8/3/2024 7:48 PM by Ibrahima Trujillo DO.                      Assessment & Plan   Colovesicular fistula -patient is roughly 2 days status post stent placement by Dr. Salazar with colovesicular repair by Dr. Hays.  He is healing well and urine is improving.  Will schedule the patient for 7 to 10-day post discharge follow-up for discussion of stent removals.  Thank you for allowing us to participate in Mr. Arriaga's care.  Do not hesitate to call with any questions or concerns.        This document has been electronically signed by Peterson Taylor PA-C  August 6, 2024 08:10 EDT    Part of this note may be an electronic transcription/translation of spoken language to printed text using the Dragon Dictation System.

## 2024-08-06 NOTE — PLAN OF CARE
Goal Outcome Evaluation:  Plan of Care Reviewed With: patient        Progress: no change  Outcome Evaluation: Pt VSS on 4L NC this shift. Pt ambulated in room and sat up in chair, pt got SOA when ambulating. REBECCA drain stripped and emptied. Pt expresses no further concerns at this time, will continue POC.

## 2024-08-06 NOTE — PROGRESS NOTES
POD#4 colectomy and colostomy    He is taking the regular diet but feels bloated. Afeb and vss. REBECCA output is low and mostly serous. The urine is still a little dark. The ostomy is putting out stool and wound looks ok. WBC is coming down slowly as is the CRP. IV antibiotics have stopped, so if WBC stays down and he is doing ok, possibly discharge soon.

## 2024-08-06 NOTE — PAYOR COMM NOTE
"Celestine Cook (58 y.o. Male)       Date of Birth   1965    Social Security Number       Address   [address] [address]    Home Phone       MRN   [id number]       Advent   None    Marital Status                               Admission Date   8/2/24    Admission Type   Elective    Admitting Provider   Boogie Hays MD    Attending Provider   Boogie Hays MD    Department, Room/Bed   [address], 3338/1P       Discharge Date       Discharge Disposition       Discharge Destination                                 Attending Provider: Boogie Hays MD    Allergies: Lisinopril    Isolation: None   Infection: None   Code Status: CPR    Ht: 177.8 cm (70\")   Wt: 116 kg (254 lb 14.4 oz)    Admission Cmt: None   Principal Problem: Colovesical fistula [N32.1]                   Active Insurance as of 8/2/2024       Primary Coverage       Payor Plan Insurance Group Employer/Plan Group    [employer] PPO [id number]       Payor Plan Address Payor Plan Phone Number Payor Plan Fax Number Effective Dates    [address] [phone]  1/1/2024 - None Entered    [facility] [id number]         Subscriber Name Subscriber Birth Date Member ID       CELESTINE COOK 1965 [id number]                     Emergency Contacts        (Rel.) Home Phone Work Phone Mobile Phone    DANIELA COOK (Relative) [phone] -- --    Mike Campos (Relative) -- -- [phone]              Vital Signs (last day)       Date/Time Temp Temp src Pulse Resp BP Patient Position SpO2    08/06/24 1000 98.4 (36.9) Oral 67 20 162/94 Lying 94    08/06/24 0930 -- -- 66 -- 154/88 -- --    08/06/24 0705 -- -- 65 20 -- -- --    08/06/24 0655 -- -- 63 18 -- -- 93    08/06/24 0500 97.7 (36.5) Oral 68 18 160/78 Lying 92    08/06/24 0332 98.8 (37.1) Oral 65 18 175/98 Lying --    08/06/24 0039 -- -- 68 18 -- -- 91    08/06/24 0028 -- -- 65 18 -- -- 91    08/05/24 2255 " 98.4 (36.9) Oral 69 18 165/87 Lying --    08/05/24 1953 98.7 (37.1) Oral 97 18 165/80 Lying --    08/05/24 1828 -- -- 79 18 -- -- 92    08/05/24 1818 -- -- 77 18 -- -- 90    08/05/24 1507 -- -- 78 -- 158/90 -- --    08/05/24 1423 -- -- -- -- -- -- 91    08/05/24 1419 -- -- 76 18 147/83 Lying 91    08/05/24 1317 -- -- 77 20 -- -- 94    08/05/24 1301 -- -- 73 20 -- -- 90    08/05/24 1015 99 (37.2) Oral 77 20 134/84 Sitting --    08/05/24 0952 -- -- 80 20 -- -- 94    08/05/24 0939 -- -- 75 20 -- -- 91    08/05/24 0735 -- -- 78 20 -- -- 94    08/05/24 0717 -- -- 68 20 -- -- 91    08/05/24 0643 98.2 (36.8) Oral 67 20 151/88 Lying 91    08/05/24 0300 98.4 (36.9) Oral 70 18 155/89 Lying 95    08/05/24 0134 -- -- 64 20 -- -- 99          Intake & Output (last day)         08/05 0701 08/06 0700 08/06 0701  08/07 0700    P.O. 840 240    I.V. (mL/kg) 655.7 (5.7)     Total Intake(mL/kg) 1495.7 (12.9) 240 (2.1)    Urine (mL/kg/hr) 2200 (0.8)     Drains 30     Stool 250     Total Output 2480     Net -984.4 +240                Medication Administration Report for Celestine Arriaga as of 8/5/24 through 8/6/24     Legend:    Given Hold Not Given Due Canceled Entry Other Actions    Time Time (Time) Time Time-Action         Discontinued     Completed     Future     MAR Hold     Linked             Medications 08/05/24 08/06/24     acetaminophen (TYLENOL) tablet 1,000 mg  Dose: 1,000 mg  Freq: Every 6 Hours Route: PO  Start: 08/03/24 1000     Admin Instructions:   Based on patient request - if ordered for moderate or severe pain, provider allows for administration of a medication prescribed for a lower pain scale.    Do not exceed 4 grams of acetaminophen in a 24 hr period. Max dose of 2gm for AST/ALT greater than 120 units/L.    If given for pain, use the following pain scale:   Mild Pain = Pain Score of 1-3, CPOT 1-2  Moderate Pain = Pain Score of 4-6, CPOT 3-4  Severe Pain = Pain Score of 7-10, CPOT 5-8      0310-Given      0923-Given     1655-Given     2121-Given         0328-Given     0932-Given     1600     2200           albuterol (PROVENTIL) nebulizer solution 0.083% 2.5 mg/3mL  Dose: 2.5 mg  Freq: Every 4 Hours PRN Route: NEBULIZATION  PRN Reasons: Shortness of Air,Wheezing  Start: 08/04/24 1042     Admin Instructions:   Include Respiratory Treatment Education      0939-Given               amLODIPine (NORVASC) tablet 10 mg  Dose: 10 mg  Freq: Daily Route: PO  Start: 08/03/24 0900     Admin Instructions:   Hold for SBP less than 100, DBP less than 60.  Caution: Look alike/sound alike drug alert. Avoid grapefruit juice.      0816-Given            0932-Given               sennosides-docusate (PERICOLACE) 8.6-50 MG per tablet 2 tablet  Dose: 2 tablet  Freq: 2 Times Daily Route: PO  Start: 08/02/24 2100     Admin Instructions:   HOLD MEDICATION IF PATIENT HAS HAD BOWEL MOVEMENT. Start bowel management regimen if patient has not had a bowel movement after 12 hours.      0816-Given     2121-Given           0932-Given     2100             And   polyethylene glycol (MIRALAX) packet 17 g  Dose: 17 g  Freq: Daily PRN Route: PO  PRN Reason: Constipation  PRN Comment: Use if senna-docusate is ineffective  Start: 08/02/24 1620     Admin Instructions:   Use if no bowel movement after 12 hours. Mix in 6-8 ounces of water.  Use 4-8 ounces of water, tea, or juice for each 17 gram dose.          And   bisacodyl (DULCOLAX) EC tablet 5 mg  Dose: 5 mg  Freq: Daily PRN Route: PO  PRN Reason: Constipation  PRN Comment: Use if polyethylene glycol is ineffective  Start: 08/02/24 1620     Admin Instructions:   Use if no bowel movement after 12 hours.  Swallow whole. Do not crush, split, or chew tablet.          And   bisacodyl (DULCOLAX) suppository 10 mg  Dose: 10 mg  Freq: Daily PRN Route: RE  PRN Reason: Constipation  PRN Comment: Use if bisacodyl oral is ineffective  Start: 08/02/24 1620 End: 08/03/24 0815     Admin Instructions:   Use if no bowel  movement after 12 hours.  Hold for diarrhea          heparin (porcine) 5000 UNIT/ML injection 5,000 Units  Dose: 5,000 Units  Freq: Every 8 Hours Scheduled Route: SC  Indications of Use: PROPHYLAXIS OF VENOUS THROMBOEMBOLISM  Start: 08/03/24 1400      0513-Given     1507-Given     2121-Given          0544-Given     1400     2200            hydrALAZINE (APRESOLINE) tablet 50 mg  Dose: 50 mg  Freq: 3 Times Daily Route: PO  Start: 08/02/24 1715     Admin Instructions:   Hold for SBP less than 100, DBP less than 60.  Caution: Look alike/sound alike drug alert      0815-Given     1507-Given     2121-Given          0932-Given     1600     2100            HYDROmorphone (DILAUDID) injection 0.5 mg  Dose: 0.5 mg  Freq: Every 2 Hours PRN Route: IV  PRN Reason: Severe Pain  Start: 08/02/24 1620 End: 08/07/24 1619     Admin Instructions:   Based on patient request - if ordered for moderate or severe pain, provider allows for administration of a medication prescribed for a lower pain scale.  If given for pain, use the following pain scale:  Mild Pain = Pain Score of 1-3, CPOT 1-2  Moderate Pain = Pain Score of 4-6, CPOT 3-4  Severe Pain = Pain Score of 7-10, CPOT 5-8          ipratropium-albuterol (DUO-NEB) nebulizer solution 3 mL  Dose: 3 mL  Freq: Every 6 Hours - RT Route: NEBULIZATION  Start: 08/04/24 1300     Admin Instructions:   Include Respiratory Treatment Education      0134-Given     0717-Given     1301-Given     1818-Given         0028-Given     0655-Given     1300     1900           ketorolac (TORADOL) injection 15 mg  Dose: 15 mg  Freq: Every 6 Hours PRN Route: IV  PRN Reason: Severe Pain  Start: 08/03/24 0814 End: 08/07/24 1619     Admin Instructions:   Based on patient request - if ordered for moderate or severe pain, provider allows for administration of a medication prescribed for a lower pain scale.      If given for pain, use the following pain scale:  Mild Pain = Pain Score of 1-3, CPOT 1-2  Moderate Pain =  "Pain Score of 4-6, CPOT 3-4  Severe Pain = Pain Score of 7-10, CPOT 5-8      1625-Given               lactated ringers infusion  Rate: 50 mL/hr Dose: 50 mL/hr  Freq: Continuous Route: IV  Start: 08/02/24 1715      1625-New Bag            0930-New Bag              Magnesium Standard Dose Replacement - Follow Nurse / BPA Driven Protocol  Freq: As Needed Route: XX  PRN Reason: Other  Start: 08/03/24 0815     Admin Instructions:   Open Order & Select \"[facility] Electrolyte Replacement Protocol Algorithm\" to View Details          methocarbamol (ROBAXIN) tablet 750 mg  Dose: 750 mg  Freq: 4 Times Daily Route: PO  Start: 08/03/24 1200      0816-Given     1138-Given     1701-Given     2121-Given         0932-Given     1200     1800     2100           metoprolol tartrate (LOPRESSOR) tablet 25 mg  Dose: 25 mg  Freq: 2 Times Daily Route: PO  Start: 08/02/24 2100     Admin Instructions:   Hold for SBP less than 100, DBP less than 60, or heart rate less than 50. If a dose is held, please contact the provider.      0816-Given     2121-Given           0930-Given     2100             nitroglycerin (NITROSTAT) SL tablet 0.4 mg  Dose: 0.4 mg  Freq: Every 5 Minutes PRN Route: SL  PRN Reason: Chest Pain  Start: 08/02/24 1620     Admin Instructions:   Sublingual. Do not chew, crush, or swallow. Place under tongue and allow to dissolve. May take a small sip of water prior to placing under the tongue to aid dissolution.  May administer up to 3 doses per episode.          ondansetron (ZOFRAN) injection 4 mg  Dose: 4 mg  Freq: Every 6 Hours PRN Route: IV  PRN Reasons: Nausea,Vomiting  Start: 08/02/24 1620     Admin Instructions:   If BOTH ondansetron (ZOFRAN) and promethazine (PHENERGAN) are ordered use ondansetron first and THEN promethazine IF ondansetron is ineffective.          oxyCODONE (ROXICODONE) immediate release tablet 5 mg  Dose: 5 mg  Freq: Every 4 Hours PRN Route: PO  PRN Reason: Severe Pain  Start: 08/03/24 0814 End: 08/10/24 0813   " "  Admin Instructions:       If given for pain, use the following pain scale:  Mild Pain = Pain Score of 1-3, CPOT 1-2  Moderate Pain = Pain Score of 4-6, CPOT 3-4  Severe Pain = Pain Score of 7-10, CPOT 5-8      0313-Given            0413-Given              Phosphorus Replacement - Follow Nurse / BPA Driven Protocol  Freq: As Needed Route: XX  PRN Reason: Other  Start: 08/03/24 0815     Admin Instructions:   Open Order & Select \"[facility] Electrolyte Replacement Protocol Algorithm\" to View Details          Potassium Replacement - Follow Nurse / BPA Driven Protocol  Freq: As Needed Route: XX  PRN Reason: Other  Start: 08/03/24 0815     Admin Instructions:   Open Order & Select \"[facility] Electrolyte Replacement Protocol Algorithm\" to View Details          sertraline (ZOLOFT) tablet 100 mg  Dose: 100 mg  Freq: Daily Route: PO  Start: 08/03/24 0900      0816-Given            0932-Given              sodium chloride 0.9 % flush 10 mL  Dose: 10 mL  Freq: As Needed Route: IV  PRN Reason: Line Care  Start: 08/02/24 1620          sodium chloride 0.9 % flush 10 mL  Dose: 10 mL  Freq: Every 12 Hours Scheduled Route: IV  Start: 08/02/24 2100      0817-Given     2121-Given           0933-Given     2100             sodium chloride 0.9 % infusion 40 mL  Dose: 40 mL  Freq: As Needed Route: IV  PRN Reason: Line Care  Start: 08/02/24 1620     Admin Instructions:   Following administration of an IV intermittent medication, flush line with 40mL NS at 100mL/hr.          Completed Medications  Medications 08/05/24 08/06/24      ceFOXitin (MEFOXIN) 1,000 mg in sodium chloride 0.9 % 100 mL IVPB-VTB  Dose: 1,000 mg  Freq: Every 12 Hours Route: IV  Indications of Use: INTRA-ABDOMINAL INFECTION  Start: 08/03/24 0200 End: 08/04/24 1515     Admin Instructions:   Caution: Look alike/sound alike drug alert                Activate vial before using.          cefOXItin (MEFOXIN) 2,000 mg in sodium chloride 0.9 % 100 mL IVPB-VTB  Dose: 2,000 mg  Freq: Once " Route: IV  Indications of Use: PERIOPERATIVE PHARMACOPROPHYLAXIS  Start: 08/02/24 1044 End: 08/02/24 1404     Admin Instructions:   Caution: Look alike/sound alike drug alert                Activate vial before using.          ipratropium-albuterol (DUO-NEB) nebulizer solution 3 mL  Dose: 3 mL  Freq: Once As Needed Route: NEBULIZATION  PRN Reasons: Wheezing,Shortness of Air  PRN Comment: bronchospasm  Start: 08/02/24 1510 End: 08/02/24 1518          lactated ringers infusion  Dose: 125 mL/hr  Freq: Once Route: IV  Start: 08/02/24 1050 End: 08/02/24 1451          Discontinued Medications  Medications 08/05/24 08/06/24      acetaminophen (TYLENOL) tablet 500 mg  Dose: 500 mg  Freq: Every 6 Hours PRN Route: PO  PRN Reason: Mild Pain  Start: 08/02/24 1620 End: 08/03/24 0815     Admin Instructions:   If given for fever, use fever parameter: fever greater than 100.4 °F  Based on patient request - if ordered for moderate or severe pain, provider allows for administration of a medication prescribed for a lower pain scale.    Do not exceed 4 grams of acetaminophen in a 24 hr period. Max dose of 2gm for AST/ALT greater than 120 units/L.    If given for pain, use the following pain scale:   Mild Pain = Pain Score of 1-3, CPOT 1-2  Moderate Pain = Pain Score of 4-6, CPOT 3-4  Severe Pain = Pain Score of 7-10, CPOT 5-8          albuterol (PROVENTIL) nebulizer solution 0.083% 2.5 mg/3mL  Dose: 2.5 mg  Freq: Every 6 Hours - RT Route: NEBULIZATION  Start: 08/04/24 1300 End: 08/04/24 1042     Admin Instructions:   Include Respiratory Treatment Education          albuterol (PROVENTIL) nebulizer solution 0.083% 2.5 mg/3mL  Dose: 2.5 mg  Freq: Every 6 Hours PRN Route: NEBULIZATION  PRN Reason: Wheezing  Start: 08/02/24 1653 End: 08/04/24 0956     Admin Instructions:   Include Respiratory Treatment Education          albuterol sulfate HFA (PROVENTIL HFA;VENTOLIN HFA;PROAIR HFA) inhaler 1 puff  Dose: 1 puff  Freq: Every 6 Hours PRN  Route: IN  PRN Reason: Wheezing  Start: 08/02/24 1620 End: 08/02/24 1653     Admin Instructions:   Include Respiratory Treatment Education   Shake well.          BUPivacaine-meloxicam ER (ZYNRELEF) 200-6 MG/7ML injection  Freq: As Needed  Start: 08/02/24 1429 End: 08/02/24 1500          fentaNYL citrate (PF) (SUBLIMAZE) injection 50 mcg  Dose: 50 mcg  Freq: Every 5 Minutes PRN Route: IV  PRN Reasons: Moderate Pain,Severe Pain  Start: 08/02/24 1510 End: 08/02/24 1611     Admin Instructions:   Maximum total dose of fentanyl is 100 mcg.  If given for pain, use the following pain scale:  Mild Pain = Pain Score of 1-3, CPOT 1-2  Moderate Pain = Pain Score of 4-6, CPOT 3-4  Severe Pain = Pain Score of 7-10, CPOT 5-8          ketorolac (TORADOL) injection 30 mg  Dose: 30 mg  Freq: Every 6 Hours PRN Route: IV  PRN Reason: Severe Pain  Start: 08/02/24 1620 End: 08/03/24 0815     Admin Instructions:   Based on patient request - if ordered for moderate or severe pain, provider allows for administration of a medication prescribed for a lower pain scale.      If given for pain, use the following pain scale:  Mild Pain = Pain Score of 1-3, CPOT 1-2  Moderate Pain = Pain Score of 4-6, CPOT 3-4  Severe Pain = Pain Score of 7-10, CPOT 5-8          lactated ringers infusion  Dose: 100 mL/hr  Freq: Once As Needed Route: IV  PRN Comment: recovery only  Start: 08/02/24 1510 End: 08/02/24 1611          meperidine (DEMEROL) injection 12.5 mg  Dose: 12.5 mg  Freq: Every 5 Minutes PRN Route: IV  PRN Reason: Shivering  PRN Comment: May repeat x 1  Start: 08/02/24 1510 End: 08/02/24 1611     Admin Instructions:             midazolam (VERSED) injection 1 mg  Dose: 1 mg  Freq: Every 10 Minutes PRN Route: IV  PRN Comment: Anxiety prophylaxis, Pre-op comfort  Start: 08/02/24 1048 End: 08/02/24 1506     Admin Instructions:   May repeat dose in 10 minutes one time then contact provider for additional orders.            ondansetron (ZOFRAN)  injection 4 mg  Dose: 4 mg  Freq: As Needed Route: IV  PRN Reasons: Nausea,Vomiting  Start: 08/02/24 1510 End: 08/02/24 1611     Admin Instructions:   If BOTH ondansetron (ZOFRAN) and promethazine (PHENERGAN) are ordered use ondansetron first and THEN promethazine IF ondansetron is ineffective.          oxyCODONE-acetaminophen (PERCOCET) 5-325 MG per tablet 1 tablet  Dose: 1 tablet  Freq: Every 8 Hours PRN Route: PO  PRN Reason: Moderate Pain  Start: 08/02/24 1620 End: 08/03/24 0815     Admin Instructions:   Based on patient request - if ordered for moderate or severe pain, provider allows for administration of a medication prescribed for a lower pain scale.  [ELIESER]    Do not exceed 4 grams of acetaminophen in a 24 hr period. Max dose of 2gm for AST/ALT greater than 120 units/L        If given for pain, use the following pain scale:   Mild Pain = Pain Score of 1-3, CPOT 1-2  Moderate Pain = Pain Score of 4-6, CPOT 3-4  Severe Pain = Pain Score of 7-10, CPOT 5-8          oxyCODONE-acetaminophen (PERCOCET) 5-325 MG per tablet 1 tablet  Dose: 1 tablet  Freq: Once As Needed Route: PO  PRN Reason: Moderate Pain  Start: 08/02/24 1510 End: 08/02/24 1611     Admin Instructions:   [ELIESER]    Do not exceed 4 grams of acetaminophen in a 24 hr period. Max dose of 2gm for AST/ALT greater than 120 units/L        If given for pain, use the following pain scale:   Mild Pain = Pain Score of 1-3, CPOT 1-2  Moderate Pain = Pain Score of 4-6, CPOT 3-4  Severe Pain = Pain Score of 7-10, CPOT 5-8          sodium chloride (NS) irrigation solution  Freq: As Needed  Start: 08/02/24 1242 End: 08/02/24 1500          sodium chloride 0.9 % flush 10 mL  Dose: 10 mL  Freq: As Needed Route: IV  PRN Reason: Line Care  Start: 08/02/24 1048 End: 08/02/24 1506          sodium chloride 0.9 % flush 10 mL  Dose: 10 mL  Freq: Every 12 Hours Scheduled Route: IV  Start: 08/02/24 1050 End: 08/02/24 1506          sodium chloride 0.9 % infusion 40 mL  Dose: 40  mL  Freq: As Needed Route: IV  PRN Reason: Line Care  Start: 08/02/24 1048 End: 08/02/24 1506     Admin Instructions:   Following administration of an IV intermittent medication, flush line with 40mL NS at 100mL/hr.                      Lab Results (last 48 hours)       Procedure Component Value Units Date/Time    C-reactive Protein [[id number]]  (Abnormal) Collected: 08/06/24 0320    Specimen: Blood Updated: 08/06/24 0411     C-Reactive Protein 19.34 mg/dL     CBC (No Diff) [[id number]]  (Abnormal) Collected: 08/06/24 0320    Specimen: Blood Updated: 08/06/24 0342     WBC 15.60 10*3/mm3      RBC 3.89 10*6/mm3      Hemoglobin 12.5 g/dL      Hematocrit 38.5 %      MCV 99.0 fL      MCH 32.1 pg      MCHC 32.5 g/dL      RDW 15.2 %      RDW-SD 54.7 fl      MPV 10.3 fL      Platelets 297 10*3/mm3     TISSUE EXAM, [facility] ([facility]) [[id number]] Collected: 08/02/24 1332    Specimen: Tissue from Large Intestine, Appendix; Tissue from Large Intestine, Sigmoid Colon Updated: 08/05/24 0616    Basic Metabolic Panel [[id number]]  (Abnormal) Collected: 08/05/24 0235    Specimen: Blood Updated: 08/05/24 0303     Glucose 120 mg/dL      BUN 10 mg/dL      Creatinine 0.82 mg/dL      Sodium 137 mmol/L      Potassium 4.0 mmol/L      Chloride 107 mmol/L      CO2 20.6 mmol/L      Calcium 9.2 mg/dL      BUN/Creatinine Ratio 12.2     Anion Gap 9.4 mmol/L      eGFR 103.1 mL/min/1.73     Narrative:      GFR Normal >60  Chronic Kidney Disease <60  Kidney Failure <15      C-reactive Protein [[id number]]  (Abnormal) Collected: 08/05/24 0235    Specimen: Blood Updated: 08/05/24 0303     C-Reactive Protein 26.37 mg/dL     CBC (No Diff) [[id number]]  (Abnormal) Collected: 08/05/24 0235    Specimen: Blood Updated: 08/05/24 0243     WBC 17.32 10*3/mm3      RBC 3.55 10*6/mm3      Hemoglobin 11.6 g/dL      Hematocrit 36.0 %      .4 fL      MCH 32.7 pg      MCHC 32.2 g/dL      RDW 15.1 %      RDW-SD 57.0 fl      MPV 10.0 fL      Platelets 256  10*3/mm3           Imaging Results (Last 48 Hours)       ** No results found for the last 48 hours. **          Orders (last 48 hrs)        Start     Ordered    08/07/24 0600  CBC (No Diff)  Morning Draw         08/06/24 0835    08/07/24 0600  C-reactive Protein  Morning Draw         08/06/24 0835    08/06/24 0600  CBC (No Diff)  Morning Draw         08/05/24 0857    08/06/24 0600  C-reactive Protein  Morning Draw         08/05/24 0857    08/05/24 0858  Diet: Gastrointestinal; Fiber-Restricted; Texture: Soft to Chew (NDD 3); Soft to Chew: Whole Meat; Fluid Consistency: Thin (IDDSI 0)  Diet Effective Now         08/05/24 0857    08/05/24 0600  CBC (No Diff)  Morning Draw         08/04/24 0828    08/05/24 0600  Basic Metabolic Panel  Morning Draw         08/04/24 0828    08/05/24 0600  C-reactive Protein  Morning Draw         08/04/24 0828    08/05/24 0000  Wound Ostomy Eval & Treat  Once         08/04/24 0836    08/04/24 1300  albuterol (PROVENTIL) nebulizer solution 0.083% 2.5 mg/3mL  Every 6 Hours - RT,   Status:  Discontinued         08/04/24 0956    08/04/24 1300  ipratropium-albuterol (DUO-NEB) nebulizer solution 3 mL  Every 6 Hours - RT         08/04/24 1042    08/04/24 1057  Strip Bulb Suction  Every Shift       08/04/24 1056    08/04/24 1042  albuterol (PROVENTIL) nebulizer solution 0.083% 2.5 mg/3mL  Every 4 Hours PRN         08/04/24 1042    08/03/24 1400  heparin (porcine) 5000 UNIT/ML injection 5,000 Units  Every 8 Hours Scheduled         08/03/24 0815    08/03/24 1200  methocarbamol (ROBAXIN) tablet 750 mg  4 Times Daily         08/03/24 0815    08/03/24 1000  acetaminophen (TYLENOL) tablet 1,000 mg  Every 6 Hours         08/03/24 0815    08/03/24 1000  Incentive Spirometry  Every 4 Hours While Awake       08/03/24 0815    08/03/24 0900  amLODIPine (NORVASC) tablet 10 mg  Daily         08/02/24 1620    08/03/24 0900  sertraline (ZOLOFT) tablet 100 mg  Daily         08/02/24 1620    08/03/24 0815   "Phosphorus Replacement - Follow Nurse / BPA Driven Protocol  As Needed         08/03/24 0816    08/03/24 0815  Magnesium Standard Dose Replacement - Follow Nurse / BPA Driven Protocol  As Needed         08/03/24 0816    08/03/24 0815  Potassium Replacement - Follow Nurse / BPA Driven Protocol  As Needed         08/03/24 0816    08/03/24 0814  oxyCODONE (ROXICODONE) immediate release tablet 5 mg  Every 4 Hours PRN         08/03/24 0815    08/03/24 0814  ketorolac (TORADOL) injection 15 mg  Every 6 Hours PRN         08/03/24 0815    08/03/24 0200  ceFOXitin (MEFOXIN) 1,000 mg in sodium chloride 0.9 % 100 mL IVPB-VTB  Every 12 Hours         08/02/24 1620 08/02/24 2100  metoprolol tartrate (LOPRESSOR) tablet 25 mg  2 Times Daily         08/02/24 1620 08/02/24 2100  sodium chloride 0.9 % flush 10 mL  Every 12 Hours Scheduled         08/02/24 1620 08/02/24 2100  sennosides-docusate (PERICOLACE) 8.6-50 MG per tablet 2 tablet  2 Times Daily        Placed in \"And\" Linked Group    08/02/24 1620    08/02/24 2000  Vital Signs  Every 4 Hours      Comments: Per per hospital policy    08/02/24 1620 08/02/24 1800  Oral Care  2 Times Daily       08/02/24 1620    08/02/24 1715  hydrALAZINE (APRESOLINE) tablet 50 mg  3 Times Daily         08/02/24 1620    08/02/24 1715  lactated ringers infusion  Continuous         08/02/24 1620    08/02/24 1620  polyethylene glycol (MIRALAX) packet 17 g  Daily PRN        Placed in \"And\" Linked Group    08/02/24 1620    08/02/24 1620  bisacodyl (DULCOLAX) EC tablet 5 mg  Daily PRN        Placed in \"And\" Linked Group    08/02/24 1620    08/02/24 1620  nitroglycerin (NITROSTAT) SL tablet 0.4 mg  Every 5 Minutes PRN         08/02/24 1620    08/02/24 1620  Intake & Output  Every Shift       08/02/24 1620 08/02/24 1620  sodium chloride 0.9 % flush 10 mL  As Needed         08/02/24 1620    08/02/24 1620  sodium chloride 0.9 % infusion 40 mL  As Needed         08/02/24 1620    08/02/24 1620  " HYDROmorphone (DILAUDID) injection 0.5 mg  Every 2 Hours PRN         08/02/24 1620    08/02/24 1620  ondansetron (ZOFRAN) injection 4 mg  Every 6 Hours PRN         08/02/24 1620    Unscheduled  Change Dressing  As Needed      Comments: Change midline dressing covering drain as needed with an island dressing.    08/04/24 1122                     Physician Progress Notes (last 48 hours)        Boogie Hays MD at 08/06/24 0836          POD#4 colectomy and colostomy    He is taking the regular diet but feels bloated. Afeb and vss. REBECCA output is low and mostly serous. The urine is still a little dark. The ostomy is putting out stool and wound looks ok. WBC is coming down slowly as is the CRP. IV antibiotics have stopped, so if WBC stays down and he is doing ok, possibly discharge soon.    Electronically signed by Boogie Hays MD at 08/06/24 0839       Peterson Taylor PA-C at 08/06/24 0810       Attestation signed by Gibran Salazar MD at 08/06/24 0922    I have reviewed this documentation and agree.                  Chief Complaint: Colovesicular fistula    History of Present Illness:  Mr. Arriaga is a pleasant 58-year-old male 4 days status post colovesicular fistula repair with bilateral ureteral stent placement by Dr. Salazar.  Dolling Benton catheter was placed by Dr. Banerjee a postop.  I was into the patient's room this morning he was lying in bed no acute distress.  He denies any complaints overnight.  REBECCA tube drain has very minimal output that is bloody in color.  Colostomy has good bowel movements noted.  Incision site is healing well.  Urine is also improving.  Kidney function remains stable.    Vital Signs  Temp:  [97.7 °F (36.5 °C)-99 °F (37.2 °C)] 97.7 °F (36.5 °C)  Heart Rate:  [65-97] 68  Resp:  [18-20] 18  BP: (134-175)/(78-98) 160/78  Body mass index is 36.57 kg/m².      Intake/Output Summary (Last 24 hours) at 8/6/2024 0810  Last data filed at 8/6/2024 0332  Gross per 24 hour   Intake 1495.65  ml   Output 2480 ml   Net -984.35 ml     Intake & Output (last 3 days)         08/03 0701 08/04 0700 08/04 0701  08/05 0700 08/05 0701 08/06 0700 08/06 0701 08/07 0700    P.O. 480 860 840     I.V. (mL/kg) 1216.7 (10.5) 1920 (16.6) 655.7 (5.7)     Other        IV Piggyback        Total Intake(mL/kg) 1696.7 (14.6) 2780 (24) 1495.7 (12.9)     Urine (mL/kg/hr) 1000 (0.4) 2250 (0.8) 2200 (0.8)     Drains 40 45 30     Stool  350 250     Total Output 1040 2645 2480     Net +656.7 +135 -984.4                     Physical exam:  PHYSICAL EXAM  GENERAL:  A well developed, well nourished, white male in no acute distress.  HEENT:  Pupils equally round, reactive to light.  Extraocular muscles intact.  CARDIOVASCULAR:  Regular rate and rhythm.  No murmurs, gallops or rubs.  LUNGS:  Clear to auscultation bilaterally.  ABDOMEN:  Soft, nontender, nondistended with positive bowel sounds.  Colostomy in place with bowel movements present.  Incision site healing well.  REBECCA drain with minimal output.  : Indwelling Benton catheter in place with good urinary output noted.  Urine improving in color.  EXTREMITIES:  No clubbing, cyanosis or edema bilaterally.  NEURO:  Cranial nerves grossly intact.  No focal deficits.  PSYCH:  Alert and oriented x3.     Results Review:  Lab Results   Component Value Date    WBC 15.60 (H) 08/06/2024    HGB 12.5 (L) 08/06/2024    HCT 38.5 08/06/2024    MCV 99.0 (H) 08/06/2024     08/06/2024     Lab Results   Component Value Date    GLUCOSE 120 (H) 08/05/2024    BUN 10 08/05/2024    CREATININE 0.82 08/05/2024    BCR 12.2 08/05/2024    CO2 20.6 (L) 08/05/2024    CALCIUM 9.2 08/05/2024       Imaging Results (Last 72 Hours)       Procedure Component Value Units Date/Time    XR Chest 1 View [[id number]] Collected: 08/03/24 1947     Updated: 08/03/24 1950    Narrative:      Comparison: None     Findings:  1.  No consolidation  2.  No pulmonary edema, pleural effusion or pneumothorax       Impression:       Impression:  No acute process.        This report was finalized on 8/3/2024 7:48 PM by Ibrahima Trujillo DO.                      Assessment & Plan   Colovesicular fistula -patient is roughly 2 days status post stent placement by Dr. Salazar with colovesicular repair by Dr. Hays.  He is healing well and urine is improving.  Will schedule the patient for 7 to 10-day post discharge follow-up for discussion of stent removals.  Thank you for allowing us to participate in Mr. Arriaga's care.  Do not hesitate to call with any questions or concerns.        This document has been electronically signed by Peterson Taylor PA-C  August 6, 2024 08:10 EDT    Part of this note may be an electronic transcription/translation of spoken language to printed text using the Dragon Dictation System.    Electronically signed by Gibran Salazar MD at 08/06/24 0922       Boogie Hays MD at 08/05/24 0900          POD#3 sigmoid resection, colostomy for colovesical fistula    He is tolerating full liquids well with no nausea or vomiting. The REBECCA just has a little serosanguinous drainage with no indication of urinary leak. The catheter is in and urine is still a little dark. Cr is ok. Afeb and vss. The wound looks ok and colostomy is putting out stool. He had a extremely scarred down pelvis and pathology is still pending. Try advancing the diet.     Electronically signed by Boogie Hays MD at 08/05/24 0903       Peterson Taylor PA-C at 08/05/24 0745       Attestation signed by Gibran Salazar MD at 08/05/24 0833    I have reviewed this documentation and agree.                  Chief Complaint: Colovesicular fistula    History of Present Illness:  Mr. Arriaga is a pleasant 58-year-old male who was admitted to the hospital 3 days ago post elective colovesicular fistula repair by Dr. Hays.  Dr. Salazar assisted on surgery and placed bilateral ureteral stents.  Indwelling Benton catheter was placed due to fistula repair.   Kidney function remained stable at this time.  White count is elevated at roughly 17,000 but H&H is stable.  As I entered the patient's room this morning he was lying in bed.  Currently receiving a breathing treatment.  He denies any complaints.  Indwelling Benton catheter has yellow urine with slight sediments noted.  He has been ambulating well.  REBECCA drain has some output.  He denies any urological complaints at this time.    Vital Signs  Temp:  [98.2 °F (36.8 °C)-99.2 °F (37.3 °C)] 98.2 °F (36.8 °C)  Heart Rate:  [64-78] 78  Resp:  [18-24] 20  BP: (119-155)/(72-89) 151/88  Body mass index is 36.57 kg/m².      Intake/Output Summary (Last 24 hours) at 8/5/2024 0745  Last data filed at 8/5/2024 0500  Gross per 24 hour   Intake 2780 ml   Output 2645 ml   Net 135 ml     Intake & Output (last 3 days)         08/02 0701  08/03 0700 08/03 0701  08/04 0700 08/04 0701  08/05 0700 08/05 0701  08/06 0700    P.O. 240 480 860     I.V. (mL/kg) 2863 (24.7) 1216.7 (10.5) 1920 (16.6)     Other 600       IV Piggyback 200       Total Intake(mL/kg) 3903 (33.6) 1696.7 (14.6) 2780 (24)     Urine (mL/kg/hr) 75 1000 (0.4) 2250 (0.8)     Drains 70 40 45     Stool   350     Total Output 145 1040 2645     Net +3758 +656.7 +135                     Physical exam:  PHYSICAL EXAM  GENERAL:  A well developed, well nourished, white male in no acute distress.  HEENT:  Pupils equally round, reactive to light.  Extraocular muscles intact.  CARDIOVASCULAR:  Regular rate and rhythm.  No murmurs, gallops or rubs.  LUNGS:  Clear to auscultation bilaterally.  ABDOMEN:  Soft, nontender, nondistended with positive bowel sounds.  Ostomy in place with incision site healing well.  REBECCA drain has some serosanguineous output noted.  : Indwelling Benton catheter in place with yellow urine and some sediments.  Urine is improving.  SKIN:  No rashes or petechiae.  EXTREMITIES:  No clubbing, cyanosis or edema bilaterally.  NEURO:  Cranial nerves grossly intact.  No  focal deficits.  PSYCH:  Alert and oriented x3.     Results Review:  Lab Results   Component Value Date    WBC 17.32 (H) 08/05/2024    HGB 11.6 (L) 08/05/2024    HCT 36.0 (L) 08/05/2024    .4 (H) 08/05/2024     08/05/2024     Lab Results   Component Value Date    GLUCOSE 120 (H) 08/05/2024    BUN 10 08/05/2024    CREATININE 0.82 08/05/2024    BCR 12.2 08/05/2024    CO2 20.6 (L) 08/05/2024    CALCIUM 9.2 08/05/2024       Imaging Results (Last 72 Hours)       Procedure Component Value Units Date/Time    XR Chest 1 View [[id number]] Collected: 08/03/24 1947     Updated: 08/03/24 1950    Narrative:      Comparison: None     Findings:  1.  No consolidation  2.  No pulmonary edema, pleural effusion or pneumothorax       Impression:      Impression:  No acute process.        This report was finalized on 8/3/2024 7:48 PM by Ibrahima Trujillo DO.       [place] Surgery Fluoro [[id number]] Collected: 08/02/24 1339     Updated: 08/02/24 1342    Narrative:      EXAMINATION: [place] SURGERY FLUORO-      CLINICAL INDICATION: ureatal stents        COMPARISON: None available     Total fluoroscopy time 15.8 seconds     Fluoroscopy was provided and 4 images were acquired's stent was placed     For a full procedure report, please see the report provided by the  performing physician        This report was finalized on 8/2/2024 1:40 PM by Dr. Kenneth Jones MD.                      Assessment & Plan   Colovesicular fistula -patient is roughly 2 days status post stent placement by Dr. Salazar with colovesicular repair by Dr. Hays.  He is healing well and urine is improving.  Patient may follow-up in roughly 1 to 2 weeks post discharge for discussion of stent removals.  Thank you for allowing us to participate in Mr. Bart baugh.  Do not hesitate to call with any questions or concerns.          This document has been electronically signed by Peterson Taylor PA-C  August 5, 2024 07:45 EDT    Part of this note may be an electronic  "transcription/translation of spoken language to printed text using the Dragon Dictation System.    Electronically signed by Gibran Salazar MD at 08/05/24 0833       Steven Murray MD at 08/04/24 1150          Patient Name:  Celestine Arriaga  YOB: 1965  [id number]    Surgery Progress Note    Date of visit: 8/4/2024    Subjective   Subjective: Postop day 2    Reported feculent output from ostomy with some leakage from the ostomy appliance.  He has been tolerating clear liquids without nausea or vomiting.  He complains of some soreness at his incisions.  Urine in Benton appears to be clearing.  He has been ambulating  Requiring 3 to 4 L of supplemental oxygen to maintain saturations 88 to low 90s  REBECCA drain serosanguineous.        Objective     Objective:     /72 (BP Location: Left arm, Patient Position: Sitting)   Pulse 76   Temp 98.9 °F (37.2 °C) (Oral)   Resp 20   Ht 177.8 cm (70\")   Wt 116 kg (254 lb 14.4 oz)   SpO2 90%   BMI 36.57 kg/m²     Intake/Output Summary (Last 24 hours) at 8/4/2024 1159  Last data filed at 8/4/2024 0916  Gross per 24 hour   Intake 1696.66 ml   Output 2060 ml   Net -363.34 ml       No acute distress  Abdomen is soft, appropriately tender to palpation.  Some ecchymosis at the inferior aspect of his incision.  Drain is in place.  No cellulitis or purulence.  REBECCA drain is serosanguinous.  Ostomy is congested and edematous, dark red.    Recent labs that are back at this time have been reviewed.   White blood cell count decreasing.  Hemoglobin stable        Assessment & Plan     Assessment/ Plan:    58-year-old class II obese smoker status post open sigmoid colectomy with anastomosis and diverting loop colostomy for colovesical fistula takedown 8/2    Advance to full liquids  DuoNebs added  Continue to ambulate and use incentive spirometry  IV fluids have been turned down yesterday  Continue REBECCA drain to bulb suction  Change abdominal dressing as needed for " drainage  Continue Benton catheter  Continue ostomy bar        This document has been electronically signed by Steven Murray MD   August 4, 2024 11:59 EDT    [facility]          Electronically signed by Steven Murray MD at 08/04/24 1203       Consult Notes (last 48 hours)  Notes from 08/04/24 1107 through 08/06/24 1107   No notes of this type exist for this encounter.

## 2024-08-07 ENCOUNTER — APPOINTMENT (OUTPATIENT)
Dept: GENERAL RADIOLOGY | Facility: HOSPITAL | Age: 59
DRG: 660 | End: 2024-08-07
Payer: COMMERCIAL

## 2024-08-07 LAB
A-A DO2: 148.6 MMHG (ref 0–300)
ARTERIAL PATENCY WRIST A: POSITIVE
ATMOSPHERIC PRESS: 723 MMHG
BASE EXCESS BLDA CALC-SCNC: -3.3 MMOL/L (ref 0–2)
BDY SITE: ABNORMAL
CO2 BLDA-SCNC: 20.6 MMOL/L (ref 22–33)
COHGB MFR BLD: 1.3 % (ref 0–5)
CRP SERPL-MCNC: 13.52 MG/DL (ref 0–0.5)
DEPRECATED RDW RBC AUTO: 56.9 FL (ref 37–54)
ERYTHROCYTE [DISTWIDTH] IN BLOOD BY AUTOMATED COUNT: 15.1 % (ref 12.3–15.4)
GAS FLOW AIRWAY: 4 LPM
HCO3 BLDA-SCNC: 19.7 MMOL/L (ref 20–26)
HCT VFR BLD AUTO: 40.4 % (ref 37.5–51)
HCT VFR BLD CALC: 39.6 % (ref 38–51)
HGB BLD-MCNC: 12.8 G/DL (ref 13–17.7)
HGB BLDA-MCNC: 12.9 G/DL (ref 14–18)
INHALED O2 CONCENTRATION: 36 %
Lab: ABNORMAL
MCH RBC QN AUTO: 32.3 PG (ref 26.6–33)
MCHC RBC AUTO-ENTMCNC: 31.7 G/DL (ref 31.5–35.7)
MCV RBC AUTO: 102 FL (ref 79–97)
METHGB BLD QL: 0.2 % (ref 0–3)
MODALITY: ABNORMAL
NT-PROBNP SERPL-MCNC: 332.6 PG/ML (ref 0–900)
OXYHGB MFR BLDV: 91.3 % (ref 94–99)
PCO2 BLDA: 28.9 MM HG (ref 35–45)
PCO2 TEMP ADJ BLD: ABNORMAL MM[HG]
PH BLDA: 7.44 PH UNITS (ref 7.35–7.45)
PH, TEMP CORRECTED: ABNORMAL
PLATELET # BLD AUTO: 320 10*3/MM3 (ref 140–450)
PMV BLD AUTO: 9.9 FL (ref 6–12)
PO2 BLDA: 63 MM HG (ref 83–108)
PO2 TEMP ADJ BLD: ABNORMAL MM[HG]
PROCALCITONIN SERPL-MCNC: 0.16 NG/ML (ref 0–0.25)
RBC # BLD AUTO: 3.96 10*6/MM3 (ref 4.14–5.8)
REF LAB TEST METHOD: NORMAL
SAO2 % BLDCOA: 92.7 % (ref 94–99)
VENTILATOR MODE: ABNORMAL
WBC NRBC COR # BLD AUTO: 17.34 10*3/MM3 (ref 3.4–10.8)

## 2024-08-07 PROCEDURE — 74022 RADEX COMPL AQT ABD SERIES: CPT

## 2024-08-07 PROCEDURE — 25010000002 CEFTRIAXONE PER 250 MG: Performed by: INTERNAL MEDICINE

## 2024-08-07 PROCEDURE — 83880 ASSAY OF NATRIURETIC PEPTIDE: CPT

## 2024-08-07 PROCEDURE — 74022 RADEX COMPL AQT ABD SERIES: CPT | Performed by: RADIOLOGY

## 2024-08-07 PROCEDURE — 82375 ASSAY CARBOXYHB QUANT: CPT

## 2024-08-07 PROCEDURE — 25810000003 LACTATED RINGERS PER 1000 ML: Performed by: SURGERY

## 2024-08-07 PROCEDURE — 25010000002 KETOROLAC TROMETHAMINE PER 15 MG: Performed by: SURGERY

## 2024-08-07 PROCEDURE — 94761 N-INVAS EAR/PLS OXIMETRY MLT: CPT

## 2024-08-07 PROCEDURE — 82805 BLOOD GASES W/O2 SATURATION: CPT

## 2024-08-07 PROCEDURE — 87070 CULTURE OTHR SPECIMN AEROBIC: CPT | Performed by: INTERNAL MEDICINE

## 2024-08-07 PROCEDURE — 25010000002 HYDROMORPHONE PER 4 MG: Performed by: SURGERY

## 2024-08-07 PROCEDURE — 83050 HGB METHEMOGLOBIN QUAN: CPT

## 2024-08-07 PROCEDURE — 25010000002 HEPARIN (PORCINE) PER 1000 UNITS: Performed by: SURGERY

## 2024-08-07 PROCEDURE — 84145 PROCALCITONIN (PCT): CPT

## 2024-08-07 PROCEDURE — 99222 1ST HOSP IP/OBS MODERATE 55: CPT

## 2024-08-07 PROCEDURE — 94799 UNLISTED PULMONARY SVC/PX: CPT

## 2024-08-07 PROCEDURE — 85027 COMPLETE CBC AUTOMATED: CPT | Performed by: SURGERY

## 2024-08-07 PROCEDURE — 87205 SMEAR GRAM STAIN: CPT | Performed by: INTERNAL MEDICINE

## 2024-08-07 PROCEDURE — 36600 WITHDRAWAL OF ARTERIAL BLOOD: CPT

## 2024-08-07 PROCEDURE — 99024 POSTOP FOLLOW-UP VISIT: CPT | Performed by: SURGERY

## 2024-08-07 PROCEDURE — 86140 C-REACTIVE PROTEIN: CPT | Performed by: SURGERY

## 2024-08-07 PROCEDURE — 94664 DEMO&/EVAL PT USE INHALER: CPT

## 2024-08-07 PROCEDURE — 25010000002 METHYLPREDNISOLONE PER 40 MG: Performed by: INTERNAL MEDICINE

## 2024-08-07 RX ORDER — METHYLPREDNISOLONE SODIUM SUCCINATE 40 MG/ML
40 INJECTION, POWDER, LYOPHILIZED, FOR SOLUTION INTRAMUSCULAR; INTRAVENOUS EVERY 12 HOURS
Status: DISCONTINUED | OUTPATIENT
Start: 2024-08-07 | End: 2024-08-09 | Stop reason: HOSPADM

## 2024-08-07 RX ADMIN — Medication 10 ML: at 08:23

## 2024-08-07 RX ADMIN — ACETAMINOPHEN 1000 MG: 500 TABLET ORAL at 16:39

## 2024-08-07 RX ADMIN — SODIUM CHLORIDE, POTASSIUM CHLORIDE, SODIUM LACTATE AND CALCIUM CHLORIDE 50 ML/HR: 600; 310; 30; 20 INJECTION, SOLUTION INTRAVENOUS at 05:40

## 2024-08-07 RX ADMIN — CEFTRIAXONE 1000 MG: 1 INJECTION, POWDER, FOR SOLUTION INTRAMUSCULAR; INTRAVENOUS at 17:23

## 2024-08-07 RX ADMIN — KETOROLAC TROMETHAMINE 15 MG: 30 INJECTION, SOLUTION INTRAMUSCULAR; INTRAVENOUS at 10:57

## 2024-08-07 RX ADMIN — HEPARIN SODIUM 5000 UNITS: 5000 INJECTION INTRAVENOUS; SUBCUTANEOUS at 21:02

## 2024-08-07 RX ADMIN — METOPROLOL TARTRATE 25 MG: 25 TABLET, FILM COATED ORAL at 08:23

## 2024-08-07 RX ADMIN — METOPROLOL TARTRATE 25 MG: 25 TABLET, FILM COATED ORAL at 21:02

## 2024-08-07 RX ADMIN — METHOCARBAMOL 750 MG: 750 TABLET, FILM COATED ORAL at 14:11

## 2024-08-07 RX ADMIN — METHOCARBAMOL 750 MG: 750 TABLET, FILM COATED ORAL at 17:44

## 2024-08-07 RX ADMIN — HYDRALAZINE HYDROCHLORIDE 50 MG: 50 TABLET ORAL at 08:23

## 2024-08-07 RX ADMIN — SERTRALINE 100 MG: 50 TABLET, FILM COATED ORAL at 08:22

## 2024-08-07 RX ADMIN — METHYLPREDNISOLONE SODIUM SUCCINATE 40 MG: 40 INJECTION, POWDER, FOR SOLUTION INTRAMUSCULAR; INTRAVENOUS at 16:38

## 2024-08-07 RX ADMIN — IPRATROPIUM BROMIDE AND ALBUTEROL SULFATE 3 ML: .5; 3 SOLUTION RESPIRATORY (INHALATION) at 08:10

## 2024-08-07 RX ADMIN — ACETAMINOPHEN 1000 MG: 500 TABLET ORAL at 10:57

## 2024-08-07 RX ADMIN — SENNOSIDES AND DOCUSATE SODIUM 2 TABLET: 50; 8.6 TABLET ORAL at 08:21

## 2024-08-07 RX ADMIN — SENNOSIDES AND DOCUSATE SODIUM 2 TABLET: 50; 8.6 TABLET ORAL at 21:04

## 2024-08-07 RX ADMIN — HYDROMORPHONE HYDROCHLORIDE 0.5 MG: 1 INJECTION, SOLUTION INTRAMUSCULAR; INTRAVENOUS; SUBCUTANEOUS at 14:11

## 2024-08-07 RX ADMIN — ACETAMINOPHEN 1000 MG: 500 TABLET ORAL at 04:48

## 2024-08-07 RX ADMIN — HYDRALAZINE HYDROCHLORIDE 50 MG: 50 TABLET ORAL at 21:02

## 2024-08-07 RX ADMIN — IPRATROPIUM BROMIDE AND ALBUTEROL SULFATE 3 ML: .5; 3 SOLUTION RESPIRATORY (INHALATION) at 00:16

## 2024-08-07 RX ADMIN — IPRATROPIUM BROMIDE AND ALBUTEROL SULFATE 3 ML: .5; 3 SOLUTION RESPIRATORY (INHALATION) at 18:35

## 2024-08-07 RX ADMIN — DOXYCYCLINE 100 MG: 100 INJECTION, POWDER, LYOPHILIZED, FOR SOLUTION INTRAVENOUS at 17:24

## 2024-08-07 RX ADMIN — METHOCARBAMOL 750 MG: 750 TABLET, FILM COATED ORAL at 08:22

## 2024-08-07 RX ADMIN — HEPARIN SODIUM 5000 UNITS: 5000 INJECTION INTRAVENOUS; SUBCUTANEOUS at 05:35

## 2024-08-07 RX ADMIN — HYDRALAZINE HYDROCHLORIDE 50 MG: 50 TABLET ORAL at 16:39

## 2024-08-07 RX ADMIN — IPRATROPIUM BROMIDE AND ALBUTEROL SULFATE 3 ML: .5; 3 SOLUTION RESPIRATORY (INHALATION) at 13:50

## 2024-08-07 RX ADMIN — HEPARIN SODIUM 5000 UNITS: 5000 INJECTION INTRAVENOUS; SUBCUTANEOUS at 14:11

## 2024-08-07 RX ADMIN — Medication 10 ML: at 21:02

## 2024-08-07 RX ADMIN — AMLODIPINE BESYLATE 10 MG: 10 TABLET ORAL at 08:22

## 2024-08-07 RX ADMIN — METHOCARBAMOL 750 MG: 750 TABLET, FILM COATED ORAL at 21:02

## 2024-08-07 RX ADMIN — ACETAMINOPHEN 1000 MG: 500 TABLET ORAL at 21:02

## 2024-08-07 NOTE — CONSULTS
[facility]  CONSULT NOTE     Inpatient Hospitalist Consult  Consult performed by: Nick Morrell PA-C  Consult ordered by: Boogie Hays MD        Patient Identification:  Name:  Celestine Arriaga  Age:  58 y.o.  Sex:  male  :  1965  MRN:  [id number]  Visit Number:  [id number]  Primary care provider:  Rachell Rivers APRN    Length of stay:  5    Subjective     Chief Complaint:  No chief complaint on file.      History of presenting illness:     Celestine Arriaga is a 58 y.o. male admitted by general surgery secondary to colovesical fistula.  He had sigmoid colon resection, appendectomy and cystoscopy with ureteral stent placement.  PMHx significant for CHF, COPD, CAD, diabetes mellitus, hyperlipidemia, GERD, hypertension     Hospital medicine was consulted for cough, COPD.    Most recent vital signs temp 97.9, heart rate 64, respirations 19, /96, SpO2 96% on 4 L per nasal cannula.  A.m. labs show CRP decreased from previous, white blood cell count is up from 15.6-17.34 overnight.  ABG and CXR pending.    Patient was seen and examined on 3N sitting up in bedside chair. He appeared comfortable on 4L nc. He reports shortness of breath persisting since waking up from anesthesia but maybe somewhat improved over the last day or so. He does have a productive cough- clear sputum. No subjective fevers. No reported nasal congestion or rhinorrhea. He is a smoker with diagnosis of COPD but does not require supplemental O2 at baseline. Chart does also list CHF, EF in 10/23 on chart review was 50-55%. No overt swelling that he has noticed or on exam. He reports tolerating diet OK currently with occasional early AM nausea.      ---------------------------------------------------------------------------------------------------------------------  Review of Systems   Constitutional:  Negative for chills and fever.   HENT:  Negative for congestion and rhinorrhea.     Respiratory:  Positive for cough and shortness of breath.    Cardiovascular:  Negative for chest pain and leg swelling.   Gastrointestinal:  Positive for nausea.   Musculoskeletal:  Negative for arthralgias and myalgias.   Skin:  Negative for rash and wound.   Neurological:  Negative for dizziness and light-headedness.          ---------------------------------------------------------------------------------------------------------------------   Past History:  Past Medical History:   Diagnosis Date    Arthritis     Chest pain     CHF (congestive heart failure)     COPD (chronic obstructive pulmonary disease)     Coronary artery disease     Diabetes mellitus     Elevated cholesterol     GERD (gastroesophageal reflux disease)     [place] (hard of hearing)     Hypertension     MI (myocardial infarction)     cardiac dr told him he had had a mild attack    Neuropathy     LOWER LEGS/FEET    Urinary tract infection      Past Surgical History:   Procedure Laterality Date    APPENDECTOMY N/A 8/2/2024    Procedure: APPENDECTOMY;  Surgeon: Boogie Hays MD;  Location: [facility];  Service: General;  Laterality: N/A;    BACK SURGERY      CARDIAC CATHETERIZATION      CARDIAC SURGERY      COLON RESECTION N/A 8/2/2024    Procedure: COLON RESECTION SIGMOID;  Surgeon: Boogie Hays MD;  Location: [facility];  Service: General;  Laterality: N/A;    COLONOSCOPY      COLONOSCOPY N/A 07/18/2024    Procedure: COLONOSCOPY;  Surgeon: Boogie Hays MD;  Location: [facility] OR;  Service: Gastroenterology;  Laterality: N/A;    COLOSTOMY N/A 8/2/2024    Procedure: COLOSTOMY LOOP;  Surgeon: Boogie Hays MD;  Location: [facility] OR;  Service: General;  Laterality: N/A;    CORONARY ANGIOPLASTY WITH STENT PLACEMENT      3 STENTS TOTAL    CYSTOSCOPY W/ URETERAL STENT PLACEMENT Bilateral 8/2/2024    Procedure: URETEROSCOPY URETERAL CATHETER/STENT INSERTION;  Surgeon: Boogie Hays MD;  Location: [facility] OR;  Service: General;  Laterality:  Bilateral;    EYE SURGERY Right     TOE AMPUTATION Left     great toe in  accident     Family History   Problem Relation Age of Onset    No Known Problems Father     Cancer Mother      Social History     Socioeconomic History    Marital status:    Tobacco Use    Smoking status: Every Day     Current packs/day: 1.50     Average packs/day: 1.5 packs/day for 40.6 years (60.9 ttl pk-yrs)     Types: Cigarettes     Start date: 1984     Passive exposure: Current    Smokeless tobacco: Former     Types: Chew   Vaping Use    Vaping status: Never Used   Substance and Sexual Activity    Alcohol use: Not Currently     Alcohol/week: 2.0 standard drinks of alcohol     Types: 2 Shots of liquor per week     Comment: 2 mixed drinks a night    Drug use: Never    Sexual activity: Defer     Birth control/protection: None     ---------------------------------------------------------------------------------------------------------------------   Allergies:  Lisinopril  ---------------------------------------------------------------------------------------------------------------------   Prior to Admission Medications       Prescriptions Last Dose Informant Patient Reported? Taking?    acetaminophen (TYLENOL) 500 MG tablet 8/1/2024 Self, Other Yes Yes    Take 1 tablet by mouth Every 6 (Six) Hours As Needed for Mild Pain.    albuterol sulfate  (90 Base) MCG/ACT inhaler 8/1/2024 Self, Other Yes Yes    Inhale 1 puff Every 6 (Six) Hours As Needed for Wheezing.    amLODIPine (NORVASC) 10 MG tablet 8/2/2024 Self, Other Yes Yes    Take 1 tablet by mouth Daily.    aspirin 81 MG EC tablet 8/1/2024 Self, Other Yes Yes    Take 1 tablet by mouth Daily.    hydrALAZINE (APRESOLINE) 50 MG tablet 8/1/2024 Self, Other Yes Yes    Take 1 tablet by mouth 3 (Three) Times a Day.    metoprolol tartrate (LOPRESSOR) 25 MG tablet 8/2/2024 Self, Other Yes Yes    Take 1 tablet by mouth 2 (Two) Times a Day.    nitroglycerin (NITROSTAT) 0.4 MG  SL tablet Unknown Self, Other Yes No    Place 1 tablet under the tongue Every 5 (Five) Minutes As Needed for Chest Pain. Take no more than 3 doses in 15 minutes.    Ozempic, 0.25 or 0.5 MG/DOSE, 2 MG/3ML solution pen-injector 7/11/2024 Self, Other Yes No    INJECT 0.25 MG  SUBCUTANEOUSLY ONCE A WEEK    sertraline (ZOLOFT) 100 MG tablet 8/2/2024 Self, Other Yes Yes    Take 1 tablet by mouth Daily.          ---------------------------------------------------------------------------------------------------------------------     Objective      Procedures:    Hospital Meds:  acetaminophen, 1,000 mg, Oral, Q6H  amLODIPine, 10 mg, Oral, Daily  heparin (porcine), 5,000 Units, Subcutaneous, Q8H  hydrALAZINE, 50 mg, Oral, TID  ipratropium-albuterol, 3 mL, Nebulization, Q6H - RT  methocarbamol, 750 mg, Oral, 4x Daily  metoprolol tartrate, 25 mg, Oral, BID  senna-docusate sodium, 2 tablet, Oral, BID  sertraline, 100 mg, Oral, Daily  sodium chloride, 10 mL, Intravenous, Q12H      lactated ringers, 50 mL/hr, Last Rate: 50 mL/hr (08/07/24 0540)      ---------------------------------------------------------------------------------------------------------------------   Vital Signs:  Temp:  [97.5 °F (36.4 °C)-98.9 °F (37.2 °C)] 97.9 °F (36.6 °C)  Heart Rate:  [59-77] 64  Resp:  [19-22] 19  BP: (145-175)/(84-99) 165/96  Mean Arterial Pressure (Non-Invasive) for the past 24 hrs (Last 3 readings):   Noninvasive MAP (mmHg)   08/06/24 1128 123     SpO2 Percentage    08/07/24 0025 08/07/24 0600 08/07/24 0810   SpO2: 97% 94% 96%     SpO2:  [92 %-97 %] 96 %  on  Flow (L/min):  [4] 4;   Device (Oxygen Therapy): nasal cannula    Body mass index is 36.57 kg/m².  Wt Readings from Last 3 Encounters:   08/02/24 116 kg (254 lb 14.4 oz)   07/25/24 118 kg (260 lb)   07/18/24 118 kg (260 lb)        Intake/Output Summary (Last 24 hours) at 8/7/2024 0948  Last data filed at 8/7/2024 0540  Gross per 24 hour   Intake 1821.34 ml   Output 2345 ml   Net  -523.66 ml     Diet: Gastrointestinal; Fiber-Restricted; Texture: Soft to Chew (NDD 3); Soft to Chew: Whole Meat; Fluid Consistency: Thin (IDDSI 0)  ---------------------------------------------------------------------------------------------------------------------   Physical exam:  Physical Exam  Vitals and nursing note reviewed.   Constitutional:       General: He is not in acute distress.  HENT:      Head: Normocephalic and atraumatic.   Eyes:      Extraocular Movements: Extraocular movements intact.   Cardiovascular:      Rate and Rhythm: Normal rate and regular rhythm.   Pulmonary:      Effort: Pulmonary effort is normal.      Breath sounds: Rhonchi (wet) present.   Musculoskeletal:      Right lower leg: No edema.      Left lower leg: No edema.   Skin:     General: Skin is warm and dry.   Neurological:      Mental Status: He is alert. Mental status is at baseline.   Psychiatric:         Mood and Affect: Mood normal.         Behavior: Behavior normal.       ---------------------------------------------------------------------------------------------------------------------   EKG:     ---------------------------------------------------------------------------------------------------------------------             Results from last 7 days   Lab Units 08/07/24  0113 08/06/24  0320 08/05/24  0235   CRP mg/dL 13.52* 19.34* 26.37*   WBC 10*3/mm3 17.34* 15.60* 17.32*   HEMOGLOBIN g/dL 12.8* 12.5* 11.6*   HEMATOCRIT % 40.4 38.5 36.0*   MCV fL 102.0* 99.0* 101.4*   MCHC g/dL 31.7 32.5 32.2   PLATELETS 10*3/mm3 320 297 256     Results from last 7 days   Lab Units 08/05/24  0235 08/04/24  0032 08/03/24  0218   SODIUM mmol/L 137 135* 139   POTASSIUM mmol/L 4.0 3.9 4.5   MAGNESIUM mg/dL  --  1.8  --    CHLORIDE mmol/L 107 102 104   CO2 mmol/L 20.6* 21.2* 22.1   BUN mg/dL 10 16 18   CREATININE mg/dL 0.82 1.10 1.26   CALCIUM mg/dL 9.2 8.4* 8.6   GLUCOSE mg/dL 120* 112* 132*   Estimated Creatinine Clearance: 128.3 mL/min (by  "C-G formula based on SCr of 0.82 mg/dL).  No results found for: \"AMMONIA\"    No results found for: \"HGBA1C\", \"POCGLU\"  Lab Results   Component Value Date    HGBA1C 5.4 11/22/2015     No results found for: \"TSH\", \"FREET4\"    No results found for: \"BLOODCX\"  No results found for: \"URINECX\"  No results found for: \"WOUNDCX\"  No results found for: \"STOOLCX\"  No results found for: \"RESPCX\"  Pain Management Panel           No data to display              I have personally reviewed the above laboratory results.   ---------------------------------------------------------------------------------------------------------------------  Imaging Results (Last 7 Days)       Procedure Component Value Units Date/Time    XR Chest 1 View [[id number]] Collected: 08/03/24 1947     Updated: 08/03/24 1950    Narrative:      Comparison: None     Findings:  1.  No consolidation  2.  No pulmonary edema, pleural effusion or pneumothorax       Impression:      Impression:  No acute process.        This report was finalized on 8/3/2024 7:48 PM by Ibrahima Trujillo DO.       FL Surgery Fluoro [[id number]] Collected: 08/02/24 1339     Updated: 08/02/24 1342    Narrative:      EXAMINATION: [place] SURGERY FLUORO-      CLINICAL INDICATION: ureatal stents        COMPARISON: None available     Total fluoroscopy time 15.8 seconds     Fluoroscopy was provided and 4 images were acquired's stent was placed     For a full procedure report, please see the report provided by the  performing physician        This report was finalized on 8/2/2024 1:40 PM by Dr. Kenneth Jones MD.             I have personally reviewed the above radiology results.   ----------------------------------------------------------------------------------------------------------------------    Assessment/Plan     Shortness of breath  Cough  In the setting of underlying COPD  History of CHF with preserved EF  Medicine consulted for cough, O2 requirement greater than baseline and shortness of " breath in the setting of COPD. Patient reports shortness of breath has been persistent post operatively and he is complaining of cough productive of clear sputum.  WBC trend up overnight from 15 to 17k. Had received cefoxitin perioperatively, stopped after 8/4  O2 sats currently mid 90s on 4L. Baseline is RA.   ABG and CXR pending  Will further assess with procal, BNP.   Add BMP for renal function should he require diuresis.   Hold further fluids for now  Continue scheduled duo-neb  We will follow up results and treat as indicated    Colovesical fistula s/p sigmoid resection, colostomy, and ureteral stent placement  Continue management per primary    CAD   Diabetes mellitus   HLD  HTN  Home regimen continued per primary.   BP trend stable, slightly above goal. Will monitor closely.   Glucose trend okay. Would add SSI if needed.    Thank you for the consult and allowing us to participate in the care of your patient, hospital medicine will continue to follow. Please do not hesitate to call with any questions or concerns.      Nick Morrell PA-C  08/07/24  09:48 EDT    Attestation: I personally discussed the patient's history of presenting illness, physical examination, assessment, and plan with my attending physician, Dr. Dunn.

## 2024-08-07 NOTE — PLAN OF CARE
Goal Outcome Evaluation:                Pt resting in bed, VSS. Pt c/o pain, PRN medication given per MAR. Pt got up to bedside chair this shift. Pt voices no concerns at this time, will continue with POC.

## 2024-08-07 NOTE — PROGRESS NOTES
POD#5 colon resection, colostomy    He is a obese 59 yo smoker who had a difficult sigmoid resection for colovesical fistula. So far, he has progressed well with diet and colostomy is working. His drain output is low and is serous. [place] is in and will be removed likely outpatient. He has ureteral stents in as well. He had used O2 at home in the past some but not recently. He has had a cough and WBC did go up slightly again since yesterday, where it had been slowly coming down. CRP is still going down. He does have some rattling and coughing as well as looking a little more short of breath. Antibiotics were stopped a couple of days ago. A CXR and ABGs will be checked.

## 2024-08-07 NOTE — PLAN OF CARE
Goal Outcome Evaluation:  Plan of Care Reviewed With: patient        Progress: no change  Outcome Evaluation: Pt resting in bed during assessment. Pt has sat on side of bed during this shift. REBECCA stripped and emptied. Pt C/O pain, PRN meds given per order. Pt has no other complaints or concerns at this time. Will cont POC

## 2024-08-07 NOTE — NURSING NOTE
Patient with new ostomy to RUQ.  2-piece 70mm Chidi appliance in use. Stoma round, moist and darkly discolored.  Stoma bridge in place.  Chiquita stomal skin is pink and intact.  Discussed ostomy function, home care, and obtaining supplies for home. All questions answered and patient voiced understanding.  Will follow up tomorrow.

## 2024-08-08 LAB
ANION GAP SERPL CALCULATED.3IONS-SCNC: 12.9 MMOL/L (ref 5–15)
BASOPHILS # BLD AUTO: 0.05 10*3/MM3 (ref 0–0.2)
BASOPHILS NFR BLD AUTO: 0.3 % (ref 0–1.5)
BUN SERPL-MCNC: 16 MG/DL (ref 6–20)
BUN/CREAT SERPL: 18.8 (ref 7–25)
CALCIUM SPEC-SCNC: 9.2 MG/DL (ref 8.6–10.5)
CHLORIDE SERPL-SCNC: 106 MMOL/L (ref 98–107)
CO2 SERPL-SCNC: 17.1 MMOL/L (ref 22–29)
CREAT SERPL-MCNC: 0.85 MG/DL (ref 0.76–1.27)
CRP SERPL-MCNC: 10 MG/DL (ref 0–0.5)
DEPRECATED RDW RBC AUTO: 54.9 FL (ref 37–54)
EGFRCR SERPLBLD CKD-EPI 2021: 102 ML/MIN/1.73
EOSINOPHIL # BLD AUTO: 0.01 10*3/MM3 (ref 0–0.4)
EOSINOPHIL NFR BLD AUTO: 0.1 % (ref 0.3–6.2)
ERYTHROCYTE [DISTWIDTH] IN BLOOD BY AUTOMATED COUNT: 14.8 % (ref 12.3–15.4)
GLUCOSE SERPL-MCNC: 175 MG/DL (ref 65–99)
HCT VFR BLD AUTO: 39.5 % (ref 37.5–51)
HGB BLD-MCNC: 13.1 G/DL (ref 13–17.7)
IMM GRANULOCYTES # BLD AUTO: 0.16 10*3/MM3 (ref 0–0.05)
IMM GRANULOCYTES NFR BLD AUTO: 1 % (ref 0–0.5)
LYMPHOCYTES # BLD AUTO: 1.39 10*3/MM3 (ref 0.7–3.1)
LYMPHOCYTES NFR BLD AUTO: 9 % (ref 19.6–45.3)
MCH RBC QN AUTO: 33.1 PG (ref 26.6–33)
MCHC RBC AUTO-ENTMCNC: 33.2 G/DL (ref 31.5–35.7)
MCV RBC AUTO: 99.7 FL (ref 79–97)
MONOCYTES # BLD AUTO: 0.42 10*3/MM3 (ref 0.1–0.9)
MONOCYTES NFR BLD AUTO: 2.7 % (ref 5–12)
NEUTROPHILS NFR BLD AUTO: 13.33 10*3/MM3 (ref 1.7–7)
NEUTROPHILS NFR BLD AUTO: 86.9 % (ref 42.7–76)
NRBC BLD AUTO-RTO: 0 /100 WBC (ref 0–0.2)
PLATELET # BLD AUTO: 393 10*3/MM3 (ref 140–450)
PMV BLD AUTO: 10.7 FL (ref 6–12)
POTASSIUM SERPL-SCNC: 4.3 MMOL/L (ref 3.5–5.2)
RBC # BLD AUTO: 3.96 10*6/MM3 (ref 4.14–5.8)
SODIUM SERPL-SCNC: 136 MMOL/L (ref 136–145)
WBC NRBC COR # BLD AUTO: 15.36 10*3/MM3 (ref 3.4–10.8)

## 2024-08-08 PROCEDURE — 86140 C-REACTIVE PROTEIN: CPT | Performed by: SURGERY

## 2024-08-08 PROCEDURE — 94760 N-INVAS EAR/PLS OXIMETRY 1: CPT

## 2024-08-08 PROCEDURE — 94799 UNLISTED PULMONARY SVC/PX: CPT

## 2024-08-08 PROCEDURE — 25010000002 METHYLPREDNISOLONE PER 40 MG: Performed by: INTERNAL MEDICINE

## 2024-08-08 PROCEDURE — 85025 COMPLETE CBC W/AUTO DIFF WBC: CPT

## 2024-08-08 PROCEDURE — 80048 BASIC METABOLIC PNL TOTAL CA: CPT

## 2024-08-08 PROCEDURE — 25010000002 HEPARIN (PORCINE) PER 1000 UNITS: Performed by: SURGERY

## 2024-08-08 PROCEDURE — 25010000002 CEFTRIAXONE PER 250 MG: Performed by: INTERNAL MEDICINE

## 2024-08-08 PROCEDURE — 94664 DEMO&/EVAL PT USE INHALER: CPT

## 2024-08-08 PROCEDURE — 99232 SBSQ HOSP IP/OBS MODERATE 35: CPT

## 2024-08-08 PROCEDURE — 99024 POSTOP FOLLOW-UP VISIT: CPT | Performed by: SURGERY

## 2024-08-08 RX ORDER — DOXYCYCLINE 100 MG/1
100 CAPSULE ORAL 2 TIMES DAILY
Qty: 6 CAPSULE | Refills: 0 | Status: SHIPPED | OUTPATIENT
Start: 2024-08-08 | End: 2024-08-12

## 2024-08-08 RX ORDER — PREDNISONE 20 MG/1
40 TABLET ORAL DAILY
Qty: 6 TABLET | Refills: 0 | Status: SHIPPED | OUTPATIENT
Start: 2024-08-08 | End: 2024-08-12

## 2024-08-08 RX ORDER — OXYCODONE AND ACETAMINOPHEN 5; 325 MG/1; MG/1
1 TABLET ORAL EVERY 6 HOURS PRN
Qty: 15 TABLET | Refills: 0 | Status: SHIPPED | OUTPATIENT
Start: 2024-08-08

## 2024-08-08 RX ORDER — CEFDINIR 300 MG/1
300 CAPSULE ORAL 2 TIMES DAILY
Qty: 6 CAPSULE | Refills: 0 | Status: SHIPPED | OUTPATIENT
Start: 2024-08-08 | End: 2024-08-12

## 2024-08-08 RX ADMIN — METHOCARBAMOL 750 MG: 750 TABLET, FILM COATED ORAL at 21:29

## 2024-08-08 RX ADMIN — IPRATROPIUM BROMIDE AND ALBUTEROL SULFATE 3 ML: .5; 3 SOLUTION RESPIRATORY (INHALATION) at 00:30

## 2024-08-08 RX ADMIN — METHOCARBAMOL 750 MG: 750 TABLET, FILM COATED ORAL at 17:44

## 2024-08-08 RX ADMIN — AMLODIPINE BESYLATE 10 MG: 10 TABLET ORAL at 08:13

## 2024-08-08 RX ADMIN — IPRATROPIUM BROMIDE AND ALBUTEROL SULFATE 3 ML: .5; 3 SOLUTION RESPIRATORY (INHALATION) at 14:10

## 2024-08-08 RX ADMIN — IPRATROPIUM BROMIDE AND ALBUTEROL SULFATE 3 ML: .5; 3 SOLUTION RESPIRATORY (INHALATION) at 07:24

## 2024-08-08 RX ADMIN — HYDRALAZINE HYDROCHLORIDE 50 MG: 50 TABLET ORAL at 15:28

## 2024-08-08 RX ADMIN — DOXYCYCLINE 100 MG: 100 INJECTION, POWDER, LYOPHILIZED, FOR SOLUTION INTRAVENOUS at 05:36

## 2024-08-08 RX ADMIN — METHYLPREDNISOLONE SODIUM SUCCINATE 40 MG: 40 INJECTION, POWDER, FOR SOLUTION INTRAMUSCULAR; INTRAVENOUS at 15:28

## 2024-08-08 RX ADMIN — ACETAMINOPHEN 1000 MG: 500 TABLET ORAL at 21:29

## 2024-08-08 RX ADMIN — IPRATROPIUM BROMIDE AND ALBUTEROL SULFATE 3 ML: .5; 3 SOLUTION RESPIRATORY (INHALATION) at 19:34

## 2024-08-08 RX ADMIN — METHYLPREDNISOLONE SODIUM SUCCINATE 40 MG: 40 INJECTION, POWDER, FOR SOLUTION INTRAMUSCULAR; INTRAVENOUS at 03:19

## 2024-08-08 RX ADMIN — HEPARIN SODIUM 5000 UNITS: 5000 INJECTION INTRAVENOUS; SUBCUTANEOUS at 21:29

## 2024-08-08 RX ADMIN — METOPROLOL TARTRATE 25 MG: 25 TABLET, FILM COATED ORAL at 08:12

## 2024-08-08 RX ADMIN — SENNOSIDES AND DOCUSATE SODIUM 2 TABLET: 50; 8.6 TABLET ORAL at 08:13

## 2024-08-08 RX ADMIN — ACETAMINOPHEN 1000 MG: 500 TABLET ORAL at 03:19

## 2024-08-08 RX ADMIN — SENNOSIDES AND DOCUSATE SODIUM 2 TABLET: 50; 8.6 TABLET ORAL at 21:29

## 2024-08-08 RX ADMIN — CEFTRIAXONE 1000 MG: 1 INJECTION, POWDER, FOR SOLUTION INTRAMUSCULAR; INTRAVENOUS at 15:32

## 2024-08-08 RX ADMIN — METOPROLOL TARTRATE 25 MG: 25 TABLET, FILM COATED ORAL at 21:29

## 2024-08-08 RX ADMIN — ACETAMINOPHEN 1000 MG: 500 TABLET ORAL at 12:40

## 2024-08-08 RX ADMIN — METHOCARBAMOL 750 MG: 750 TABLET, FILM COATED ORAL at 12:40

## 2024-08-08 RX ADMIN — HEPARIN SODIUM 5000 UNITS: 5000 INJECTION INTRAVENOUS; SUBCUTANEOUS at 13:53

## 2024-08-08 RX ADMIN — HEPARIN SODIUM 5000 UNITS: 5000 INJECTION INTRAVENOUS; SUBCUTANEOUS at 05:36

## 2024-08-08 RX ADMIN — Medication 10 ML: at 21:29

## 2024-08-08 RX ADMIN — METHOCARBAMOL 750 MG: 750 TABLET, FILM COATED ORAL at 08:12

## 2024-08-08 RX ADMIN — DOXYCYCLINE 100 MG: 100 INJECTION, POWDER, LYOPHILIZED, FOR SOLUTION INTRAVENOUS at 17:46

## 2024-08-08 RX ADMIN — Medication 10 ML: at 08:11

## 2024-08-08 RX ADMIN — SERTRALINE 100 MG: 50 TABLET, FILM COATED ORAL at 08:12

## 2024-08-08 RX ADMIN — HYDRALAZINE HYDROCHLORIDE 50 MG: 50 TABLET ORAL at 08:12

## 2024-08-08 RX ADMIN — HYDRALAZINE HYDROCHLORIDE 50 MG: 50 TABLET ORAL at 21:29

## 2024-08-08 RX ADMIN — ACETAMINOPHEN 1000 MG: 500 TABLET ORAL at 17:44

## 2024-08-08 NOTE — PAYOR COMM NOTE
"CLINICAL UPDATE. AWAITING APPROVAL OF ADDITIONAL DAYS.    Celestine Cook (58 y.o. Male)       Date of Birth   1965    Social Security Number       Address   [address] [address]    Home Phone       MRN   [id number]       Tenriism   None    Marital Status                               Admission Date   8/2/24    Admission Type   Elective    Admitting Provider   Boogie Hays MD    Attending Provider   Boogie Hays MD    Department, Room/Bed   [address], [address]/       Discharge Date       Discharge Disposition       Discharge Destination                                 Attending Provider: Boogie Hays MD    Allergies: Lisinopril    Isolation: None   Infection: None   Code Status: CPR    Ht: 177.8 cm (70\")   Wt: 116 kg (254 lb 14.4 oz)    Admission Cmt: None   Principal Problem: Colovesical fistula [N32.1]                   Active Insurance as of 8/2/2024       Primary Coverage       Payor Plan Insurance Group Employer/Plan Group    [employer] BLUE [employer]OSS [employer]UE [employer] BLUE [employer] PPO [id number]       Payor Plan Address Payor Plan Phone Number Payor Plan Fax Number Effective Dates    [address] [phone]  1/1/2024 - None Entered    [facility] [id number]         Subscriber Name Subscriber Birth Date Member ID       CELESTINE COOK 1965 [id number]                     Emergency Contacts        (Rel.) Home Phone Work Phone Mobile Phone    DANIELA COOK (Relative) [phone] -- --    Mike Campos (Relative) -- -- [phone]              Vital Signs (last 2 days)       Date/Time Temp Temp src Pulse Resp BP Patient Position SpO2    08/08/24 0812 -- -- 66 -- 159/86 -- --    08/08/24 0724 -- -- 61 18 -- -- 95    08/08/24 0649 97.4 (36.3) Oral 65 18 169/97 Lying --    08/08/24 0345 97.7 (36.5) Oral 60 20 175/90 Lying --    08/08/24 0040 -- -- 70 20 -- -- 95    08/08/24 0030 -- -- 77 20 -- -- 94    08/07/24 2337 -- -- 65 -- 165/88 -- --    " 08/07/24 2240 98 (36.7) Oral 69 20 170/96  Lying --    BP: Rn Shanta Aware. at 08/07/24 2240    08/07/24 1930 97.5 (36.4) Oral 65 20 168/92  Lying 93    BP: Minerva alvarado Aware. at 08/07/24 1930    08/07/24 1845 -- -- 68 20 -- -- 94    08/07/24 1835 -- -- 72 20 -- -- 92    08/07/24 1639 -- -- 64 -- 157/89 -- --    08/07/24 1411 -- -- 59 -- 157/91 -- --    08/07/24 1400 98 (36.7) Oral 60 20 166/92 Lying 96    08/07/24 1350 -- -- 65 18 -- -- 91    08/07/24 1000 98.5 (36.9) Oral 70 20 163/93 Sitting 98    08/07/24 0810 -- -- 64 19 -- -- 96    08/07/24 0600 97.9 (36.6) Oral 61 20 165/96 Lying 94    08/07/24 0437 -- -- -- -- 160/85 Lying --    08/07/24 0330 97.5 (36.4) Oral 63 20 175/95  Lying --    BP: Minerva Lopez Aware. at 08/07/24 0330    08/07/24 0025 -- -- 68 20 -- -- 97    08/07/24 0016 -- -- 59 20 -- -- 96    08/06/24 2347 98.3 (36.8) Oral 60 20 158/84  Lying --    BP: Minerva Pantoja Aware. at 08/06/24 2347    08/06/24 1914 98.9 (37.2) Oral 75 20 158/88 Lying --    08/06/24 1850 -- -- 77 20 -- -- 95    08/06/24 1838 -- -- 72 20 -- -- 94    08/06/24 1636 -- -- 73 -- 166/99 -- --    08/06/24 1403 -- -- 68 22 -- -- --    08/06/24 1400 97.9 (36.6) Axillary 64 20 145/85 Lying 95    08/06/24 1353 -- -- 63 21 -- -- 92    08/06/24 1128 -- -- 68 -- 158/93 Lying --    08/06/24 1000 98.4 (36.9) Oral 67 20 162/94 Lying 94    08/06/24 0930 -- -- 66 -- 154/88 -- --    08/06/24 0705 -- -- 65 20 -- -- --    08/06/24 0655 -- -- 63 18 -- -- 93    08/06/24 0500 97.7 (36.5) Oral 68 18 160/78 Lying 92    08/06/24 0332 98.8 (37.1) Oral 65 18 175/98  Lying --    BP: Minerva Raymond Aware. at 08/06/24 0332    08/06/24 0039 -- -- 68 18 -- -- 91    08/06/24 0028 -- -- 65 18 -- -- 91          Intake & Output (last 2 days)         08/06 0701  08/07 0700 08/07 0701  08/08 0700 08/08 0701  08/09 0700    P.O. 850 1080     I.V. (mL/kg) 1211.3 (10.4) 810 (7)     Total Intake(mL/kg) 2061.3 (17.8) 1890 (16.3)     Urine (mL/kg/hr) 2325 (0.8) 2600 (0.9)     Drains 20 10      Stool       Total Output 5089 0108     Net -832.7 -568                  Medication Administration Report for Celestine Arriaga as of 8/7/24 through 8/8/24     Legend:    Given Hold Not Given Due Canceled Entry Other Actions    Time Time (Time) Time Time-Action         Discontinued     Completed     Future     MAR Hold     Linked             Medications 08/07/24 08/08/24     acetaminophen (TYLENOL) tablet 1,000 mg  Dose: 1,000 mg  Freq: Every 6 Hours Route: PO  Start: 08/03/24 1000     Admin Instructions:   Based on patient request - if ordered for moderate or severe pain, provider allows for administration of a medication prescribed for a lower pain scale.    Do not exceed 4 grams of acetaminophen in a 24 hr period. Max dose of 2gm for AST/ALT greater than 120 units/L.    If given for pain, use the following pain scale:   Mild Pain = Pain Score of 1-3, CPOT 1-2  Moderate Pain = Pain Score of 4-6, CPOT 3-4  Severe Pain = Pain Score of 7-10, CPOT 5-8      0448-Given     1057-Given     1639-Given     2102-Given         0319-Given     1000     1600     2200           albuterol (PROVENTIL) nebulizer solution 0.083% 2.5 mg/3mL  Dose: 2.5 mg  Freq: Every 4 Hours PRN Route: NEBULIZATION  PRN Reasons: Shortness of Air,Wheezing  Start: 08/04/24 1042     Admin Instructions:   Include Respiratory Treatment Education          amLODIPine (NORVASC) tablet 10 mg  Dose: 10 mg  Freq: Daily Route: PO  Start: 08/03/24 0900     Admin Instructions:   Hold for SBP less than 100, DBP less than 60.  Caution: Look alike/sound alike drug alert. Avoid grapefruit juice.      0822-Given            0813-Given               sennosides-docusate (PERICOLACE) 8.6-50 MG per tablet 2 tablet  Dose: 2 tablet  Freq: 2 Times Daily Route: PO  Start: 08/02/24 2100     Admin Instructions:   HOLD MEDICATION IF PATIENT HAS HAD BOWEL MOVEMENT. Start bowel management regimen if patient has not had a bowel movement after 12 hours.      0821-Given      2104-Given           0813-Given     2100             And   polyethylene glycol (MIRALAX) packet 17 g  Dose: 17 g  Freq: Daily PRN Route: PO  PRN Reason: Constipation  PRN Comment: Use if senna-docusate is ineffective  Start: 08/02/24 1620     Admin Instructions:   Use if no bowel movement after 12 hours. Mix in 6-8 ounces of water.  Use 4-8 ounces of water, tea, or juice for each 17 gram dose.          And   bisacodyl (DULCOLAX) EC tablet 5 mg  Dose: 5 mg  Freq: Daily PRN Route: PO  PRN Reason: Constipation  PRN Comment: Use if polyethylene glycol is ineffective  Start: 08/02/24 1620     Admin Instructions:   Use if no bowel movement after 12 hours.  Swallow whole. Do not crush, split, or chew tablet.          And   bisacodyl (DULCOLAX) suppository 10 mg  Dose: 10 mg  Freq: Daily PRN Route: RE  PRN Reason: Constipation  PRN Comment: Use if bisacodyl oral is ineffective  Start: 08/02/24 1620 End: 08/03/24 0815     Admin Instructions:   Use if no bowel movement after 12 hours.  Hold for diarrhea          cefTRIAXone (ROCEPHIN) 1,000 mg in sodium chloride 0.9 % 100 mL IVPB-VTB  Dose: 1,000 mg  Freq: Every 24 Hours Route: IV  Indications of Use: UPPER RESPIRATORY TRACT INFECTION  Start: 08/07/24 1630 End: 08/12/24 1629     Admin Instructions:   LR should be paused and flushing of the line with NS is recommended prior to and after completion of ceftriaxone infusion due to incompatibility. Do not co-adminster with calcium-containing solutions.  Caution: Look alike/sound alike drug alert      1723-New Bag            1630              doxycycline (VIBRAMYCIN) 100 mg in sodium chloride 0.9 % 100 mL IVPB-VTB  Dose: 100 mg  Freq: Every 12 Hours Route: IV  Indications of Use: UPPER RESPIRATORY TRACT INFECTION  Start: 08/07/24 1700 End: 08/14/24 1659     Admin Instructions:   Protect from light.      1724-New Bag            0536-New Bag     1700             heparin (porcine) 5000 UNIT/ML injection 5,000 Units  Dose: 5,000  "Units  Freq: Every 8 Hours Scheduled Route: SC  Indications of Use: PROPHYLAXIS OF VENOUS THROMBOEMBOLISM  Start: 08/03/24 1400      0535-Given     1411-Given     2102-Given          0536-Given     1400     2200            hydrALAZINE (APRESOLINE) tablet 50 mg  Dose: 50 mg  Freq: 3 Times Daily Route: PO  Start: 08/02/24 1715     Admin Instructions:   Hold for SBP less than 100, DBP less than 60.  Caution: Look alike/sound alike drug alert      0823-Given     1639-Given     2102-Given          0812-Given     1600     2100            ipratropium-albuterol (DUO-NEB) nebulizer solution 3 mL  Dose: 3 mL  Freq: Every 6 Hours - RT Route: NEBULIZATION  Start: 08/04/24 1300     Admin Instructions:   Include Respiratory Treatment Education      0016-Given     0810-Given     1350-Given     1835-Given         0030-Given     0724-Given     1300     1900           lactated ringers infusion  Rate: 50 mL/hr Dose: 50 mL/hr  Freq: Continuous Route: IV  Start: 08/02/24 1715      0540-New Bag     1019-Held by provider     1058-Stopped             Magnesium Standard Dose Replacement - Follow Nurse / BPA Driven Protocol  Freq: As Needed Route: XX  PRN Reason: Other  Start: 08/03/24 0815     Admin Instructions:   Open Order & Select \"[facility] Electrolyte Replacement Protocol Algorithm\" to View Details          methocarbamol (ROBAXIN) tablet 750 mg  Dose: 750 mg  Freq: 4 Times Daily Route: PO  Start: 08/03/24 1200      0822-Given     1411-Given     1744-Given     2102-Given         0812-Given     1200     1800     2100           methylPREDNISolone sodium succinate (SOLU-Medrol) injection 40 mg  Dose: 40 mg  Freq: Every 12 Hours Route: IV  Start: 08/07/24 1615     Admin Instructions:   Caution: Look alike/sound alike drug alert      1638-Given            0319-Given     1615             metoprolol tartrate (LOPRESSOR) tablet 25 mg  Dose: 25 mg  Freq: 2 Times Daily Route: PO  Start: 08/02/24 2100     Admin Instructions:   Hold for SBP less than " "100, DBP less than 60, or heart rate less than 50. If a dose is held, please contact the provider.      0823-Given     2102-Given           0812-Given     2100             nitroglycerin (NITROSTAT) SL tablet 0.4 mg  Dose: 0.4 mg  Freq: Every 5 Minutes PRN Route: SL  PRN Reason: Chest Pain  Start: 08/02/24 1620     Admin Instructions:   Sublingual. Do not chew, crush, or swallow. Place under tongue and allow to dissolve. May take a small sip of water prior to placing under the tongue to aid dissolution.  May administer up to 3 doses per episode.          ondansetron (ZOFRAN) injection 4 mg  Dose: 4 mg  Freq: Every 6 Hours PRN Route: IV  PRN Reasons: Nausea,Vomiting  Start: 08/02/24 1620     Admin Instructions:   If BOTH ondansetron (ZOFRAN) and promethazine (PHENERGAN) are ordered use ondansetron first and THEN promethazine IF ondansetron is ineffective.          oxyCODONE (ROXICODONE) immediate release tablet 5 mg  Dose: 5 mg  Freq: Every 4 Hours PRN Route: PO  PRN Reason: Severe Pain  Start: 08/03/24 0814 End: 08/10/24 0813     Admin Instructions:       If given for pain, use the following pain scale:  Mild Pain = Pain Score of 1-3, CPOT 1-2  Moderate Pain = Pain Score of 4-6, CPOT 3-4  Severe Pain = Pain Score of 7-10, CPOT 5-8          Phosphorus Replacement - Follow Nurse / BPA Driven Protocol  Freq: As Needed Route: XX  PRN Reason: Other  Start: 08/03/24 0815     Admin Instructions:   Open Order & Select \"[facility] Electrolyte Replacement Protocol Algorithm\" to View Details          Potassium Replacement - Follow Nurse / BPA Driven Protocol  Freq: As Needed Route: XX  PRN Reason: Other  Start: 08/03/24 0815     Admin Instructions:   Open Order & Select \"[facility] Electrolyte Replacement Protocol Algorithm\" to View Details          sertraline (ZOLOFT) tablet 100 mg  Dose: 100 mg  Freq: Daily Route: PO  Start: 08/03/24 0900      0822-Given            0812-Given              sodium chloride 0.9 % flush 10 mL  Dose: 10 " mL  Freq: As Needed Route: IV  PRN Reason: Line Care  Start: 08/02/24 1620          sodium chloride 0.9 % flush 10 mL  Dose: 10 mL  Freq: Every 12 Hours Scheduled Route: IV  Start: 08/02/24 2100      0823-Given     2102-Given           0811-Given     2100             sodium chloride 0.9 % infusion 40 mL  Dose: 40 mL  Freq: As Needed Route: IV  PRN Reason: Line Care  Start: 08/02/24 1620     Admin Instructions:   Following administration of an IV intermittent medication, flush line with 40mL NS at 100mL/hr.          Completed Medications  Medications 08/07/24 08/08/24      ceFOXitin (MEFOXIN) 1,000 mg in sodium chloride 0.9 % 100 mL IVPB-VTB  Dose: 1,000 mg  Freq: Every 12 Hours Route: IV  Indications of Use: INTRA-ABDOMINAL INFECTION  Start: 08/03/24 0200 End: 08/04/24 1515     Admin Instructions:   Caution: Look alike/sound alike drug alert                Activate vial before using.          cefOXItin (MEFOXIN) 2,000 mg in sodium chloride 0.9 % 100 mL IVPB-VTB  Dose: 2,000 mg  Freq: Once Route: IV  Indications of Use: PERIOPERATIVE PHARMACOPROPHYLAXIS  Start: 08/02/24 1044 End: 08/02/24 1404     Admin Instructions:   Caution: Look alike/sound alike drug alert                Activate vial before using.          ipratropium-albuterol (DUO-NEB) nebulizer solution 3 mL  Dose: 3 mL  Freq: Once As Needed Route: NEBULIZATION  PRN Reasons: Wheezing,Shortness of Air  PRN Comment: bronchospasm  Start: 08/02/24 1510 End: 08/02/24 1518          lactated ringers infusion  Dose: 125 mL/hr  Freq: Once Route: IV  Start: 08/02/24 1050 End: 08/02/24 1451          Discontinued Medications  Medications 08/07/24 08/08/24      acetaminophen (TYLENOL) tablet 500 mg  Dose: 500 mg  Freq: Every 6 Hours PRN Route: PO  PRN Reason: Mild Pain  Start: 08/02/24 1620 End: 08/03/24 0815     Admin Instructions:   If given for fever, use fever parameter: fever greater than 100.4 °F  Based on patient request - if ordered for moderate or severe pain,  provider allows for administration of a medication prescribed for a lower pain scale.    Do not exceed 4 grams of acetaminophen in a 24 hr period. Max dose of 2gm for AST/ALT greater than 120 units/L.    If given for pain, use the following pain scale:   Mild Pain = Pain Score of 1-3, CPOT 1-2  Moderate Pain = Pain Score of 4-6, CPOT 3-4  Severe Pain = Pain Score of 7-10, CPOT 5-8          albuterol (PROVENTIL) nebulizer solution 0.083% 2.5 mg/3mL  Dose: 2.5 mg  Freq: Every 6 Hours - RT Route: NEBULIZATION  Start: 08/04/24 1300 End: 08/04/24 1042     Admin Instructions:   Include Respiratory Treatment Education          albuterol (PROVENTIL) nebulizer solution 0.083% 2.5 mg/3mL  Dose: 2.5 mg  Freq: Every 6 Hours PRN Route: NEBULIZATION  PRN Reason: Wheezing  Start: 08/02/24 1653 End: 08/04/24 0956     Admin Instructions:   Include Respiratory Treatment Education          albuterol sulfate HFA (PROVENTIL HFA;VENTOLIN HFA;PROAIR HFA) inhaler 1 puff  Dose: 1 puff  Freq: Every 6 Hours PRN Route: IN  PRN Reason: Wheezing  Start: 08/02/24 1620 End: 08/02/24 1653     Admin Instructions:   Include Respiratory Treatment Education   Shake well.          BUPivacaine-meloxicam ER (ZYNRELEF) 200-6 MG/7ML injection  Freq: As Needed  Start: 08/02/24 1429 End: 08/02/24 1500          fentaNYL citrate (PF) (SUBLIMAZE) injection 50 mcg  Dose: 50 mcg  Freq: Every 5 Minutes PRN Route: IV  PRN Reasons: Moderate Pain,Severe Pain  Start: 08/02/24 1510 End: 08/02/24 1611     Admin Instructions:   Maximum total dose of fentanyl is 100 mcg.  If given for pain, use the following pain scale:  Mild Pain = Pain Score of 1-3, CPOT 1-2  Moderate Pain = Pain Score of 4-6, CPOT 3-4  Severe Pain = Pain Score of 7-10, CPOT 5-8          HYDROmorphone (DILAUDID) injection 0.5 mg  Dose: 0.5 mg  Freq: Every 2 Hours PRN Route: IV  PRN Reason: Severe Pain  Start: 08/02/24 1620 End: 08/07/24 1619     Admin Instructions:   Based on patient request - if ordered  for moderate or severe pain, provider allows for administration of a medication prescribed for a lower pain scale.  If given for pain, use the following pain scale:  Mild Pain = Pain Score of 1-3, CPOT 1-2  Moderate Pain = Pain Score of 4-6, CPOT 3-4  Severe Pain = Pain Score of 7-10, CPOT 5-8      1411-Given               ketorolac (TORADOL) injection 15 mg  Dose: 15 mg  Freq: Every 6 Hours PRN Route: IV  PRN Reason: Severe Pain  Start: 08/03/24 0814 End: 08/07/24 1619     Admin Instructions:   Based on patient request - if ordered for moderate or severe pain, provider allows for administration of a medication prescribed for a lower pain scale.      If given for pain, use the following pain scale:  Mild Pain = Pain Score of 1-3, CPOT 1-2  Moderate Pain = Pain Score of 4-6, CPOT 3-4  Severe Pain = Pain Score of 7-10, CPOT 5-8      1057-Given               ketorolac (TORADOL) injection 30 mg  Dose: 30 mg  Freq: Every 6 Hours PRN Route: IV  PRN Reason: Severe Pain  Start: 08/02/24 1620 End: 08/03/24 0815     Admin Instructions:   Based on patient request - if ordered for moderate or severe pain, provider allows for administration of a medication prescribed for a lower pain scale.      If given for pain, use the following pain scale:  Mild Pain = Pain Score of 1-3, CPOT 1-2  Moderate Pain = Pain Score of 4-6, CPOT 3-4  Severe Pain = Pain Score of 7-10, CPOT 5-8          lactated ringers infusion  Dose: 100 mL/hr  Freq: Once As Needed Route: IV  PRN Comment: recovery only  Start: 08/02/24 1510 End: 08/02/24 1611          meperidine (DEMEROL) injection 12.5 mg  Dose: 12.5 mg  Freq: Every 5 Minutes PRN Route: IV  PRN Reason: Shivering  PRN Comment: May repeat x 1  Start: 08/02/24 1510 End: 08/02/24 1611     Admin Instructions:             midazolam (VERSED) injection 1 mg  Dose: 1 mg  Freq: Every 10 Minutes PRN Route: IV  PRN Comment: Anxiety prophylaxis, Pre-op comfort  Start: 08/02/24 1048 End: 08/02/24 1506     Admin  Instructions:   May repeat dose in 10 minutes one time then contact provider for additional orders.            ondansetron (ZOFRAN) injection 4 mg  Dose: 4 mg  Freq: As Needed Route: IV  PRN Reasons: Nausea,Vomiting  Start: 08/02/24 1510 End: 08/02/24 1611     Admin Instructions:   If BOTH ondansetron (ZOFRAN) and promethazine (PHENERGAN) are ordered use ondansetron first and THEN promethazine IF ondansetron is ineffective.          oxyCODONE-acetaminophen (PERCOCET) 5-325 MG per tablet 1 tablet  Dose: 1 tablet  Freq: Every 8 Hours PRN Route: PO  PRN Reason: Moderate Pain  Start: 08/02/24 1620 End: 08/03/24 0815     Admin Instructions:   Based on patient request - if ordered for moderate or severe pain, provider allows for administration of a medication prescribed for a lower pain scale.  [ELIESER]    Do not exceed 4 grams of acetaminophen in a 24 hr period. Max dose of 2gm for AST/ALT greater than 120 units/L        If given for pain, use the following pain scale:   Mild Pain = Pain Score of 1-3, CPOT 1-2  Moderate Pain = Pain Score of 4-6, CPOT 3-4  Severe Pain = Pain Score of 7-10, CPOT 5-8          oxyCODONE-acetaminophen (PERCOCET) 5-325 MG per tablet 1 tablet  Dose: 1 tablet  Freq: Once As Needed Route: PO  PRN Reason: Moderate Pain  Start: 08/02/24 1510 End: 08/02/24 1611     Admin Instructions:   [ELIESER]    Do not exceed 4 grams of acetaminophen in a 24 hr period. Max dose of 2gm for AST/ALT greater than 120 units/L        If given for pain, use the following pain scale:   Mild Pain = Pain Score of 1-3, CPOT 1-2  Moderate Pain = Pain Score of 4-6, CPOT 3-4  Severe Pain = Pain Score of 7-10, CPOT 5-8          sodium chloride (NS) irrigation solution  Freq: As Needed  Start: 08/02/24 1242 End: 08/02/24 1500          sodium chloride 0.9 % flush 10 mL  Dose: 10 mL  Freq: As Needed Route: IV  PRN Reason: Line Care  Start: 08/02/24 1048 End: 08/02/24 1506          sodium chloride 0.9 % flush 10 mL  Dose: 10 mL  Freq:  Every 12 Hours Scheduled Route: IV  Start: 08/02/24 1050 End: 08/02/24 1506          sodium chloride 0.9 % infusion 40 mL  Dose: 40 mL  Freq: As Needed Route: IV  PRN Reason: Line Care  Start: 08/02/24 1048 End: 08/02/24 1506     Admin Instructions:   Following administration of an IV intermittent medication, flush line with 40mL NS at 100mL/hr.                      Lab Results (last 24 hours)       Procedure Component Value Units Date/Time    Respiratory Culture - Sputum, Cough [[id number]] Collected: 08/07/24 1641    Specimen: Sputum from Cough Updated: 08/08/24 0741     Respiratory Culture Moderate growth (3+) The culture consists of normal respiratory daryn. This is a preliminary report; final report to follow.     Gram Stain Few (2+) Epithelial cells seen      Moderate (3+) WBCs seen      Many (4+) Mixed bacterial morphotypes seen on Gram Stain    Basic Metabolic Panel [[id number]]  (Abnormal) Collected: 08/08/24 0113    Specimen: Blood Updated: 08/08/24 0219     Glucose 175 mg/dL      BUN 16 mg/dL      Creatinine 0.85 mg/dL      Sodium 136 mmol/L      Potassium 4.3 mmol/L      Chloride 106 mmol/L      CO2 17.1 mmol/L      Calcium 9.2 mg/dL      BUN/Creatinine Ratio 18.8     Anion Gap 12.9 mmol/L      eGFR 102.0 mL/min/1.73     Narrative:      GFR Normal >60  Chronic Kidney Disease <60  Kidney Failure <15      C-reactive Protein [[id number]]  (Abnormal) Collected: 08/08/24 0113    Specimen: Blood Updated: 08/08/24 0218     C-Reactive Protein 10.00 mg/dL     CBC & Differential [[id number]]  (Abnormal) Collected: 08/08/24 0114    Specimen: Blood Updated: 08/08/24 0158    Narrative:      The following orders were created for panel order CBC & Differential.  Procedure                               Abnormality         Status                     ---------                               -----------         ------                     CBC Auto Differential[[id number]]        Abnormal            Final result              "    Please view results for these tests on the individual orders.    CBC Auto Differential [[id number]]  (Abnormal) Collected: 08/08/24 0114    Specimen: Blood Updated: 08/08/24 0158     WBC 15.36 10*3/mm3      RBC 3.96 10*6/mm3      Hemoglobin 13.1 g/dL      Hematocrit 39.5 %      MCV 99.7 fL      MCH 33.1 pg      MCHC 33.2 g/dL      RDW 14.8 %      RDW-SD 54.9 fl      MPV 10.7 fL      Platelets 393 10*3/mm3      Neutrophil % 86.9 %      Lymphocyte % 9.0 %      Monocyte % 2.7 %      Eosinophil % 0.1 %      Basophil % 0.3 %      Immature Grans % 1.0 %      Neutrophils, Absolute 13.33 10*3/mm3      Lymphocytes, Absolute 1.39 10*3/mm3      Monocytes, Absolute 0.42 10*3/mm3      Eosinophils, Absolute 0.01 10*3/mm3      Basophils, Absolute 0.05 10*3/mm3      Immature Grans, Absolute 0.16 10*3/mm3      nRBC 0.0 /100 WBC     Procalcitonin [[id number]]  (Normal) Collected: 08/07/24 1047    Specimen: Blood Updated: 08/07/24 1138     Procalcitonin 0.16 ng/mL     Narrative:      As a Marker for Sepsis (Non-Neonates):    1. <0.5 ng/mL represents a low risk of severe sepsis and/or septic shock.  2. >2 ng/mL represents a high risk of severe sepsis and/or septic shock.    As a Marker for Lower Respiratory Tract Infections that require antibiotic therapy:    PCT on Admission    Antibiotic Therapy       6-12 Hrs later    >0.5                Strongly Recommended  >0.25 - <0.5        Recommended   0.1 - 0.25          Discouraged              Remeasure/reassess PCT  <0.1                Strongly Discouraged     Remeasure/reassess PCT    As 28 day mortality risk marker: \"Change in Procalcitonin Result\" (>80% or <=80%) if Day 0 (or Day 1) and Day 4 values are available. Refer to http://www.[employer]-pct-calculator.com    Change in PCT <=80%  A decrease of PCT levels below or equal to 80% defines a positive change in PCT test result representing a higher risk for 28-day all-cause mortality of patients diagnosed with severe sepsis for septic " shock.    Change in PCT >80%  A decrease of PCT levels of more than 80% defines a negative change in PCT result representing a lower risk for 28-day all-cause mortality of patients diagnosed with severe sepsis or septic shock.       BNP [[id number]]  (Normal) Collected: 24 0113    Specimen: Blood Updated: 24 1104     proBNP 332.6 pg/mL     Narrative:      This assay is used as an aid in the diagnosis of individuals suspected of having heart failure. It can be used as an aid in the diagnosis of acute decompensated heart failure (ADHF) in patients presenting with signs and symptoms of ADHF to the emergency department (ED). In addition, NT-proBNP of <300 pg/mL indicates ADHF is not likely.    Age Range Result Interpretation  NT-proBNP Concentration (pg/mL:      <50             Positive            >450                   Gray                 300-450                    Negative             <300    50-75           Positive            >900                  Gray                300-900                  Negative            <300      >75             Positive            >1800                  Gray                300-1800                  Negative            <300    TISSUE EXAM, [facility]) [[id number]] Collected: 24 1332    Specimen: Tissue from Large Intestine, Appendix; Tissue from Large Intestine, Sigmoid Colon Updated: 24 1102     Reference Lab Report --     [facility]  [address] [place] [address]  Phone: [phone] or [phone]  Fax: [phone]  Vinicius Gong M.D., Medical Director    PATIENT NAME                           LABORATORY NO.  [id number]  TEDDY COOK                         [id number]  [id number]                         AGE              SEX  N           CLIENT REF #  [facility]              58      1965  KARSTEN    [id number]   [id number]    [address]                     REQUESTING M.D.     ATTENDING M.D.     COPY  [place] [address]                   LUCY BAIRES  DATE COLLECTED      DATE RECEIVED      DATE REPORTED  08/02/2024 08/05/2024 08/07/2024    DIAGNOSIS:  A.   APPENDIX:  Fibrous obliteration of the appendiceal tip  Incidental periappendiceal benign cyst  No identified dysplasia or malignancy  B.   COLON, RESECTION, SIGMOID COLON:  Diverticulosis coli with associated intramural abscesses  Acute serositis and serosal fibrous adhesions  2 benign lymph nodes  Examined margins of resection are viable  No identified dysplasia or malignancy    CLINICAL HISTORY:  Colovesical fistula    SPECIMENS RECEIVED:  A.  APPENDIX  B.  COLON, RESECTION, SIGMOID COLON    MICROSCOPIC DESCRIPTION:  A.  Tissue blocks are prepared and slides are examined microscopically on all  specimens. See diagnosis for details.  B.  Tissue blocks are prepared and slides are examined microscopically on all  specimens. See diagnosis for details.    Professional interpretation rendered by Destiny Carpenter M.D. at [facility], [address] [place] [address].    GROSS DESCRIPTION:  A.  Received in formalin labeled \"appendix\" is a 6 cm long by 0.8 cm in  diameter intact appendix with a moderate amount of attached  mesoappendix.  The serosa is smooth and glistening and has a 0.3 cm tan  nodule located 0.7 cm from the proximal margin.  The proximal margin is  inked black and sectioning reveals a 0.4 cm in average diameter lumen  filled with soft, green fecal material.  The mucosa is tan and glistening and  the unremarkable tan appendiceal wall averages 0.2 cm thick.  No masses  or fecaliths are identified.  Representative sections to include the en face  proximal margin, half of the bisected tip and cross-section to include  serosal nodule are submitted in block A1.  B.  Received in formalin labeled \"sigmoid colon\" is a 13.5 cm long by 2.5 cm  in average diameter intact, unopened and tortuous segment of sigmoid  colon encased in " mildly indurated pericolonic fat.  The serosa is gray-tan  and dusky with dense adhesions.  The lumen contains a minimal amount of  soft, brown fecal material and the mucosa is tan and glistening with normal  folds.  Sectioning reveals a diffusely thickened bowel wall averaging 1 cm  thick.  Located at least 3 cm from the mucosal margins is a red-brown  fistula tract.  Located approximately 2 cm from the fistula is a possible  perforation and corresponding abscess formation.  No polyps or invasive  lesions are identified.  There are no enlarged lymph nodes or mesenteric  masses identified.  Representative sections are submitted as follows: B1:  Proximal mucosal margin en face; B2-B3: Distal mucosal margin, en face;  B4-B5: Fistula; B6: Possible perforation.  HDM    REVIEWED, DIAGNOSED AND ELECTRONICALLY  SIGNED BY:    Destiny Carpenter M.D.  CPT CODES:  75330, 02623      Blood Gas, Arterial With Co-Ox [[id number]]  (Abnormal) Collected: 08/07/24 1028    Specimen: Arterial Blood Updated: 08/07/24 1035     Site Right Radial     Jesús's Test Positive     pH, Arterial 7.441 pH units      pCO2, Arterial 28.9 mm Hg      Comment: 84 Value below reference range        pO2, Arterial 63.0 mm Hg      Comment: 84 Value below reference range        HCO3, Arterial 19.7 mmol/L      Comment: 84 Value below reference range        Base Excess, Arterial -3.3 mmol/L      O2 Saturation, Arterial 92.7 %      Comment: 84 Value below reference range        Hemoglobin, Blood Gas 12.9 g/dL      Comment: 84 Value below reference range        Hematocrit, Blood Gas 39.6 %      Oxyhemoglobin 91.3 %      Comment: 84 Value below reference range        Methemoglobin 0.20 %      Carboxyhemoglobin 1.3 %      A-a DO2 148.6 mmHg      CO2 Content 20.6 mmol/L      Barometric Pressure for Blood Gas 723 mmHg      Modality Nasal Cannula     FIO2 36 %      Flow Rate 4.0 lpm      Ventilator Mode NA     Collected by [id number]     Comment: Meter: [id number]      :  [id number]        pH, Temp Corrected --     pCO2, Temperature Corrected --     pO2, Temperature Corrected --          Imaging Results (Last 24 Hours)       Procedure Component Value Units Date/Time    XR Abdomen 2+ VW with Chest 1 VW [[id number]] Collected: 08/07/24 1241     Updated: 08/07/24 1244    Narrative:      EXAM:    XR Abdomen, 2 Views and XR Chest, 1 View     EXAM DATE:    8/7/2024 12:27 PM     CLINICAL HISTORY:    cough; 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     COMPARISON:    No relevant prior studies available.     FINDINGS:    Lungs and pleural spaces:  Right upper lobe airspace disease.  Left  lower lobe airspace disease.  No consolidation.  No pneumothorax.    Heart:  Unremarkable as visualized.  No cardiomegaly.    Mediastinum:  Unremarkable as visualized.  Normal mediastinal contour.    Intraperitoneal space:  No free air.    Gastrointestinal tract:  Unremarkable as visualized.  No dilation.    Bones/joints:  Unremarkable as visualized.  No acute fracture.    Tubes, lines and devices:  Bilateral ureteral stents.    Other findings:  Midline laparotomy changes noted.       Impression:      1.  Mild bilateral airspace disease.  2.  Postsurgical changes abdomen.  3. Nonobstructive bowel gas pattern.        This report was finalized on 8/7/2024 12:42 PM by Dr. Behzad Pickett MD.             Orders (last 24 hrs)        Start     Ordered    08/08/24 0600  CBC (No Diff)  Morning Draw,   Status:  Canceled         08/07/24 0858    08/08/24 0600  C-reactive Protein  Morning Draw         08/07/24 0858    08/08/24 0600  Basic Metabolic Panel  Morning Draw         08/07/24 1019    08/08/24 0600  CBC & Differential  Morning Draw         08/07/24 1019    08/08/24 0600  CBC Auto Differential  PROCEDURE ONCE         08/07/24 2202    08/07/24 1700  doxycycline (VIBRAMYCIN) 100 mg in sodium  chloride 0.9 % 100 mL IVPB-VTB  Every 12 Hours         08/07/24 1527    08/07/24 1630  cefTRIAXone (ROCEPHIN) 1,000 mg in sodium chloride 0.9 % 100 mL IVPB-VTB  Every 24 Hours         08/07/24 1527    08/07/24 1615  methylPREDNISolone sodium succinate (SOLU-Medrol) injection 40 mg  Every 12 Hours         08/07/24 1526    08/07/24 1530  Please begin IV antibiotics after obtaining sputum sample; thanks  Misc Nursing Order (Specify)  Once        Comments: Please begin IV antibiotics after obtaining sputum sample; thanks    08/07/24 1529    08/07/24 1524  Respiratory Culture - Sputum, Cough  Once         08/07/24 1523    08/07/24 1036  Blood Gas, Arterial With Co-Ox  PROCEDURE ONCE         08/07/24 1028    08/07/24 1018  BNP  Once         08/07/24 1019    08/07/24 1017  Procalcitonin  Once         08/07/24 1019    08/04/24 1300  ipratropium-albuterol (DUO-NEB) nebulizer solution 3 mL  Every 6 Hours - RT         08/04/24 1042    08/04/24 1057  Strip Bulb Suction  Every Shift       08/04/24 1056    08/04/24 1042  albuterol (PROVENTIL) nebulizer solution 0.083% 2.5 mg/3mL  Every 4 Hours PRN         08/04/24 1042    08/03/24 1400  heparin (porcine) 5000 UNIT/ML injection 5,000 Units  Every 8 Hours Scheduled         08/03/24 0815    08/03/24 1200  methocarbamol (ROBAXIN) tablet 750 mg  4 Times Daily         08/03/24 0815    08/03/24 1000  acetaminophen (TYLENOL) tablet 1,000 mg  Every 6 Hours         08/03/24 0815    08/03/24 1000  Incentive Spirometry  Every 4 Hours While Awake       08/03/24 0815    08/03/24 0900  amLODIPine (NORVASC) tablet 10 mg  Daily         08/02/24 1620    08/03/24 0900  sertraline (ZOLOFT) tablet 100 mg  Daily         08/02/24 1620    08/03/24 0815  Phosphorus Replacement - Follow Nurse / BPA Driven Protocol  As Needed         08/03/24 0816    08/03/24 0815  Magnesium Standard Dose Replacement - Follow Nurse / BPA Driven Protocol  As Needed         08/03/24 0816    08/03/24 0815  Potassium  "Replacement - Follow Nurse / BPA Driven Protocol  As Needed         08/03/24 0816    08/03/24 0814  oxyCODONE (ROXICODONE) immediate release tablet 5 mg  Every 4 Hours PRN         08/03/24 0815    08/03/24 0814  ketorolac (TORADOL) injection 15 mg  Every 6 Hours PRN         08/03/24 0815    08/02/24 2100  metoprolol tartrate (LOPRESSOR) tablet 25 mg  2 Times Daily         08/02/24 1620 08/02/24 2100  sodium chloride 0.9 % flush 10 mL  Every 12 Hours Scheduled         08/02/24 1620 08/02/24 2100  sennosides-docusate (PERICOLACE) 8.6-50 MG per tablet 2 tablet  2 Times Daily        Placed in \"And\" Linked Group    08/02/24 1620    08/02/24 2000  Vital Signs  Every 4 Hours      Comments: Per per hospital policy    08/02/24 1620 08/02/24 1800  Oral Care  2 Times Daily       08/02/24 1620    08/02/24 1715  hydrALAZINE (APRESOLINE) tablet 50 mg  3 Times Daily         08/02/24 1620    08/02/24 1715  [Held by provider]  lactated ringers infusion  Continuous        (On hold since yesterday at 1019 until manually unheld; held by Nick Morrell PA-CHold Reason: Other (Comment Required))    08/02/24 1620    08/02/24 1620  polyethylene glycol (MIRALAX) packet 17 g  Daily PRN        Placed in \"And\" Linked Group    08/02/24 1620    08/02/24 1620  bisacodyl (DULCOLAX) EC tablet 5 mg  Daily PRN        Placed in \"And\" Linked Group    08/02/24 1620    08/02/24 1620  nitroglycerin (NITROSTAT) SL tablet 0.4 mg  Every 5 Minutes PRN         08/02/24 1620    08/02/24 1620  Intake & Output  Every Shift       08/02/24 1620    08/02/24 1620  sodium chloride 0.9 % flush 10 mL  As Needed         08/02/24 1620    08/02/24 1620  sodium chloride 0.9 % infusion 40 mL  As Needed         08/02/24 1620    08/02/24 1620  HYDROmorphone (DILAUDID) injection 0.5 mg  Every 2 Hours PRN         08/02/24 1620    08/02/24 1620  ondansetron (ZOFRAN) injection 4 mg  Every 6 Hours PRN         08/02/24 1620    Unscheduled  Change Dressing  As " Needed      Comments: Change midline dressing covering drain as needed with an island dressing.    08/04/24 1122                     Physician Progress Notes (last 48 hours)        Boogie Hays MD at 08/07/24 0858          POD#5 colon resection, colostomy    He is a obese 59 yo smoker who had a difficult sigmoid resection for colovesical fistula. So far, he has progressed well with diet and colostomy is working. His drain output is low and is serous. Benton is in and will be removed likely outpatient. He has ureteral stents in as well. He had used O2 at home in the past some but not recently. He has had a cough and WBC did go up slightly again since yesterday, where it had been slowly coming down. CRP is still going down. He does have some rattling and coughing as well as looking a little more short of breath. Antibiotics were stopped a couple of days ago. A CXR and ABGs will be checked.     Electronically signed by Boogie Hays MD at 08/07/24 0902          Consult Notes (last 48 hours)        Nick Morrell PA-C at 08/07/24 0947        Consult Orders    1. Inpatient Hospitalist Consult [[id number]] ordered by Boogie Hays MD              Attestation signed by Maddy Dunn DO at 08/07/24 1601    I have reviewed this documentation and agree.  Patient is seen and examined at bedside.  States that he has not used supplemental oxygen at home in quite some time.  Reports worsening cough compared to baseline.  Reports cough is productive and also reports shortness of air that has been present and progressive in the postoperative setting.    On exam, the patient has bilateral inspiratory and expiratory wheezing with occasional bilateral rhonchi.  No rales.  Heart is regular rate and rhythm without obvious murmur.  Abdomen is soft, distended with normal active bowel sounds.  Benton catheter in place with dark appearing urine and sediment.      Patient's proBNP and procalcitonin levels are within  normal limits.  Chest x-ray with mild bilateral airspace disease; nonspecific.    -Suspect patient has an acute COPD exacerbation with possibly some minor volume overload    Continue with supplemental oxygen and titrate for saturations 90 to 95%.  Continue to hold IV fluids if okay with primary team.  Encouraged incentive spirometry use.  Begin IV Solu-Medrol for treatment of a COPD exacerbation.  Continue scheduled nebulized inhalants.  Obtain a respiratory culture and begin IV antibiotic therapy for treatment of a suspected acute bronchitis.                         [facility] Medicine Services  CONSULT NOTE     Inpatient Hospitalist Consult  Consult performed by: Nick Morrell PA-C  Consult ordered by: Boogie Hays MD        Patient Identification:  Name:  Celestine Arriaga  Age:  58 y.o.  Sex:  male  :  1965  MRN:  [id number]  Visit Number:  [id number]  Primary care provider:  Rachell Rivers APRN    Length of stay:  5    Subjective     Chief Complaint:  No chief complaint on file.      History of presenting illness:     Celestine Arriaga is a 58 y.o. male admitted by general surgery secondary to colovesical fistula.  He had sigmoid colon resection, appendectomy and cystoscopy with ureteral stent placement.  PMHx significant for CHF, COPD, CAD, diabetes mellitus, hyperlipidemia, GERD, hypertension     Hospital medicine was consulted for cough, COPD.    Most recent vital signs temp 97.9, heart rate 64, respirations 19, /96, SpO2 96% on 4 L per nasal cannula.  A.m. labs show CRP decreased from previous, white blood cell count is up from 15.6-17.34 overnight.  ABG and CXR pending.    Patient was seen and examined on 3N sitting up in bedside chair. He appeared comfortable on 4L nc. He reports shortness of breath persisting since waking up from anesthesia but maybe somewhat improved over the last day or so. He does have a productive cough- clear sputum. No subjective  fevers. No reported nasal congestion or rhinorrhea. He is a smoker with diagnosis of COPD but does not require supplemental O2 at baseline. Chart does also list CHF, EF in 10/23 on chart review was 50-55%. No overt swelling that he has noticed or on exam. He reports tolerating diet OK currently with occasional early AM nausea.      ---------------------------------------------------------------------------------------------------------------------  Review of Systems   Constitutional:  Negative for chills and fever.   HENT:  Negative for congestion and rhinorrhea.    Respiratory:  Positive for cough and shortness of breath.    Cardiovascular:  Negative for chest pain and leg swelling.   Gastrointestinal:  Positive for nausea.   Musculoskeletal:  Negative for arthralgias and myalgias.   Skin:  Negative for rash and wound.   Neurological:  Negative for dizziness and light-headedness.          ---------------------------------------------------------------------------------------------------------------------   Past History:  Past Medical History:   Diagnosis Date    Arthritis     Chest pain     CHF (congestive heart failure)     COPD (chronic obstructive pulmonary disease)     Coronary artery disease     Diabetes mellitus     Elevated cholesterol     GERD (gastroesophageal reflux disease)     [place] (hard of hearing)     Hypertension     MI (myocardial infarction)     cardiac dr told him he had had a mild attack    Neuropathy     LOWER LEGS/FEET    Urinary tract infection      Past Surgical History:   Procedure Laterality Date    APPENDECTOMY N/A 8/2/2024    Procedure: APPENDECTOMY;  Surgeon: Boogie Hays MD;  Location: [facility];  Service: General;  Laterality: N/A;    BACK SURGERY      CARDIAC CATHETERIZATION      CARDIAC SURGERY      COLON RESECTION N/A 8/2/2024    Procedure: COLON RESECTION SIGMOID;  Surgeon: Boogie Hays MD;  Location: [facility];  Service: General;  Laterality: N/A;    COLONOSCOPY       COLONOSCOPY N/A 07/18/2024    Procedure: COLONOSCOPY;  Surgeon: Boogie Hays MD;  Location: [facility] OR;  Service: Gastroenterology;  Laterality: N/A;    COLOSTOMY N/A 8/2/2024    Procedure: COLOSTOMY LOOP;  Surgeon: Boogie Hays MD;  Location: [facility] OR;  Service: General;  Laterality: N/A;    CORONARY ANGIOPLASTY WITH STENT PLACEMENT      3 STENTS TOTAL    CYSTOSCOPY W/ URETERAL STENT PLACEMENT Bilateral 8/2/2024    Procedure: URETEROSCOPY URETERAL CATHETER/STENT INSERTION;  Surgeon: Boogie Hays MD;  Location: [facility] OR;  Service: General;  Laterality: Bilateral;    EYE SURGERY Right     TOE AMPUTATION Left     great toe in  accident     Family History   Problem Relation Age of Onset    No Known Problems Father     Cancer Mother      Social History     Socioeconomic History    Marital status:    Tobacco Use    Smoking status: Every Day     Current packs/day: 1.50     Average packs/day: 1.5 packs/day for 40.6 years (60.9 ttl pk-yrs)     Types: Cigarettes     Start date: 1984     Passive exposure: Current    Smokeless tobacco: Former     Types: Chew   Vaping Use    Vaping status: Never Used   Substance and Sexual Activity    Alcohol use: Not Currently     Alcohol/week: 2.0 standard drinks of alcohol     Types: 2 Shots of liquor per week     Comment: 2 mixed drinks a night    Drug use: Never    Sexual activity: Defer     Birth control/protection: None     ---------------------------------------------------------------------------------------------------------------------   Allergies:  Lisinopril  ---------------------------------------------------------------------------------------------------------------------   Prior to Admission Medications       Prescriptions Last Dose Informant Patient Reported? Taking?    acetaminophen (TYLENOL) 500 MG tablet 8/1/2024 Self, Other Yes Yes    Take 1 tablet by mouth Every 6 (Six) Hours As Needed for Mild Pain.    albuterol sulfate  (90 Base)  MCG/ACT inhaler 8/1/2024 Self, Other Yes Yes    Inhale 1 puff Every 6 (Six) Hours As Needed for Wheezing.    amLODIPine (NORVASC) 10 MG tablet 8/2/2024 Self, Other Yes Yes    Take 1 tablet by mouth Daily.    aspirin 81 MG EC tablet 8/1/2024 Self, Other Yes Yes    Take 1 tablet by mouth Daily.    hydrALAZINE (APRESOLINE) 50 MG tablet 8/1/2024 Self, Other Yes Yes    Take 1 tablet by mouth 3 (Three) Times a Day.    metoprolol tartrate (LOPRESSOR) 25 MG tablet 8/2/2024 Self, Other Yes Yes    Take 1 tablet by mouth 2 (Two) Times a Day.    nitroglycerin (NITROSTAT) 0.4 MG SL tablet Unknown Self, Other Yes No    Place 1 tablet under the tongue Every 5 (Five) Minutes As Needed for Chest Pain. Take no more than 3 doses in 15 minutes.    Ozempic, 0.25 or 0.5 MG/DOSE, 2 MG/3ML solution pen-injector 7/11/2024 Self, Other Yes No    INJECT 0.25 MG  SUBCUTANEOUSLY ONCE A WEEK    sertraline (ZOLOFT) 100 MG tablet 8/2/2024 Self, Other Yes Yes    Take 1 tablet by mouth Daily.          ---------------------------------------------------------------------------------------------------------------------     Objective      Procedures:    Hospital Meds:  acetaminophen, 1,000 mg, Oral, Q6H  amLODIPine, 10 mg, Oral, Daily  heparin (porcine), 5,000 Units, Subcutaneous, Q8H  hydrALAZINE, 50 mg, Oral, TID  ipratropium-albuterol, 3 mL, Nebulization, Q6H - RT  methocarbamol, 750 mg, Oral, 4x Daily  metoprolol tartrate, 25 mg, Oral, BID  senna-docusate sodium, 2 tablet, Oral, BID  sertraline, 100 mg, Oral, Daily  sodium chloride, 10 mL, Intravenous, Q12H      lactated ringers, 50 mL/hr, Last Rate: 50 mL/hr (08/07/24 0540)      ---------------------------------------------------------------------------------------------------------------------   Vital Signs:  Temp:  [97.5 °F (36.4 °C)-98.9 °F (37.2 °C)] 97.9 °F (36.6 °C)  Heart Rate:  [59-77] 64  Resp:  [19-22] 19  BP: (145-175)/(84-99) 165/96  Mean Arterial Pressure (Non-Invasive) for the past 24  hrs (Last 3 readings):   Noninvasive MAP (mmHg)   08/06/24 1128 123     SpO2 Percentage    08/07/24 0025 08/07/24 0600 08/07/24 0810   SpO2: 97% 94% 96%     SpO2:  [92 %-97 %] 96 %  on  Flow (L/min):  [4] 4;   Device (Oxygen Therapy): nasal cannula    Body mass index is 36.57 kg/m².  Wt Readings from Last 3 Encounters:   08/02/24 116 kg (254 lb 14.4 oz)   07/25/24 118 kg (260 lb)   07/18/24 118 kg (260 lb)        Intake/Output Summary (Last 24 hours) at 8/7/2024 0948  Last data filed at 8/7/2024 0540  Gross per 24 hour   Intake 1821.34 ml   Output 2345 ml   Net -523.66 ml     Diet: Gastrointestinal; Fiber-Restricted; Texture: Soft to Chew (NDD 3); Soft to Chew: Whole Meat; Fluid Consistency: Thin (IDDSI 0)  ---------------------------------------------------------------------------------------------------------------------   Physical exam:  Physical Exam  Vitals and nursing note reviewed.   Constitutional:       General: He is not in acute distress.  HENT:      Head: Normocephalic and atraumatic.   Eyes:      Extraocular Movements: Extraocular movements intact.   Cardiovascular:      Rate and Rhythm: Normal rate and regular rhythm.   Pulmonary:      Effort: Pulmonary effort is normal.      Breath sounds: Rhonchi (wet) present.   Musculoskeletal:      Right lower leg: No edema.      Left lower leg: No edema.   Skin:     General: Skin is warm and dry.   Neurological:      Mental Status: He is alert. Mental status is at baseline.   Psychiatric:         Mood and Affect: Mood normal.         Behavior: Behavior normal.       ---------------------------------------------------------------------------------------------------------------------   EKG:     ---------------------------------------------------------------------------------------------------------------------             Results from last 7 days   Lab Units 08/07/24  0113 08/06/24  0320 08/05/24  0235   CRP mg/dL 13.52* 19.34* 26.37*   WBC 10*3/mm3 17.34* 15.60*  "17.32*   HEMOGLOBIN g/dL 12.8* 12.5* 11.6*   HEMATOCRIT % 40.4 38.5 36.0*   MCV fL 102.0* 99.0* 101.4*   MCHC g/dL 31.7 32.5 32.2   PLATELETS 10*3/mm3 320 297 256     Results from last 7 days   Lab Units 08/05/24  0235 08/04/24  0032 08/03/24  0218   SODIUM mmol/L 137 135* 139   POTASSIUM mmol/L 4.0 3.9 4.5   MAGNESIUM mg/dL  --  1.8  --    CHLORIDE mmol/L 107 102 104   CO2 mmol/L 20.6* 21.2* 22.1   BUN mg/dL 10 16 18   CREATININE mg/dL 0.82 1.10 1.26   CALCIUM mg/dL 9.2 8.4* 8.6   GLUCOSE mg/dL 120* 112* 132*   Estimated Creatinine Clearance: 128.3 mL/min (by C-G formula based on SCr of 0.82 mg/dL).  No results found for: \"AMMONIA\"    No results found for: \"HGBA1C\", \"POCGLU\"  Lab Results   Component Value Date    HGBA1C 5.4 11/22/2015     No results found for: \"TSH\", \"FREET4\"    No results found for: \"BLOODCX\"  No results found for: \"URINECX\"  No results found for: \"WOUNDCX\"  No results found for: \"STOOLCX\"  No results found for: \"RESPCX\"  Pain Management Panel           No data to display              I have personally reviewed the above laboratory results.   ---------------------------------------------------------------------------------------------------------------------  Imaging Results (Last 7 Days)       Procedure Component Value Units Date/Time    XR Chest 1 View [[id number]] Collected: 08/03/24 1947     Updated: 08/03/24 1950    Narrative:      Comparison: None     Findings:  1.  No consolidation  2.  No pulmonary edema, pleural effusion or pneumothorax       Impression:      Impression:  No acute process.        This report was finalized on 8/3/2024 7:48 PM by Ibrahima Trujillo DO.       [facility] [[id number]] Collected: 08/02/24 1339     Updated: 08/02/24 1342    Narrative:      EXAMINATION: [place] [facility] FLUORO-      CLINICAL INDICATION: ureatal stents        COMPARISON: None available     Total fluoroscopy time 15.8 seconds     Fluoroscopy was provided and 4 images were acquired's stent was placed   "   For a full procedure report, please see the report provided by the  performing physician        This report was finalized on 8/2/2024 1:40 PM by Dr. Kenneth Jones MD.             I have personally reviewed the above radiology results.   ----------------------------------------------------------------------------------------------------------------------    Assessment/Plan     Shortness of breath  Cough  In the setting of underlying COPD  History of CHF with preserved EF  Medicine consulted for cough, O2 requirement greater than baseline and shortness of breath in the setting of COPD. Patient reports shortness of breath has been persistent post operatively and he is complaining of cough productive of clear sputum.  WBC trend up overnight from 15 to 17k. Had received cefoxitin perioperatively, stopped after 8/4  O2 sats currently mid 90s on 4L. Baseline is RA.   ABG and CXR pending  Will further assess with procal, BNP.   Add BMP for renal function should he require diuresis.   Hold further fluids for now  Continue scheduled duo-neb  We will follow up results and treat as indicated    Colovesical fistula s/p sigmoid resection, colostomy, and ureteral stent placement  Continue management per primary    CAD   Diabetes mellitus   HLD  HTN  Home regimen continued per primary.   BP trend stable, slightly above goal. Will monitor closely.   Glucose trend okay. Would add SSI if needed.    Thank you for the consult and allowing us to participate in the care of your patient, hospital medicine will continue to follow. Please do not hesitate to call with any questions or concerns.      Nick Morrell PA-C  08/07/24  09:48 EDT    Attestation: I personally discussed the patient's history of presenting illness, physical examination, assessment, and plan with my attending physician, Dr. Dunn.     Electronically signed by Maddy Dunn DO at 08/07/24 2595

## 2024-08-08 NOTE — PROGRESS NOTES
POD#6 colon resection    He is eating ok and ostomy working. Afeb and vss. His wound is ok and drain has very little out at all. His breathing is some better. Xray was ok. He can have the drain out and should be ok for discharge surgically.

## 2024-08-08 NOTE — PLAN OF CARE
Goal Outcome Evaluation:              Outcome Evaluation: Pt resting in bed at thist time. VSS. REBECCA drain stripped and drained. No concerns or complaints at this time. Will continue plan of care.

## 2024-08-08 NOTE — PLAN OF CARE
Goal Outcome Evaluation:              Outcome Evaluation: Pt being discharged home.

## 2024-08-08 NOTE — PROGRESS NOTES
Patient Identification:  Name:  Celestine Arriaga  Age:  58 y.o.  Sex:  male  :  1965  MRN:  [id number]  Visit Number:  [id number]  Primary Care Provider:  Rachell Rivers APRN    Length of stay:  6    Subjective/Interval History/Consultants/Procedures     Chief Complaint: No chief complaint on file.      Subjective/Interval History:    58 y.o. male who was admitted on 2024 with colovesical fistula. Medicine consulted for shortness of breath, hypoxia.     PMH is significant for CHF, COPD, CAD, diabetes mellitus, hyperlipidemia, GERD, hypertension   For complete admission information, please see history and physical.     Today, the patient was seen and examined with RN at bedside. He was sitting up in bed, appeared comfortable on  4L per NC. He reports work of breathing improved today. He denied new complaint.     Room location at the time of evaluation was [facility].    ----------------------------------------------------------------------------------------------------------------------  Current Hospital Meds:  acetaminophen, 1,000 mg, Oral, Q6H  amLODIPine, 10 mg, Oral, Daily  cefTRIAXone, 1,000 mg, Intravenous, Q24H  doxycycline, 100 mg, Intravenous, Q12H  heparin (porcine), 5,000 Units, Subcutaneous, Q8H  hydrALAZINE, 50 mg, Oral, TID  ipratropium-albuterol, 3 mL, Nebulization, Q6H - RT  methocarbamol, 750 mg, Oral, 4x Daily  methylPREDNISolone sodium succinate, 40 mg, Intravenous, Q12H  metoprolol tartrate, 25 mg, Oral, BID  senna-docusate sodium, 2 tablet, Oral, BID  sertraline, 100 mg, Oral, Daily  sodium chloride, 10 mL, Intravenous, Q12H      [Held by provider] lactated ringers, 50 mL/hr, Last Rate: Stopped (24 1058)      ----------------------------------------------------------------------------------------------------------------------      Objective     Vital Signs:  Temp:  [97.4 °F (36.3 °C)-98 °F (36.7 °C)] 97.4 °F (36.3 °C)  Heart Rate:  [59-77] 66  Resp:  [18-20] 18  BP:  (157-175)/(86-97) 159/86      08/02/24  1107 08/02/24  1620   Weight: 115 kg (254 lb 9.6 oz) 116 kg (254 lb 14.4 oz)     Body mass index is 36.57 kg/m².    Intake/Output Summary (Last 24 hours) at 8/8/2024 1009  Last data filed at 8/8/2024 0345  Gross per 24 hour   Intake 1410 ml   Output 2610 ml   Net -1200 ml     No intake/output data recorded.  Diet: Gastrointestinal; Fiber-Restricted; Texture: Soft to Chew (NDD 3); Soft to Chew: Whole Meat; Fluid Consistency: Thin (IDDSI 0)  ----------------------------------------------------------------------------------------------------------------------    Physical Exam  Vitals and nursing note reviewed.   Constitutional:       General: He is not in acute distress.     Appearance: He is obese.   HENT:      Head: Normocephalic and atraumatic.   Eyes:      Extraocular Movements: Extraocular movements intact.   Cardiovascular:      Rate and Rhythm: Normal rate and regular rhythm.   Pulmonary:      Effort: Pulmonary effort is normal.      Breath sounds: Wheezing (inspiratory & expiratory- improved from previous exam) present.   Abdominal:      Palpations: Abdomen is soft.      Tenderness: There is no abdominal tenderness.   Skin:     General: Skin is warm and dry.   Neurological:      Mental Status: He is alert. Mental status is at baseline.   Psychiatric:         Mood and Affect: Mood normal.                ----------------------------------------------------------------------------------------------------------------------  Tele:      ----------------------------------------------------------------------------------------------------------------------  Results from last 7 days   Lab Units 08/07/24  0113   PROBNP pg/mL 332.6     Results from last 7 days   Lab Units 08/08/24  0114 08/08/24  0113 08/07/24  0113 08/06/24  0320   CRP mg/dL  --  10.00* 13.52* 19.34*   WBC 10*3/mm3 15.36*  --  17.34* 15.60*   HEMOGLOBIN g/dL 13.1  --  12.8* 12.5*   HEMATOCRIT % 39.5  --  40.4 38.5  "  MCV fL 99.7*  --  102.0* 99.0*   MCHC g/dL 33.2  --  31.7 32.5   PLATELETS 10*3/mm3 393  --  320 297     Results from last 7 days   Lab Units 08/07/24  1028   PH, ARTERIAL pH units 7.441   PO2 ART mm Hg 63.0*   PCO2, ARTERIAL mm Hg 28.9*   HCO3 ART mmol/L 19.7*     Results from last 7 days   Lab Units 08/08/24  0113 08/05/24  0235 08/04/24  0032   SODIUM mmol/L 136 137 135*   POTASSIUM mmol/L 4.3 4.0 3.9   MAGNESIUM mg/dL  --   --  1.8   CHLORIDE mmol/L 106 107 102   CO2 mmol/L 17.1* 20.6* 21.2*   BUN mg/dL 16 10 16   CREATININE mg/dL 0.85 0.82 1.10   CALCIUM mg/dL 9.2 9.2 8.4*   GLUCOSE mg/dL 175* 120* 112*   Estimated Creatinine Clearance: 123.8 mL/min (by C-G formula based on SCr of 0.85 mg/dL).  No results found for: \"AMMONIA\"      No results found for: \"BLOODCX\"  No results found for: \"URINECX\"  No results found for: \"WOUNDCX\"  No results found for: \"STOOLCX\"  ----------------------------------------------------------------------------------------------------------------------  Imaging Results (Last 24 Hours)       Procedure Component Value Units Date/Time    XR Abdomen 2+ VW with Chest 1 VW [[id number]] Collected: 08/07/24 1241     Updated: 08/07/24 1244    Narrative:      EXAM:    XR Abdomen, 2 Views and XR Chest, 1 View     EXAM DATE:    8/7/2024 12:27 PM     CLINICAL HISTORY:    cough; 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     COMPARISON:    No relevant prior studies available.     FINDINGS:    Lungs and pleural spaces:  Right upper lobe airspace disease.  Left  lower lobe airspace disease.  No consolidation.  No pneumothorax.    Heart:  Unremarkable as visualized.  No cardiomegaly.    Mediastinum:  Unremarkable as visualized.  Normal mediastinal contour.    Intraperitoneal space:  No free air.    Gastrointestinal tract:  Unremarkable as visualized.  No dilation.    Bones/joints:  " Unremarkable as visualized.  No acute fracture.    Tubes, lines and devices:  Bilateral ureteral stents.    Other findings:  Midline laparotomy changes noted.       Impression:      1.  Mild bilateral airspace disease.  2.  Postsurgical changes abdomen.  3. Nonobstructive bowel gas pattern.        This report was finalized on 8/7/2024 12:42 PM by Dr. Behzad Pickett MD.             ----------------------------------------------------------------------------------------------------------------------   I have reviewed the above laboratory values for 08/08/24    Assessment/Plan     Active Hospital Problems    Diagnosis  POA    **Colovesical fistula [N32.1]  Unknown    Status post colon resection [Z90.49]  Not Applicable         ASSESSMENT/PLAN:    Acute COPD exacerbation  Suspect acute bronchitis  Chronic HFpEF, possible mild decompensation  On further evaluation yesterday ABG confirmed patient requiring 4 L currently.  Chest x-ray nonspecific, Pro-Dmitry negative.  BNP WNL though continuing to hold further fluid replacement for now in the setting of HFpEF.  A.m. labs showed improving CRP and white blood cell count, BMP is stable.  Continue steroids, Rocephin and doxycycline for acute COPD exacerbation and suspected acute bronchitis.  Scheduled DuoNebs  Respiratory status appears to be stable, improving, though still requiring 4 L per nasal cannula with SpO2 92 to 95%.  Wean as able.  Respiratory culture thus far showing moderate growth of normal respiratory daryn.    Colovesical fistula s/p sigmoid resection, colostomy, and ureteral stent placement  Continue management per primary    CAD   Diabetes mellitus   HLD  HTN  Home regimen continued per primary.   Continue to monitor BP trend  Glucose trend overall 120-160.  Continue to monitor for now.     Thank you for the consult and allowing us to participate in the care of your patient, hospital medicine will continue to follow. Please do not hesitate to call with any  questions or concerns.     Nick Morrell PA-C  08/08/24  10:09 EDT

## 2024-08-08 NOTE — CASE MANAGEMENT/SOCIAL WORK
Continued Stay Note  [facility]     Patient Name: Celestine Arriaga  MRN: [id number]  Today's Date: 8/8/2024    Admit Date: 8/2/2024     Discharge Plan       Row Name 08/08/24 1305       Plan    Plan CM f/u for d/c planning. Pt continues on O2@4L. Pt lives at home alone and plans to return. Pt does not use DME but has RW and rollator. He may need oxygen and HH and does not have a prefernce for providers. Urology plans d/c home with F/C and f/u outpt. Pt's friend will transport. CM will continue to follow.    Patient/Family in Agreement with Plan yes             Dixie Rose RN

## 2024-08-08 NOTE — NURSING NOTE
REBECCA drain to LLQ.  Dressing with dried serosanguineous drainage.  Suture remains in place.  Cleansed marshall area with cleansing foam.  Large bandaide cut to fit around drain site.    Horizontal midline abdominal incision with intact staples noted.  Distal incision with white pen adelia drain covered by island dressing.  Dressing loose.  Dried bloody drainage noted.  Cleansed drain site and marshall-incision skin with cleansing foam and patted dry.  Small amount of serous drainage noted from drain.  Covered drain site with a silicone bordered dressing.  Order placed to change dressing daily.    Colostomy noted to RUQ.  Appliance removed.  Stoma round, moist and protruding above skin level.  Stoma noted to be darkly discolored but this is beginning to slough off around the edges.  Peristomal skin pink and intact with slight excoriation noted.  Patient visualized stoma.    Cleansed with cleansing foam.  Applied 3 layers of skin protectant barrier spray to peristomal skin.  Applied a thin line of stoma paste to stoma edge.  Patient placed in a 2-piece 70mm Tyler appliance.      Instructed patient on changing his stomal appliance and providing care to his marshall stomal skin.  Discussed bathing with a colostomy.  Patient verbalized understanding. Left appliance with patient to practice opening and closing bag and applying bag to flange.         08/08/24 1005   Wound 08/02/24 1253 Left midline abdomen Incision   Placement Date/Time: 08/02/24 1253   Present on Original Admission: No  Side: Left  Orientation: midline  Location: abdomen  Primary Wound Type: Incision   Dressing Appearance dried drainage;dressing loose   Closure Staples   Base other (see comments)  (Incision line with intact anisa.  White dreain noted to distal incision.)   Periwound intact   Periwound Temperature warm   Periwound Skin Turgor soft   Edges irregular   Drainage Characteristics/Odor serous   Drainage Amount small   Care, Wound cleansed with;soap and  "water   Dressing Care dressing changed   Closed/Suction Drain Medial LLQ Bulb 10 Fr.   Placement date: If unknown, DO NOT use \"Add Comment\" note/Placement time: If unknown, DO NOT use \"Add Comment\" note: 08/02/24 1524   Inserted by: dr cloud  Hand Hygiene Completed: Yes  Orientation: Medial  Location: LLQ  Drain Tube Type: Bulb  Size (...   Site Description Scabbed;Other (Comment)  (scabbing noted to edges)   Dressing Status Old drainage;Removed   Drainage Appearance Serosanguineous   Status To bulb suction   Colostomy RUQ   Placement date: If unknown, DO NOT use \"Add Comment\" note/Placement time: If unknown, DO NOT use \"Add Comment\" note: 08/02/24 1424   Inserted by: Dr. Cloud  Colostomy Type: Loop  Location: RUQ   Stomal Appliance 2 piece;Leaking   Stoma Appearance round;black;bridge in place   Peristomal Assessment Pink;Excoriation   Accessories/Skin Care cleansed with soap and water;barrier substance over peristomal skin   Stoma Function stool   Stool Color brown   Stool Consistency loose   Treatment Site care;Bag change       "

## 2024-08-08 NOTE — PLAN OF CARE
Goal Outcome Evaluation:              Pt resting in bed, VSS currently on 2L NC. Pts REBECCA drain removed per orders. Pt refused to leave today, stated he will go home tomorrow, MD was made aware. Pt voices no concerns at this time, will continue with POC.

## 2024-08-09 ENCOUNTER — READMISSION MANAGEMENT (OUTPATIENT)
Dept: CALL CENTER | Facility: HOSPITAL | Age: 59
End: 2024-08-09
Payer: COMMERCIAL

## 2024-08-09 VITALS
OXYGEN SATURATION: 94 % | SYSTOLIC BLOOD PRESSURE: 152 MMHG | HEIGHT: 70 IN | TEMPERATURE: 97.5 F | RESPIRATION RATE: 20 BRPM | HEART RATE: 70 BPM | DIASTOLIC BLOOD PRESSURE: 77 MMHG | WEIGHT: 254.9 LBS | BODY MASS INDEX: 36.49 KG/M2

## 2024-08-09 LAB
BACTERIA SPEC RESP CULT: NORMAL
BASOPHILS # BLD AUTO: 0.07 10*3/MM3 (ref 0–0.2)
BASOPHILS NFR BLD AUTO: 0.3 % (ref 0–1.5)
DEPRECATED RDW RBC AUTO: 56.1 FL (ref 37–54)
EOSINOPHIL # BLD AUTO: 0 10*3/MM3 (ref 0–0.4)
EOSINOPHIL NFR BLD AUTO: 0 % (ref 0.3–6.2)
ERYTHROCYTE [DISTWIDTH] IN BLOOD BY AUTOMATED COUNT: 15.1 % (ref 12.3–15.4)
GRAM STN SPEC: NORMAL
HCT VFR BLD AUTO: 39.6 % (ref 37.5–51)
HGB BLD-MCNC: 12.9 G/DL (ref 13–17.7)
IMM GRANULOCYTES # BLD AUTO: 0.5 10*3/MM3 (ref 0–0.05)
IMM GRANULOCYTES NFR BLD AUTO: 2.1 % (ref 0–0.5)
LYMPHOCYTES # BLD AUTO: 2.55 10*3/MM3 (ref 0.7–3.1)
LYMPHOCYTES NFR BLD AUTO: 10.9 % (ref 19.6–45.3)
MCH RBC QN AUTO: 32.6 PG (ref 26.6–33)
MCHC RBC AUTO-ENTMCNC: 32.6 G/DL (ref 31.5–35.7)
MCV RBC AUTO: 100 FL (ref 79–97)
MONOCYTES # BLD AUTO: 1.64 10*3/MM3 (ref 0.1–0.9)
MONOCYTES NFR BLD AUTO: 7 % (ref 5–12)
NEUTROPHILS NFR BLD AUTO: 18.62 10*3/MM3 (ref 1.7–7)
NEUTROPHILS NFR BLD AUTO: 79.7 % (ref 42.7–76)
NRBC BLD AUTO-RTO: 0 /100 WBC (ref 0–0.2)
PLATELET # BLD AUTO: 449 10*3/MM3 (ref 140–450)
PMV BLD AUTO: 10.8 FL (ref 6–12)
RBC # BLD AUTO: 3.96 10*6/MM3 (ref 4.14–5.8)
WBC NRBC COR # BLD AUTO: 23.38 10*3/MM3 (ref 3.4–10.8)

## 2024-08-09 PROCEDURE — 94799 UNLISTED PULMONARY SVC/PX: CPT

## 2024-08-09 PROCEDURE — 25010000002 HEPARIN (PORCINE) PER 1000 UNITS: Performed by: SURGERY

## 2024-08-09 PROCEDURE — 85025 COMPLETE CBC W/AUTO DIFF WBC: CPT

## 2024-08-09 PROCEDURE — 99024 POSTOP FOLLOW-UP VISIT: CPT | Performed by: SURGERY

## 2024-08-09 PROCEDURE — 99232 SBSQ HOSP IP/OBS MODERATE 35: CPT

## 2024-08-09 PROCEDURE — 25010000002 METHYLPREDNISOLONE PER 40 MG: Performed by: INTERNAL MEDICINE

## 2024-08-09 PROCEDURE — 94664 DEMO&/EVAL PT USE INHALER: CPT

## 2024-08-09 PROCEDURE — 94761 N-INVAS EAR/PLS OXIMETRY MLT: CPT

## 2024-08-09 RX ORDER — CALCIUM CARBONATE 500 MG/1
2 TABLET, CHEWABLE ORAL 2 TIMES DAILY PRN
Status: DISCONTINUED | OUTPATIENT
Start: 2024-08-09 | End: 2024-08-09 | Stop reason: HOSPADM

## 2024-08-09 RX ADMIN — ACETAMINOPHEN 1000 MG: 500 TABLET ORAL at 03:27

## 2024-08-09 RX ADMIN — AMLODIPINE BESYLATE 10 MG: 10 TABLET ORAL at 08:42

## 2024-08-09 RX ADMIN — Medication 10 ML: at 08:44

## 2024-08-09 RX ADMIN — METOPROLOL TARTRATE 25 MG: 25 TABLET, FILM COATED ORAL at 08:42

## 2024-08-09 RX ADMIN — HEPARIN SODIUM 5000 UNITS: 5000 INJECTION INTRAVENOUS; SUBCUTANEOUS at 05:33

## 2024-08-09 RX ADMIN — METHOCARBAMOL 750 MG: 750 TABLET, FILM COATED ORAL at 11:39

## 2024-08-09 RX ADMIN — SENNOSIDES AND DOCUSATE SODIUM 2 TABLET: 50; 8.6 TABLET ORAL at 08:42

## 2024-08-09 RX ADMIN — IPRATROPIUM BROMIDE AND ALBUTEROL SULFATE 3 ML: .5; 3 SOLUTION RESPIRATORY (INHALATION) at 00:18

## 2024-08-09 RX ADMIN — SERTRALINE 100 MG: 50 TABLET, FILM COATED ORAL at 08:42

## 2024-08-09 RX ADMIN — METHOCARBAMOL 750 MG: 750 TABLET, FILM COATED ORAL at 08:41

## 2024-08-09 RX ADMIN — METHYLPREDNISOLONE SODIUM SUCCINATE 40 MG: 40 INJECTION, POWDER, FOR SOLUTION INTRAMUSCULAR; INTRAVENOUS at 03:28

## 2024-08-09 RX ADMIN — ANTACID TABLETS 2 TABLET: 500 TABLET, CHEWABLE ORAL at 01:05

## 2024-08-09 RX ADMIN — IPRATROPIUM BROMIDE AND ALBUTEROL SULFATE 3 ML: .5; 3 SOLUTION RESPIRATORY (INHALATION) at 07:23

## 2024-08-09 RX ADMIN — HYDRALAZINE HYDROCHLORIDE 50 MG: 50 TABLET ORAL at 08:41

## 2024-08-09 RX ADMIN — ACETAMINOPHEN 1000 MG: 500 TABLET ORAL at 11:40

## 2024-08-09 RX ADMIN — DOXYCYCLINE 100 MG: 100 INJECTION, POWDER, LYOPHILIZED, FOR SOLUTION INTRAVENOUS at 05:33

## 2024-08-09 RX ADMIN — IPRATROPIUM BROMIDE AND ALBUTEROL SULFATE 3 ML: .5; 3 SOLUTION RESPIRATORY (INHALATION) at 13:28

## 2024-08-09 NOTE — DISCHARGE PLACEMENT REQUEST
"Celestine Cook (58 y.o. Male)       Date of Birth   1965    Social Security Number       Address   [address] [id number]    Home Phone       MRN   [id number]       Sabianism   None    Marital Status                               Admission Date   24    Admission Type   Elective    Admitting Provider   Boogie Hays MD    Attending Provider   Boogie Hays MD    Department, Room/Bed   [address], 3338/1P       Discharge Date       [place] Dis[place] [facility] [place]                                 Attending Provider: Boogie Hays MD    Allergies: Lisinopril    Isolation: None   Infection: None   Code Status: CPR    Ht: 177.8 cm (70\")   Wt: 116 kg (254 lb 14.4 oz)    Admission Cmt: None   Principal Problem: Colovesical fistula [N32.1]                   Active Insurance as of 2024       Primary Coverage       Payor Plan Insurance Group Employer/Plan Group    [employer]OSS [employer]OSS BLUE SHIELD PPO [id number]       Payor Plan Address Payor Plan Phone Number Payor Plan Fax Number Effective Dates    [address] [phone]  2024 - None Entered    [facility] [id number]         Subscriber Name Subscriber Birth Date Member ID       CELESTINE COOK 1965 [id number]                     Emergency Contacts        (Rel.) Home Phone Work Phone Mobile Phone    DANIELA COOK (Relative) [phone] -- --    Mike Campos (Relative) -- -- [phone]          [address] [address]  Phone:  [phone]  Fax:  [phone] Date: Aug 9, 2024      Ambulatory Referral to Home Health     Patient:  Celestine Cook MRN:  [id number]   [address] [facility] [address] :  1965  SSN:    Phone: [phone] Sex:  M      INSURANCE PAYOR PLAN GROUP # SUBSCRIBER ID   Primary:    KENTON CHAIDEZ CROSS [id number]      Referring Provider " Information:  LUCY HAYS Phone: [phone] Fax: [phone]       Referral Information:   # Visits:  999 Referral Type: Home Health [42]   Urgency:  Routine Referral Reason: Specialty Services Required   Start Date: Aug 9, 2024 End Date:  To be determined by Insurer   Diagnosis: Colovesical fistula (N32.1 [ICD-10-CM] 596.1 [ICD-9-CM])  Status post colon resection (Z90.49 [ICD-10-CM] V45.89 [ICD-9-CM])      Refer to Dept:   Refer to Provider:   Refer to Provider Phone:   Refer to Facility:       Face to Face Visit Date: 8/9/2024  Follow-up provider for Plan of Care? I treated the patient in an acute care facility and will not continue treatment after discharge.  Follow-up provider: KIRTI AYON [[id number]]  Reason/Clinical Findings: new colostomy and home oxygen  Describe mobility limitations that make leaving home difficult: weakness  Nursing/Therapeutic Services Requested: Skilled Nursing  Skilled nursing orders: O2 instruction  Skilled nursing orders: Ostomy instruction  Frequency: 1 Week 1     This document serves as a request of services and does not constitute Insurance authorization or approval of services.  To determine eligibility, please contact the members Insurance carrier to verify and review coverage.     If you have medical questions regarding this request for services. Please contact [address] at [phone] during normal business hours.        Verbal Order Mode: Telephone with readback   Authorizing Provider: Lucy Hays MD  Authorizing Provider's NPI: [id number]     Order Entered By: Saloni Mcclellan RN 8/9/2024 11:58 AM     Electronically signed by:            History & Physical        Lucy Hays MD at 08/02/24 0924            H&P reviewed. The patient was examined and there are no changes to the H&P.          Electronically signed by Lucy Hays MD at 08/02/24 0924   Source Note: H&P (View-Only)          Subjective  Celestine Arriaga is a 58 y.o.  male.     Chief Complaint: colovesical fistula    History of Present Illness He is a 57 yo who has had UTIs over the last year and had what appeared to be  colovesical fistula on cystoscopy. He had a colonoscopy but the scope would not pass about 40 cm. No tumor or acute inflammation was seen in the colon that could be seen.     The following portions of the patient's history were reviewed and updated as appropriate: current medications, past family history, past medical history, past social history, past surgical history and problem list.    Review of Systems   Constitutional:  Negative for activity change, appetite change, chills, fever and unexpected weight change.   HENT:  Negative for congestion, facial swelling and sore throat.    Eyes:  Negative for photophobia and visual disturbance.   Respiratory:  Negative for chest tightness, shortness of breath and wheezing.    Cardiovascular:  Negative for chest pain, palpitations and leg swelling.   Gastrointestinal:  Positive for abdominal pain. Negative for abdominal distention, anal bleeding, blood in stool, constipation, diarrhea, nausea, rectal pain and vomiting.   Endocrine: Negative for cold intolerance, heat intolerance, polydipsia and polyuria.   Genitourinary:  Positive for dysuria and urgency. Negative for difficulty urinating and flank pain.   Musculoskeletal:  Negative for back pain and myalgias.   Skin:  Negative for rash and wound.   Allergic/Immunologic: Negative for immunocompromised state.   Neurological:  Negative for dizziness, seizures, syncope, light-headedness, numbness and headaches.   Hematological:  Negative for adenopathy. Does not bruise/bleed easily.   Psychiatric/Behavioral:  Negative for behavioral problems and confusion. The patient is not nervous/anxious.        Objective  Physical Exam  Vitals reviewed.   Constitutional:       General: He is not in acute distress.     Appearance: He is well-developed. He is not ill-appearing.   HENT:       Head: Normocephalic. No laceration. Hair is normal.      Right Ear: Hearing and ear canal normal.      Left Ear: Hearing and ear canal normal.      Nose: Nose normal.      Right Sinus: No maxillary sinus tenderness or frontal sinus tenderness.      Left Sinus: No maxillary sinus tenderness or frontal sinus tenderness.   Eyes:      General: Lids are normal.      Conjunctiva/sclera: Conjunctivae normal.      Pupils: Pupils are equal, round, and reactive to light.   Neck:      Thyroid: No thyroid mass or thyromegaly.      Vascular: No JVD.      Trachea: No tracheal tenderness or tracheal deviation.   Cardiovascular:      Rate and Rhythm: Normal rate and regular rhythm.      Heart sounds: No murmur heard.     No gallop.   Pulmonary:      Effort: Pulmonary effort is normal.      Breath sounds: Normal breath sounds. No stridor. No wheezing.   Chest:      Chest wall: No tenderness.   Abdominal:      General: Bowel sounds are normal. There is no distension.      Palpations: Abdomen is soft. There is no mass.      Tenderness: There is abdominal tenderness. There is no guarding or rebound.      Hernia: No hernia is present.   Musculoskeletal:         General: No deformity.      Cervical back: Normal range of motion.   Lymphadenopathy:      Cervical: No cervical adenopathy.      Upper Body:      Right upper body: No supraclavicular adenopathy.      Left upper body: No supraclavicular adenopathy.   Skin:     General: Skin is warm and dry.      Coloration: Skin is not pale.      Findings: No erythema or rash.   Neurological:      Mental Status: He is alert and oriented to person, place, and time.      Motor: No abnormal muscle tone.   Psychiatric:         Behavior: Behavior normal.         Thought Content: Thought content normal.         Past Medical History:   Diagnosis Date    Arthritis     COPD (chronic obstructive pulmonary disease)     Coronary artery disease     Diabetes mellitus     Elevated cholesterol     GERD  (gastroesophageal reflux disease)     Hypertension     Urinary tract infection        Family History   Problem Relation Age of Onset    No Known Problems Father     Cancer Mother        Social History     Tobacco Use    Smoking status: Every Day     Current packs/day: 1.50     Average packs/day: 1.5 packs/day for 40.6 years (60.8 ttl pk-yrs)     Types: Cigarettes     Start date: 1984     Passive exposure: Current    Smokeless tobacco: Never   Vaping Use    Vaping status: Never Used   Substance Use Topics    Alcohol use: Yes     Alcohol/week: 3.0 standard drinks of alcohol     Types: 3 Cans of beer per week     Comment: 2-3 beers a night    Drug use: Never       Past Surgical History:   Procedure Laterality Date    BACK SURGERY      CAROTID STENT      COLONOSCOPY      COLONOSCOPY N/A 7/18/2024    Procedure: COLONOSCOPY;  Surgeon: Boogie Hays MD;  Location: [facility];  Service: Gastroenterology;  Laterality: N/A;    CORONARY ANGIOPLASTY WITH STENT PLACEMENT         Current Outpatient Medications   Medication Instructions    amLODIPine (NORVASC) 10 mg, Oral, Daily    carvedilol (COREG) 25 mg, Oral, 2 Times Daily With Meals    hydrALAZINE (APRESOLINE) 50 mg, Oral, 3 Times Daily    methocarbamol (ROBAXIN) 500 MG tablet 1 tablet, Oral, 3 times daily    ondansetron (ZOFRAN) 4 mg, Oral, Every 12 Hours PRN    Ozempic, 0.25 or 0.5 MG/DOSE, 2 MG/3ML solution pen-injector INJECT 0.25 MG  SUBCUTANEOUSLY ONCE A WEEK    sertraline (ZOLOFT) 100 mg, Oral, Daily    sodium-potassium-magnesium sulfates (Suprep Bowel Prep Kit) 17.5-3.13-1.6 GM/177ML solution oral solution Dispense one Kit        Sig: Used as Directed         Assessment & Plan  Diagnoses and all orders for this visit:    1. Colovesical fistula (Primary)    He has been scheduled for a sigmoid resection and bladder repair in conjunction with urology.             This document has been electronically signed by Boogie Hays MD   July 25, 2024 13:29  EDT    Electronically signed by Boogie Hays MD at 07/25/24 1403                 Boogie Hays MD at 07/25/24 1340          Subjective  Celestine Arriaga is a 58 y.o. male.     Chief Complaint: colovesical fistula    History of Present Illness He is a 57 yo who has had UTIs over the last year and had what appeared to be  colovesical fistula on cystoscopy. He had a colonoscopy but the scope would not pass about 40 cm. No tumor or acute inflammation was seen in the colon that could be seen.     The following portions of the patient's history were reviewed and updated as appropriate: current medications, past family history, past medical history, past social history, past surgical history and problem list.    Review of Systems   Constitutional:  Negative for activity change, appetite change, chills, fever and unexpected weight change.   HENT:  Negative for congestion, facial swelling and sore throat.    Eyes:  Negative for photophobia and visual disturbance.   Respiratory:  Negative for chest tightness, shortness of breath and wheezing.    Cardiovascular:  Negative for chest pain, palpitations and leg swelling.   Gastrointestinal:  Positive for abdominal pain. Negative for abdominal distention, anal bleeding, blood in stool, constipation, diarrhea, nausea, rectal pain and vomiting.   Endocrine: Negative for cold intolerance, heat intolerance, polydipsia and polyuria.   Genitourinary:  Positive for dysuria and urgency. Negative for difficulty urinating and flank pain.   Musculoskeletal:  Negative for back pain and myalgias.   Skin:  Negative for rash and wound.   Allergic/Immunologic: Negative for immunocompromised state.   Neurological:  Negative for dizziness, seizures, syncope, light-headedness, numbness and headaches.   Hematological:  Negative for adenopathy. Does not bruise/bleed easily.   Psychiatric/Behavioral:  Negative for behavioral problems and confusion. The patient is not nervous/anxious.         Objective  Physical Exam  Vitals reviewed.   Constitutional:       General: He is not in acute distress.     Appearance: He is well-developed. He is not ill-appearing.   HENT:      Head: Normocephalic. No laceration. Hair is normal.      Right Ear: Hearing and ear canal normal.      Left Ear: Hearing and ear canal normal.      Nose: Nose normal.      Right Sinus: No maxillary sinus tenderness or frontal sinus tenderness.      Left Sinus: No maxillary sinus tenderness or frontal sinus tenderness.   Eyes:      General: Lids are normal.      Conjunctiva/sclera: Conjunctivae normal.      Pupils: Pupils are equal, round, and reactive to light.   Neck:      Thyroid: No thyroid mass or thyromegaly.      Vascular: No JVD.      Trachea: No tracheal tenderness or tracheal deviation.   Cardiovascular:      Rate and Rhythm: Normal rate and regular rhythm.      Heart sounds: No murmur heard.     No gallop.   Pulmonary:      Effort: Pulmonary effort is normal.      Breath sounds: Normal breath sounds. No stridor. No wheezing.   Chest:      Chest wall: No tenderness.   Abdominal:      General: Bowel sounds are normal. There is no distension.      Palpations: Abdomen is soft. There is no mass.      Tenderness: There is abdominal tenderness. There is no guarding or rebound.      Hernia: No hernia is present.   Musculoskeletal:         General: No deformity.      Cervical back: Normal range of motion.   Lymphadenopathy:      Cervical: No cervical adenopathy.      Upper Body:      Right upper body: No supraclavicular adenopathy.      Left upper body: No supraclavicular adenopathy.   Skin:     General: Skin is warm and dry.      Coloration: Skin is not pale.      Findings: No erythema or rash.   Neurological:      Mental Status: He is alert and oriented to person, place, and time.      Motor: No abnormal muscle tone.   Psychiatric:         Behavior: Behavior normal.         Thought Content: Thought content normal.         Past  Medical History:   Diagnosis Date    Arthritis     COPD (chronic obstructive pulmonary disease)     Coronary artery disease     Diabetes mellitus     Elevated cholesterol     GERD (gastroesophageal reflux disease)     Hypertension     Urinary tract infection        Family History   Problem Relation Age of Onset    No Known Problems Father     Cancer Mother        Social History     Tobacco Use    Smoking status: Every Day     Current packs/day: 1.50     Average packs/day: 1.5 packs/day for 40.6 years (60.8 ttl pk-yrs)     Types: Cigarettes     Start date: 1984     Passive exposure: Current    Smokeless tobacco: Never   Vaping Use    Vaping status: Never Used   Substance Use Topics    Alcohol use: Yes     Alcohol/week: 3.0 standard drinks of alcohol     Types: 3 Cans of beer per week     Comment: 2-3 beers a night    Drug use: Never       Past Surgical History:   Procedure Laterality Date    BACK SURGERY      CAROTID STENT      COLONOSCOPY      COLONOSCOPY N/A 7/18/2024    Procedure: COLONOSCOPY;  Surgeon: Boogie Hays MD;  Location: [facility];  Service: Gastroenterology;  Laterality: N/A;    CORONARY ANGIOPLASTY WITH STENT PLACEMENT         Current Outpatient Medications   Medication Instructions    amLODIPine (NORVASC) 10 mg, Oral, Daily    carvedilol (COREG) 25 mg, Oral, 2 Times Daily With Meals    hydrALAZINE (APRESOLINE) 50 mg, Oral, 3 Times Daily    methocarbamol (ROBAXIN) 500 MG tablet 1 tablet, Oral, 3 times daily    ondansetron (ZOFRAN) 4 mg, Oral, Every 12 Hours PRN    Ozempic, 0.25 or 0.5 MG/DOSE, 2 MG/3ML solution pen-injector INJECT 0.25 MG  SUBCUTANEOUSLY ONCE A WEEK    sertraline (ZOLOFT) 100 mg, Oral, Daily    sodium-potassium-magnesium sulfates (Suprep Bowel Prep Kit) 17.5-3.13-1.6 GM/177ML solution oral solution Dispense one Kit        Sig: Used as Directed         Assessment & Plan  Diagnoses and all orders for this visit:    1. Colovesical fistula (Primary)    He has been scheduled for a  sigmoid resection and bladder repair in conjunction with urology.             This document has been electronically signed by Boogie Hays MD   July 25, 2024 13:29 EDT    Electronically signed by Boogie Hays MD at 07/25/24 1403          Operative/Procedure Notes (most recent note)        Boogie Hays MD at 08/02/24 1232          COLON RESECTION SIGMOID, CYSTOSCOPY URETERAL CATHETER/STENT INSERTION, APPENDECTOMY, COLOSTOMY LOOP  Procedure Note    Celestine Arriaga  8/2/2024    Pre-op Diagnosis:   Colovesical fistula [N32.1]    Post-op Diagnosis:  same       Procedure(s):  COLON RESECTION SIGMOID  URETEROSCOPY URETERAL CATHETER/STENT INSERTION  APPENDECTOMY  COLOSTOMY LOOP    Surgeon(s):  Boogie Hays MD Steidle, Christopher P, MD    Anesthesia: General, General with Block    Staff:   Circulator: Chata Mahoney, NICOLASA; Little Bailey RN; Janet Hankins RN  Radiology Technologist: Heriberto Joaquin, RT  Scrub Person: Kiarra Olivia LPN; Kenneth oJseph; Marisa Campos  Assistant: Eliana Zuñiga    Estimated Blood Loss:  600 cc    Specimens:                Order Name Source Comment Collection Info Order Time   TISSUE EXAM, [facility] (INGRID, COR, MAD) Large Intestine, Appendix  Collected By: Boogie Hays MD 8/2/2024  1:32 PM     Release to patient   Routine Release              Drains:   Open Drain Inferior;Midline (Active)       Colostomy RUQ (Active)       Urethral Catheter (Active)       Ureteral Drain/Stent Left ureter 5 Fr. (Active)   Site Assessment Clean;Intact 08/02/24 1301       Ureteral Drain/Stent Right ureter 5 Fr. (Active)   Site Assessment Clean;Intact 08/02/24 1303       [REMOVED] NG/OG Tube Orogastric 18 Fr Center mouth (Removed)       Procedure: The patient initially had ureteral stents placed by Dr Salazar. He then was placed in low stirrups and the perineum and abdomen prepped and draped. The incision was made in the lower abdomen. The subcutaneous tissue  and fascia incised with cautery and the peritoneum opened. A  Merritt O retractor placed. There was omentum adherent to the sigmoid that was mobilized with cautery. Then the sigmoid had to be mobilized off the the left wall of the pelvis with cautery as well as to the posterior wall of the pelvis. The adhesions were very dense and tissue planes were not identifiable. The sigmoid was slowly mobilized down in to the pelvis and off the bladder. I could not identify a hole in the bladder, and the left ureter was encased in such hard fibrotic tissue it was not possible to safely dissect it out. Eventually matted fibrotic loops of sigmoid were free enough to fire a STACY stapler on the proximal end of the scarring and a curved cutting stapler below the area that had been adherent to the bladder. A EEA sizer was placed in the rectum and did pass up the rectal stump. So the proximal end was trimmed off and a 29 EEA stapler anvil placed with a purse string of prolene. The stapler was inserted in the rectum and connected at the stump level. It was connected and fired. Because of the thin wall of the rectal stump, it was decided to make a loop colosotmy in the transverse colon. The abdomen was irrigated and a REBECCA drain placed in the pelvis and out the LLQ. It was sutured in with a silk suture. The transverse colon was cleaned off some with cautery and ostomy site made in the RUQ. A penrose drain was used to pull up the loop and a colostomy mir left to hold it in place. The midline fascia was closed with a running looped PDS suture. Zinrelief was placed in the fascia while closing. A penrose drain was placed in the base of the wound coming out the lower end. It was sutured in with a vicryl suture. The skin was closed with staples. The colostomy was then matured with vicryl sutures.     Findings:      This procedure was not performed to treat colon cancer through resection         Complications:     Grafts / Implants N/A    Boogie CHAMORRO  MD Lis     Date: 8/2/2024  Time: 15:02 EDT    Electronically signed by Boogie Hays MD at 08/02/24 1514          Physician Progress Notes (most recent note)        Boogie Hays MD at 08/09/24 1110          POD#6 colecotomy    He did not have a ride yesterday so he stayed overnight. He says he is feeling ok today and can get a ride. Afeb and vss. Abdomen soft and ostomy working. Wound looks good and his lungs are sounding better. He is to go home with O2 and steroids per the hospitalist. WBC elevation is likely due to the steroids.    Electronically signed by Boogie Hays MD at 08/09/24 1112          Consult Notes (most recent note)        Nick Morrell PA-C at 08/07/24 0947        Consult Orders    1. Inpatient Hospitalist Consult [[id number]] ordered by Boogie Hays MD              Attestation signed by Maddy Dunn DO at 08/07/24 1601    I have reviewed this documentation and agree.  Patient is seen and examined at bedside.  States that he has not used supplemental oxygen at home in quite some time.  Reports worsening cough compared to baseline.  Reports cough is productive and also reports shortness of air that has been present and progressive in the postoperative setting.    On exam, the patient has bilateral inspiratory and expiratory wheezing with occasional bilateral rhonchi.  No rales.  Heart is regular rate and rhythm without obvious murmur.  Abdomen is soft, distended with normal active bowel sounds.  Benton catheter in place with dark appearing urine and sediment.      Patient's proBNP and procalcitonin levels are within normal limits.  Chest x-ray with mild bilateral airspace disease; nonspecific.    -Suspect patient has an acute COPD exacerbation with possibly some minor volume overload    Continue with supplemental oxygen and titrate for saturations 90 to 95%.  Continue to hold IV fluids if okay with primary team.  Encouraged incentive spirometry use.  Begin IV  Solu-Medrol for treatment of a COPD exacerbation.  Continue scheduled nebulized inhalants.  Obtain a respiratory culture and begin IV antibiotic therapy for treatment of a suspected acute bronchitis.                         [facility] Medicine Services  CONSULT NOTE     Inpatient Hospitalist Consult  Consult performed by: Nick Morrell PA-C  Consult ordered by: Boogie Hays MD        Patient Identification:  Name:  Celestine Arriaga  Age:  58 y.o.  Sex:  male  :  1965  MRN:  [id number]  Visit Number:  [id number]  Primary care provider:  Rachell Rivers APRN    Length of stay:  5    Subjective     Chief Complaint:  No chief complaint on file.      History of presenting illness:     Celestine Arriaga is a 58 y.o. male admitted by general surgery secondary to colovesical fistula.  He had sigmoid colon resection, appendectomy and cystoscopy with ureteral stent placement.  PMHx significant for CHF, COPD, CAD, diabetes mellitus, hyperlipidemia, GERD, hypertension     Hospital medicine was consulted for cough, COPD.    Most recent vital signs temp 97.9, heart rate 64, respirations 19, /96, SpO2 96% on 4 L per nasal cannula.  A.m. labs show CRP decreased from previous, white blood cell count is up from 15.6-17.34 overnight.  ABG and CXR pending.    Patient was seen and examined on 3N sitting up in bedside chair. He appeared comfortable on 4L nc. He reports shortness of breath persisting since waking up from anesthesia but maybe somewhat improved over the last day or so. He does have a productive cough- clear sputum. No subjective fevers. No reported nasal congestion or rhinorrhea. He is a smoker with diagnosis of COPD but does not require supplemental O2 at baseline. Chart does also list CHF, EF in 10/23 on chart review was 50-55%. No overt swelling that he has noticed or on exam. He reports tolerating diet OK currently with occasional early AM nausea.       ---------------------------------------------------------------------------------------------------------------------  Review of Systems   Constitutional:  Negative for chills and fever.   HENT:  Negative for congestion and rhinorrhea.    Respiratory:  Positive for cough and shortness of breath.    Cardiovascular:  Negative for chest pain and leg swelling.   Gastrointestinal:  Positive for nausea.   Musculoskeletal:  Negative for arthralgias and myalgias.   Skin:  Negative for rash and wound.   Neurological:  Negative for dizziness and light-headedness.          ---------------------------------------------------------------------------------------------------------------------   Past History:  Past Medical History:   Diagnosis Date    Arthritis     Chest pain     CHF (congestive heart failure)     COPD (chronic obstructive pulmonary disease)     Coronary artery disease     Diabetes mellitus     Elevated cholesterol     GERD (gastroesophageal reflux disease)     [place] (hard of hearing)     Hypertension     MI (myocardial infarction)     cardiac dr told him he had had a mild attack    Neuropathy     LOWER LEGS/FEET    Urinary tract infection      Past Surgical History:   Procedure Laterality Date    APPENDECTOMY N/A 8/2/2024    Procedure: APPENDECTOMY;  Surgeon: Boogie Hays MD;  Location: [facility] OR;  Service: General;  Laterality: N/A;    BACK SURGERY      CARDIAC CATHETERIZATION      CARDIAC SURGERY      COLON RESECTION N/A 8/2/2024    Procedure: COLON RESECTION SIGMOID;  Surgeon: Boogie Hays MD;  Location: [facility] OR;  Service: General;  Laterality: N/A;    COLONOSCOPY      COLONOSCOPY N/A 07/18/2024    Procedure: COLONOSCOPY;  Surgeon: Boogie Hays MD;  Location: [facility] OR;  Service: Gastroenterology;  Laterality: N/A;    COLOSTOMY N/A 8/2/2024    Procedure: COLOSTOMY LOOP;  Surgeon: Boogie Hays MD;  Location: [facility] OR;  Service: General;  Laterality: N/A;    CORONARY ANGIOPLASTY WITH STENT  PLACEMENT      3 STENTS TOTAL    CYSTOSCOPY W/ URETERAL STENT PLACEMENT Bilateral 8/2/2024    Procedure: URETEROSCOPY URETERAL CATHETER/STENT INSERTION;  Surgeon: Boogie Hays MD;  Location: [facility];  Service: General;  Laterality: Bilateral;    EYE SURGERY Right     TOE AMPUTATION Left     great toe in  accident     Family History   Problem Relation Age of Onset    No Known Problems Father     Cancer Mother      Social History     Socioeconomic History    Marital status:    Tobacco Use    Smoking status: Every Day     Current packs/day: 1.50     Average packs/day: 1.5 packs/day for 40.6 years (60.9 ttl pk-yrs)     Types: Cigarettes     Start date: 1984     Passive exposure: Current    Smokeless tobacco: Former     Types: Chew   Vaping Use    Vaping status: Never Used   Substance and Sexual Activity    Alcohol use: Not Currently     Alcohol/week: 2.0 standard drinks of alcohol     Types: 2 Shots of liquor per week     Comment: 2 mixed drinks a night    Drug use: Never    Sexual activity: Defer     Birth control/protection: None     ---------------------------------------------------------------------------------------------------------------------   Allergies:  Lisinopril  ---------------------------------------------------------------------------------------------------------------------   Prior to Admission Medications       Prescriptions Last Dose Informant Patient Reported? Taking?    acetaminophen (TYLENOL) 500 MG tablet 8/1/2024 Self, Other Yes Yes    Take 1 tablet by mouth Every 6 (Six) Hours As Needed for Mild Pain.    albuterol sulfate  (90 Base) MCG/ACT inhaler 8/1/2024 Self, Other Yes Yes    Inhale 1 puff Every 6 (Six) Hours As Needed for Wheezing.    amLODIPine (NORVASC) 10 MG tablet 8/2/2024 Self, Other Yes Yes    Take 1 tablet by mouth Daily.    aspirin 81 MG EC tablet 8/1/2024 Self, Other Yes Yes    Take 1 tablet by mouth Daily.    hydrALAZINE (APRESOLINE) 50 MG tablet  8/1/2024 Self, Other Yes Yes    Take 1 tablet by mouth 3 (Three) Times a Day.    metoprolol tartrate (LOPRESSOR) 25 MG tablet 8/2/2024 Self, Other Yes Yes    Take 1 tablet by mouth 2 (Two) Times a Day.    nitroglycerin (NITROSTAT) 0.4 MG SL tablet Unknown Self, Other Yes No    Place 1 tablet under the tongue Every 5 (Five) Minutes As Needed for Chest Pain. Take no more than 3 doses in 15 minutes.    Ozempic, 0.25 or 0.5 MG/DOSE, 2 MG/3ML solution pen-injector 7/11/2024 Self, Other Yes No    INJECT 0.25 MG  SUBCUTANEOUSLY ONCE A WEEK    sertraline (ZOLOFT) 100 MG tablet 8/2/2024 Self, Other Yes Yes    Take 1 tablet by mouth Daily.          ---------------------------------------------------------------------------------------------------------------------     Objective      Procedures:    [facility] Meds:  acetaminophen, 1,000 mg, Oral, Q6H  amLODIPine, 10 mg, Oral, Daily  heparin (porcine), 5,000 Units, Subcutaneous, Q8H  hydrALAZINE, 50 mg, Oral, TID  ipratropium-albuterol, 3 mL, Nebulization, Q6H - RT  methocarbamol, 750 mg, Oral, 4x Daily  metoprolol tartrate, 25 mg, Oral, BID  senna-docusate sodium, 2 tablet, Oral, BID  sertraline, 100 mg, Oral, Daily  sodium chloride, 10 mL, Intravenous, Q12H      lactated ringers, 50 mL/hr, Last Rate: 50 mL/hr (08/07/24 3422)      ---------------------------------------------------------------------------------------------------------------------   Vital Signs:  Temp:  [97.5 °F (36.4 °C)-98.9 °F (37.2 °C)] 97.9 °F (36.6 °C)  Heart Rate:  [59-77] 64  Resp:  [19-22] 19  BP: (145-175)/(84-99) 165/96  Mean Arterial Pressure (Non-Invasive) for the past 24 hrs (Last 3 readings):   Noninvasive MAP (mmHg)   08/06/24 1128 123     SpO2 Percentage    08/07/24 0025 08/07/24 0600 08/07/24 0810   SpO2: 97% 94% 96%     SpO2:  [92 %-97 %] 96 %  on  Flow (L/min):  [4] 4;   Device (Oxygen Therapy): nasal cannula    Body mass index is 36.57 kg/m².  Wt Readings from Last 3 Encounters:   08/02/24  116 kg (254 lb 14.4 oz)   07/25/24 118 kg (260 lb)   07/18/24 118 kg (260 lb)        Intake/Output Summary (Last 24 hours) at 8/7/2024 0910  Last data filed at 8/7/2024 0540  Gross per 24 hour   Intake 1821.34 ml   Output 2345 ml   Net -523.66 ml     Diet: Gastrointestinal; Fiber-Restricted; Texture: Soft to Chew (NDD 3); Soft to Chew: Whole Meat; Fluid Consistency: Thin (IDDSI 0)  ---------------------------------------------------------------------------------------------------------------------   Physical exam:  Physical Exam  Vitals and nursing note reviewed.   Constitutional:       General: He is not in acute distress.  HENT:      Head: Normocephalic and atraumatic.   Eyes:      Extraocular Movements: Extraocular movements intact.   Cardiovascular:      Rate and Rhythm: Normal rate and regular rhythm.   Pulmonary:      Effort: Pulmonary effort is normal.      Breath sounds: Rhonchi (wet) present.   Musculoskeletal:      Right lower leg: No edema.      Left lower leg: No edema.   Skin:     General: Skin is warm and dry.   Neurological:      Mental Status: He is alert. Mental status is at baseline.   Psychiatric:         Mood and Affect: Mood normal.         Behavior: Behavior normal.       ---------------------------------------------------------------------------------------------------------------------   EKG:     ---------------------------------------------------------------------------------------------------------------------             Results from last 7 days   Lab Units 08/07/24  0113 08/06/24  0320 08/05/24  0235   CRP mg/dL 13.52* 19.34* 26.37*   WBC 10*3/mm3 17.34* 15.60* 17.32*   HEMOGLOBIN g/dL 12.8* 12.5* 11.6*   HEMATOCRIT % 40.4 38.5 36.0*   MCV fL 102.0* 99.0* 101.4*   MCHC g/dL 31.7 32.5 32.2   PLATELETS 10*3/mm3 320 297 256     Results from last 7 days   Lab Units 08/05/24  0235 08/04/24  0032 08/03/24  0218   SODIUM mmol/L 137 135* 139   POTASSIUM mmol/L 4.0 3.9 4.5   MAGNESIUM mg/dL  --   "1.8  --    CHLORIDE mmol/L 107 102 104   CO2 mmol/L 20.6* 21.2* 22.1   BUN mg/dL 10 16 18   CREATININE mg/dL 0.82 1.10 1.26   CALCIUM mg/dL 9.2 8.4* 8.6   GLUCOSE mg/dL 120* 112* 132*   Estimated Creatinine Clearance: 128.3 mL/min (by C-G formula based on SCr of 0.82 mg/dL).  No results found for: \"AMMONIA\"    No results found for: \"HGBA1C\", \"POCGLU\"  Lab Results   Component Value Date    HGBA1C 5.4 11/22/2015     No results found for: \"TSH\", \"FREET4\"    No results found for: \"BLOODCX\"  No results found for: \"URINECX\"  No results found for: \"WOUNDCX\"  No results found for: \"STOOLCX\"  No results found for: \"RESPCX\"  Pain Management Panel           No data to display              I have personally reviewed the above laboratory results.   ---------------------------------------------------------------------------------------------------------------------  Imaging Results (Last 7 Days)       Procedure Component Value Units Date/Time    XR Chest 1 View [[id number]] Collected: 08/03/24 1947     Updated: 08/03/24 1950    Narrative:      Comparison: None     Findings:  1.  No consolidation  2.  No pulmonary edema, pleural effusion or pneumothorax       Impression:      Impression:  No acute process.        This report was finalized on 8/3/2024 7:48 PM by Ibrahima Trujillo DO.       FL Surgery Fluoro [[id number]] Collected: 08/02/24 1339     Updated: 08/02/24 1342    Narrative:      EXAMINATION: FL SURGERY FLUORO-      CLINICAL INDICATION: ureatal stents        COMPARISON: None available     Total fluoroscopy time 15.8 seconds     Fluoroscopy was provided and 4 images were acquired's stent was placed     For a full procedure report, please see the report provided by the  performing physician        This report was finalized on 8/2/2024 1:40 PM by Dr. Kenneth Jones MD.             I have personally reviewed the above radiology results. "   ----------------------------------------------------------------------------------------------------------------------    Assessment/Plan     Shortness of breath  Cough  In the setting of underlying COPD  History of CHF with preserved EF  Medicine consulted for cough, O2 requirement greater than baseline and shortness of breath in the setting of COPD. Patient reports shortness of breath has been persistent post operatively and he is complaining of cough productive of clear sputum.  WBC trend up overnight from 15 to 17k. Had received cefoxitin perioperatively, stopped after 8/4  O2 sats currently mid 90s on 4L. Baseline is RA.   ABG and CXR pending  Will further assess with procal, BNP.   Add BMP for renal function should he require diuresis.   Hold further fluids for now  Continue scheduled duo-neb  We will follow up results and treat as indicated    Colovesical fistula s/p sigmoid resection, colostomy, and ureteral stent placement  Continue management per primary    CAD   Diabetes mellitus   HLD  HTN  Home regimen continued per primary.   BP trend stable, slightly above goal. Will monitor closely.   Glucose trend okay. Would add SSI if needed.    Thank you for the consult and allowing us to participate in the care of your patient, hospital medicine will continue to follow. Please do not hesitate to call with any questions or concerns.      Nick Morrell PA-C  08/07/24  09:48 EDT    Attestation: I personally discussed the patient's history of presenting illness, physical examination, assessment, and plan with my attending physician, Dr. Dunn.     Electronically signed by Maddy Dunn DO at 08/07/24 1602       Discharge Summary    No notes of this type exist for this encounter.       Discharge Order (From admission, onward)       Start     Ordered    08/09/24 1114  Discharge patient  Once        Expected Discharge Date: 08/09/24   Discharge Disposition: Home or Self Care       08/09/24 1113     08/09/24 1112  Discharge patient  Once,   Status:  Canceled        Expected Discharge Date: 08/09/24   Expected Discharge Time: Morning   Discharge Disposition: Home-Health Care [facility]   Physician of Record for Attribution - Please select from Treatment Team: LUCY BAIRES [[id number]]   Review needed by CMO to determine Physician of Record: No   Please choose which facility the patient is currently admitted if they are being discharged to another facility or unit.:  [facility]      Question Answer Comment   Physician of Record for Attribution - Please select from Treatment Team LUCY BAIRES    Review needed by CMO to determine Physician of Record No    Please choose which facility the patient is currently admitted if they are being discharged to another facility or unit.  [facility]        08/09/24 1115    08/08/24 1554  Discharge patient  Once,   Status:  Canceled        Expected Discharge Date: 08/08/24   Discharge Disposition: Home or Self Care   Physician of Record for Attribution - Please select from Treatment Team: LUCY BAIRES [[id number]]   Review needed by CMO to determine Physician of Record: No      Question Answer Comment   Physician of Record for Attribution - Please select from Treatment Team LUCY BAIRES    Review needed by CMO to determine Physician of Record No        08/08/24 1555

## 2024-08-09 NOTE — PROGRESS NOTES
Patient Identification:  Name:  Celestine Arriaga  Age:  58 y.o.  Sex:  male  :  1965  MRN:  [id number]  Visit Number:  [id number]  Primary Care Provider:  Rachell Rivers APRN    Length of stay:  7    Subjective/Interval History/Consultants/Procedures     Chief Complaint: No chief complaint on file.      Subjective/Interval History:    58 y.o. male who was admitted on 2024 with colovesical fistula. Medicine consulted for shortness of breath, hypoxia.     PMH is significant for CHF, COPD, CAD, diabetes mellitus, hyperlipidemia, GERD, hypertension   For complete admission information, please see history and physical.     Today, the patient was seen and examined, ambulating around his room. He voiced no new complaints. Work of breathing continues to improve.      Room location at the time of evaluation was [facility].    ----------------------------------------------------------------------------------------------------------------------  Current Hospital Meds:  acetaminophen, 1,000 mg, Oral, Q6H  amLODIPine, 10 mg, Oral, Daily  cefTRIAXone, 1,000 mg, Intravenous, Q24H  doxycycline, 100 mg, Intravenous, Q12H  heparin (porcine), 5,000 Units, Subcutaneous, Q8H  hydrALAZINE, 50 mg, Oral, TID  ipratropium-albuterol, 3 mL, Nebulization, Q6H - RT  methocarbamol, 750 mg, Oral, 4x Daily  methylPREDNISolone sodium succinate, 40 mg, Intravenous, Q12H  metoprolol tartrate, 25 mg, Oral, BID  senna-docusate sodium, 2 tablet, Oral, BID  sertraline, 100 mg, Oral, Daily  sodium chloride, 10 mL, Intravenous, Q12H      [Held by provider] lactated ringers, 50 mL/hr, Last Rate: Stopped (24 1058)      ----------------------------------------------------------------------------------------------------------------------      Objective     Vital Signs:  Temp:  [97.5 °F (36.4 °C)-98.5 °F (36.9 °C)] 97.5 °F (36.4 °C)  Heart Rate:  [57-89] 57  Resp:  [16-20] 20  BP: (152-174)/(77-96) 152/77      24  1107 24  5064    Weight: 115 kg (254 lb 9.6 oz) 116 kg (254 lb 14.4 oz)     Body mass index is 36.57 kg/m².    Intake/Output Summary (Last 24 hours) at 8/9/2024 1319  Last data filed at 8/9/2024 0900  Gross per 24 hour   Intake 714.45 ml   Output 2900 ml   Net -2185.55 ml     I/O this shift:  In: 240 [P.O.:240]  Out: 50 [Stool:50]  Diet: Gastrointestinal; Fiber-Restricted; Texture: Soft to Chew (NDD 3); Soft to Chew: Whole Meat; Fluid Consistency: Thin (IDDSI 0)  ----------------------------------------------------------------------------------------------------------------------    Physical Exam  Vitals and nursing note reviewed.   Constitutional:       General: He is not in acute distress.  HENT:      Head: Normocephalic and atraumatic.   Eyes:      Extraocular Movements: Extraocular movements intact.   Cardiovascular:      Rate and Rhythm: Normal rate.   Pulmonary:      Effort: Pulmonary effort is normal.      Breath sounds: No wheezing or rhonchi.   Abdominal:      Palpations: Abdomen is soft.      Tenderness: There is no abdominal tenderness.   Musculoskeletal:      Right lower leg: No edema.      Left lower leg: No edema.   Skin:     General: Skin is warm and dry.   Neurological:      Mental Status: He is alert. Mental status is at baseline.   Psychiatric:         Mood and Affect: Mood normal.                ----------------------------------------------------------------------------------------------------------------------  Tele:      ----------------------------------------------------------------------------------------------------------------------  Results from last 7 days   Lab Units 08/07/24  0113   PROBNP pg/mL 332.6     Results from last 7 days   Lab Units 08/09/24  0124 08/08/24  0114 08/08/24  0113 08/07/24  0113 08/06/24  0320   CRP mg/dL  --   --  10.00* 13.52* 19.34*   WBC 10*3/mm3 23.38* 15.36*  --  17.34* 15.60*   HEMOGLOBIN g/dL 12.9* 13.1  --  12.8* 12.5*   HEMATOCRIT % 39.6 39.5  --  40.4 38.5   MCV fL  "100.0* 99.7*  --  102.0* 99.0*   MCHC g/dL 32.6 33.2  --  31.7 32.5   PLATELETS 10*3/mm3 449 393  --  320 297     Results from last 7 days   Lab Units 08/07/24  1028   PH, ARTERIAL pH units 7.441   PO2 ART mm Hg 63.0*   PCO2, ARTERIAL mm Hg 28.9*   HCO3 ART mmol/L 19.7*     Results from last 7 days   Lab Units 08/08/24  0113 08/05/24  0235 08/04/24  0032   SODIUM mmol/L 136 137 135*   POTASSIUM mmol/L 4.3 4.0 3.9   MAGNESIUM mg/dL  --   --  1.8   CHLORIDE mmol/L 106 107 102   CO2 mmol/L 17.1* 20.6* 21.2*   BUN mg/dL 16 10 16   CREATININE mg/dL 0.85 0.82 1.10   CALCIUM mg/dL 9.2 9.2 8.4*   GLUCOSE mg/dL 175* 120* 112*   Estimated Creatinine Clearance: 123.8 mL/min (by C-G formula based on SCr of 0.85 mg/dL).  No results found for: \"AMMONIA\"      No results found for: \"BLOODCX\"  No results found for: \"URINECX\"  No results found for: \"WOUNDCX\"  No results found for: \"STOOLCX\"  ----------------------------------------------------------------------------------------------------------------------  Imaging Results (Last 24 Hours)       ** No results found for the last 24 hours. **          ----------------------------------------------------------------------------------------------------------------------   I have reviewed the above laboratory values for 08/09/24    Assessment/Plan     Active Hospital Problems    Diagnosis  POA    **Colovesical fistula [N32.1]  Unknown    Status post colon resection [Z90.49]  Not Applicable         ASSESSMENT/PLAN:    Acute COPD exacerbation  Suspect acute bronchitis  Chronic HFpEF, possible mild decompensation  On further evaluation yesterday ABG confirmed patient requiring 4 L currently.  Chest x-ray nonspecific, Pro-Dmitry negative.  BNP WNL though continuing to hold further fluid replacement for now in the setting of HFpEF.  A.m. labs showed improving CRP and white blood cell count, BMP is stable.  Continue steroids, Rocephin and doxycycline for acute COPD exacerbation and suspected " acute bronchitis.  Scheduled DuoNebs  Respiratory status stable, improving, weaned to 2L overnight.   Will continue antibiotics, steroids at discharge to complete course. Patient is also being discharge with supplemental O2 at 2L continuously for now.   Recommend f/u with PCP within one week and placed referral order.        Colovesical fistula s/p sigmoid resection, colostomy, and ureteral stent placement  Continue management per primary     CAD   Diabetes mellitus   HLD  HTN  Home regimen continued per primary.          Nick Morrell PA-C  08/09/24  13:19 EDT

## 2024-08-09 NOTE — CASE MANAGEMENT/SOCIAL WORK
Case Management/Social Work    Patient Name:  Celestine Arriaga  YOB: 1965  MRN: [id number]  Admit Date:  8/2/2024        CM received an order for continuous home oxygen @ 2L NC from provider. CM faxed order and ambulatory pulse ox to [facility]. CM called [facility] and spoke to Gayathri to follow up on receipt. Gayathri will have portable tank brought to patient's room and the other supplies will be delivered to his home today.        Electronically signed by:  Kristin Hartley RN  08/09/24 13:32 EDT

## 2024-08-09 NOTE — OUTREACH NOTE
Prep Survey      Flowsheet Row Responses   [facility] facility patient discharged from? [facility]   Is LACE score < 7 ? No   Eligibility Readm Mgmt   Discharge diagnosis COLON RESECTION SIGMOID URETEROSCOPY URETERAL CATHETER/STENT INSERTION APPENDECTOMY COLOSTOMY LOOP   Does the patient have one of the following disease processes/diagnoses(primary or secondary)? General Surgery   Does the patient have Home health ordered? Yes   What is the Home health agency?  [facility]   Prep survey completed? Yes            Karen PEARL - Registered Nurse

## 2024-08-09 NOTE — DISCHARGE PLACEMENT REQUEST
"Celestine oCok (58 y.o. Male)       Date of Birth   1965    Social Security Number       Address   [address] [address]    Home Phone       MRN   [id number]       Church   None    Marital Status                               Admission Date   8/2/24    Admission Type   Elective    Admitting Provider   Boogie Hays MD    Attending Provider   Boogie Hays MD    Department, Room/Bed   [address], 3338/1P       Discharge Date       Discharge Disposition   Home-Health Care [facility]    Discharge Destination                                 Attending Provider: Boogie Hays MD    Allergies: Lisinopril    Isolation: None   Infection: None   Code Status: CPR    Ht: 177.8 cm (70\")   Wt: 116 kg (254 lb 14.4 oz)    Admission Cmt: None   Principal Problem: Colovesical fistula [N32.1]                   Active Insurance as of 8/2/2024       Primary Coverage       Payor Plan Insurance Group Employer/Plan Group    [employer] BLUE CROSS ANTHEM BLUE CROSS BLUE SHIELD PPO [id number]       Payor Plan Address Payor Plan Phone Number Payor Plan Fax Number Effective Dates    [address] [phone]  1/1/2024 - None Entered    [facility] [address]         Subscriber Name Subscriber Birth Date Member ID       CELESTINE COOK 1965 [id number]                     Emergency Contacts        (Rel.) Home Phone Work Phone Mobile Phone    DANIELA COOK (Relative) [phone] -- --    Mike Campos (Relative) -- -- [phone]          Carolyne Arias, RN   Registered Nurse  Nursing Note      Signed  Date of Service:  08/09/24 1324  Creation Time:  08/09/24 1324     Signed        Pt ambulated in hallway with nursing staff. Pt oxygen dropped down to the 70s on room air. Applied 2 Liters, oxygen got back up in the 90s.                [address] [address]  Dept. Phone:  [phone]  Dept. Fax:  [phone] Date Ordered: Aug 9, 2024    " "     Patient:  Celestine Arriaga MRN:  [id number]   [address] [facility] [address] [address] :  1965  SSN:    Phone: [phone] Sex:  M     Weight: 116 kg (254 lb 14.4 oz)         Ht Readings from Last 1 Encounters:   24 177.8 cm (70\")         Oxygen Therapy         (Order ID: [id number])    Diagnosis:  Status post colon resection (Z90.49 [ICD-10-CM] V45.89 [ICD-9-CM])   Quantity:  1     Delivery Modality: Nasal Cannula  Liters Per Minute: 2  Duration: Continuous  Equipment:  Oxygen Concentrator &  &  Portable Gaseous Oxygen System & Portable Oxygen Contents Gaseous &  Conserving Regulator  Length of Need: 99 Months = Lifetime        Authorizing Provider's Phone: [phone]  Verbal Order Mode: Telephone with readback   Authorizing Provider: Boogie Hays MD  Authorizing Provider's NPI: [id number]     Order Entered By: Saloni Mcclellan RN 2024 11:58 AM     Electronically signed by: Boogie Hays MD 2024 12:23 PM      "

## 2024-08-09 NOTE — CASE MANAGEMENT/SOCIAL WORK
Discharge Planning Assessment   [facility]     Patient Name: Celestine Arriaga  MRN: [id number]  Today's Date: 8/9/2024    Admit Date: 8/2/2024            Discharge Plan       Row Name 08/09/24 1244       Plan    Final Discharge Disposition Code 06 - home with home health care    Final Note Pt is being discharged home with provider ordered home health services. Pt is aware and agreeable to discharge. Pt prefers [facility].  faxed [facility] order.  to provide lead rn report number once [facility] has confirmed acceptance. Pt's family to provide transport once meds have been delivered from meds to beds and extra colostomy bags are provided. Lead RN notified.    14:49pm: [facility]  [phone] report number given to Lead RN                 CATALINA Lane

## 2024-08-09 NOTE — NURSING NOTE
Pt ambulated in hallway with nursing staff. Pt oxygen dropped down to the 70s on room air. Applied 2 Liters, oxygen got back up in the 90s.

## 2024-08-09 NOTE — PLAN OF CARE
Goal Outcome Evaluation:  Plan of Care Reviewed With: patient        Progress: no change    Patient resting in bed. VSS on 2L NC. Pt c/o pain and heartburn, PRN medications given per MAR. Colostomy bag changed. Midline dressing changed. No concerns or complaints at this time. Will continue with plan of care.

## 2024-08-09 NOTE — PROGRESS NOTES
POD#6 colecotomy    He did not have a ride yesterday so he stayed overnight. He says he is feeling ok today and can get a ride. Afeb and vss. Abdomen soft and ostomy working. Wound looks good and his lungs are sounding better. He is to go home with O2 and steroids per the hospitalist. WBC elevation is likely due to the steroids.

## 2024-08-12 ENCOUNTER — OFFICE VISIT (OUTPATIENT)
Dept: UROLOGY | Facility: CLINIC | Age: 59
End: 2024-08-12
Payer: COMMERCIAL

## 2024-08-12 VITALS — BODY MASS INDEX: 36.51 KG/M2 | HEIGHT: 70 IN | WEIGHT: 255 LBS

## 2024-08-12 DIAGNOSIS — N32.1 COLOVESICAL FISTULA: Primary | ICD-10-CM

## 2024-08-12 PROCEDURE — 99213 OFFICE O/P EST LOW 20 MIN: CPT

## 2024-08-12 NOTE — PROGRESS NOTES
"Chief Complaint:    Chief Complaint   Patient presents with    hydronephrosis     Post op        Vital Signs:   Ht 177.8 cm (70\")   Wt 116 kg (255 lb)   BMI 36.59 kg/m²   Body mass index is 36.59 kg/m².      HPI:  Celestine Arriaga is a 58 y.o. male who presents today for follow up    History of Present Illness  Mr. Arriaga presents to the clinic for a postop follow-up.  He has a past medical history seen for colovesicular fistula and underwent elective repair by Dr. Hays during recent hospital stay.  He also had bilateral double stents placed by Dr. Salazar.  Patient had indwelling Benton catheter placed postop and returns today for possible voiding trial.  He denies any fecal matter within the urine.  Urine is dark yellow in color with no significant concerns of gross hematuria or fecal matter.  There is some sediments noted.  He is desirous to undergo voiding trial.  I did remove his catheter in office and we will see him back in a week for repeat voiding trial.  If the patient is urinating well we will plan for a in office cystoscopy with stent removal in roughly 1 to 2 weeks.      Past Medical History:  Past Medical History:   Diagnosis Date    Arthritis     Chest pain     CHF (congestive heart failure)     COPD (chronic obstructive pulmonary disease)     Coronary artery disease     Diabetes mellitus     Elevated cholesterol     GERD (gastroesophageal reflux disease)     [place]     Hypertension     MI (myocardial infarction)     cardiac dr told him he had had a mild attack    Neuropathy     LOWER LEGS/FEET    Urinary tract infection        Current Meds:  Current Outpatient Medications   Medication Sig Dispense Refill    acetaminophen (TYLENOL) 500 MG tablet Take 1 tablet by mouth Every 6 (Six) Hours As Needed for Mild Pain.      albuterol sulfate  (90 Base) MCG/ACT inhaler Inhale 1 puff Every 6 (Six) Hours As Needed for Wheezing.      amLODIPine (NORVASC) 10 MG tablet Take 1 tablet by mouth " Daily.      aspirin 81 MG EC tablet Take 1 tablet by mouth Daily.      cefdinir (OMNICEF) 300 MG capsule Take 1 capsule by mouth 2 Times a Day for 3 days. 6 capsule 0    doxycycline (VIBRAMYCIN) 100 MG capsule Take 1 capsule by mouth 2 Times a Day for 3 days. 6 capsule 0    hydrALAZINE (APRESOLINE) 50 MG tablet Take 1 tablet by mouth 3 (Three) Times a Day.      metoprolol tartrate (LOPRESSOR) 25 MG tablet Take 1 tablet by mouth 2 (Two) Times a Day.      nitroglycerin (NITROSTAT) 0.4 MG SL tablet Place 1 tablet under the tongue Every 5 (Five) Minutes As Needed for Chest Pain. Take no more than 3 doses in 15 minutes.      oxyCODONE-acetaminophen (PERCOCET) 5-325 MG per tablet Take 1 tablet by mouth Every 6 (Six) Hours As Needed for Moderate Pain. 15 tablet 0    Ozempic, 0.25 or 0.5 MG/DOSE, 2 MG/3ML solution pen-injector INJECT 0.25 MG  SUBCUTANEOUSLY ONCE A WEEK      predniSONE (DELTASONE) 20 MG tablet Take 2 tablets by mouth Daily for 3 days. 6 tablet 0    sertraline (ZOLOFT) 100 MG tablet Take 1 tablet by mouth Daily.       No current facility-administered medications for this visit.        Allergies:   Allergies   Allergen Reactions    Lisinopril Swelling        Past Surgical History:  Past Surgical History:   Procedure Laterality Date    APPENDECTOMY N/A 8/2/2024    Procedure: APPENDECTOMY;  Surgeon: Boogie Hays MD;  Location: [facility];  Service: General;  Laterality: N/A;    BACK SURGERY      CARDIAC CATHETERIZATION      CARDIAC SURGERY      COLON RESECTION N/A 8/2/2024    Procedure: COLON RESECTION SIGMOID;  Surgeon: Boogie Hays MD;  Location: [facility] OR;  Service: General;  Laterality: N/A;    COLONOSCOPY      COLONOSCOPY N/A 07/18/2024    Procedure: COLONOSCOPY;  Surgeon: Boogie Hays MD;  Location: [facility] OR;  Service: Gastroenterology;  Laterality: N/A;    COLOSTOMY N/A 8/2/2024    Procedure: COLOSTOMY LOOP;  Surgeon: Boogie Hays MD;  Location: [facility] OR;  Service: General;  Laterality:  N/A;    CORONARY ANGIOPLASTY WITH STENT PLACEMENT      3 STENTS TOTAL    CYSTOSCOPY W/ URETERAL STENT PLACEMENT Bilateral 8/2/2024    Procedure: URETEROSCOPY URETERAL CATHETER/STENT INSERTION;  Surgeon: Boogie Hays MD;  Location: [facility];  Service: General;  Laterality: Bilateral;    EYE SURGERY Right     TOE AMPUTATION Left     great toe in  accident       Social History:  Social History     Socioeconomic History    Marital status:    Tobacco Use    Smoking status: Every Day     Current packs/day: 1.50     Average packs/day: 1.5 packs/day for 40.6 years (60.9 ttl pk-yrs)     Types: Cigarettes     Start date: 1984     Passive exposure: Current    Smokeless tobacco: Former     Types: Chew   Vaping Use    Vaping status: Never Used   Substance and Sexual Activity    Alcohol use: Not Currently     Alcohol/week: 2.0 standard drinks of alcohol     Types: 2 Shots of liquor per week     Comment: 2 mixed drinks a night    Drug use: Never    Sexual activity: Defer     Birth control/protection: None       Family History:  Family History   Problem Relation Age of Onset    No Known Problems Father     Cancer Mother        Review of Systems:  Review of Systems   Constitutional:  Positive for fatigue. Negative for chills, fever and unexpected weight change.   HENT:  Negative for congestion and sinus pressure.    Respiratory:  Negative for chest tightness and shortness of breath.    Cardiovascular:  Negative for chest pain.   Gastrointestinal:  Negative for abdominal pain, constipation, diarrhea, nausea and vomiting.   Genitourinary:  Positive for flank pain and urgency. Negative for difficulty urinating, dysuria, frequency and hematuria.   Musculoskeletal:  Positive for back pain. Negative for neck pain.   Skin:  Negative for rash.   Neurological:  Negative for dizziness and headaches.   Hematological:  Bruises/bleeds easily.   Psychiatric/Behavioral:  Negative for confusion and suicidal ideas. The patient  is not nervous/anxious.        Physical Exam:  Physical Exam  Constitutional:       General: He is not in acute distress.     Appearance: Normal appearance.   HENT:      Head: Normocephalic and atraumatic.      Nose: Nose normal.      Mouth/Throat:      Mouth: Mucous membranes are moist.   Eyes:      Conjunctiva/sclera: Conjunctivae normal.   Cardiovascular:      Rate and Rhythm: Normal rate and regular rhythm.      Pulses: Normal pulses.      Heart sounds: Normal heart sounds.   Pulmonary:      Effort: Pulmonary effort is normal.      Breath sounds: Normal breath sounds.   Abdominal:      General: Bowel sounds are normal.      Palpations: Abdomen is soft.   Genitourinary:     Comments: 16 [place] indwelling Benton catheter in place.  Musculoskeletal:         General: Normal range of motion.      Cervical back: Normal range of motion.   Skin:     General: Skin is warm.   Neurological:      General: No focal deficit present.      Mental Status: He is alert and oriented to person, place, and time.   Psychiatric:         Mood and Affect: Mood normal.         Behavior: Behavior normal.         Thought Content: Thought content normal.         Judgment: Judgment normal.       Recent Image (CT and/or KUB):   CT Abdomen and Pelvis: No results found for this or any previous visit.     CT Stone Protocol: No results found for this or any previous visit.     KUB: No results found for this or any previous visit.       Labs:  Brief Urine Lab Results  (Last result in the past 365 days)        Color   Clarity   Blood   Leuk Est   Nitrite   Protein   CREAT   Urine HCG        06/11/24 1036 Gayathri   Clear   Trace   Moderate (2+)   Negative   1+                 Admission on 08/02/2024, Discharged on 08/09/2024   Component Date Value Ref Range Status    Glucose 08/02/2024 138 (H)  70 - 130 mg/dL Final    Reference Lab Report 08/02/2024    Final                    Value:[facility]  [address] [place] [address]   "[id number]  Phone: [phone] or [phone]  Fax: [phone]  Vinicius Gong M.D., Medical Director    PATIENT NAME                           LABORATORY NO.  TEDDY STANLEY                         [id number]  [id number]                         AGE              SEX  SSN           CLIENT REF #  [facility] [place]              58      1965      [id number]   [id number]    [address]                     REQUESTING M.D.     [place] [address]                   LUCY BAIRES  DATE COLLECTED      DATE RECEIVED      DATE REPORTED  2024    DIAGNOSIS:  A.   APPENDIX:  Fibrous obliteration of the appendiceal tip  Incidental periappendiceal benign cyst  No identified dysplasia or malignancy  B.   COLON, RESECTION, SIGMOID COLON:  Diverticulosis coli with associated intramural abscesses  Acute serositis and serosal fibrous                           adhesions  2 benign lymph nodes  Examined margins of resection are viable  No identified dysplasia or malignancy    CLINICAL HISTORY:  Colovesical fistula    SPECIMENS RECEIVED:  A.  APPENDIX  B.  COLON, RESECTION, SIGMOID COLON    MICROSCOPIC DESCRIPTION:  A.  Tissue blocks are prepared and slides are examined microscopically on all  specimens. See diagnosis for details.  B.  Tissue blocks are prepared and slides are examined microscopically on all  specimens. See diagnosis for details.    Professional interpretation rendered by Destiny Carpenter M.D. at [facility], [address] [place] [address].    GROSS DESCRIPTION:  A.  Received in formalin labeled \"appendix\" is a 6 cm long by 0.8 cm in  diameter intact appendix with a moderate amount of attached  mesoappendix.  The serosa is smooth and glistening and has a 0.3 cm tan  nodule located 0.7 cm from the proximal margin.  The proximal margin is  inked black and sectioning reveals a 0.4 cm in average diameter lumen  filled      " "                     with soft, green fecal material.  The mucosa is tan and glistening and  the unremarkable tan appendiceal wall averages 0.2 cm thick.  No masses  or fecaliths are identified.  Representative sections to include the en face  proximal margin, half of the bisected tip and cross-section to include  serosal nodule are submitted in block A1.  B.  Received in formalin labeled \"sigmoid colon\" is a 13.5 cm long by 2.5 cm  in average diameter intact, unopened and tortuous segment of sigmoid  colon encased in mildly indurated pericolonic fat.  The serosa is gray-tan  and dusky with dense adhesions.  The lumen contains a minimal amount of  soft, brown fecal material and the mucosa is tan and glistening with normal  folds.  Sectioning reveals a diffusely thickened bowel wall averaging 1 cm  thick.  Located at least 3 cm from the mucosal margins is a red-brown  fistula tract.  Located approximately 2 cm from the fistula is a possible  perforation and corresponding abscess formation.  No polyps or                           invasive  lesions are identified.  There are no enlarged lymph nodes or mesenteric  masses identified.  Representative sections are submitted as follows: B1:  Proximal mucosal margin en face; B2-B3: Distal mucosal margin, en face;  B4-B5: Fistula; B6: Possible perforation.  HDM    REVIEWED, DIAGNOSED AND ELECTRONICALLY  SIGNED BY:    Destiny Carpenter M.D.  CPT CODES:  55170, [id number]      Glucose 08/02/2024 165 (H)  70 - 130 mg/dL Final    WBC 08/03/2024 20.58 (H)  3.40 - 10.80 10*3/mm3 Final    RBC 08/03/2024 3.89 (L)  4.14 - 5.80 10*6/mm3 Final    Hemoglobin 08/03/2024 12.9 (L)  13.0 - 17.7 g/dL Final    Hematocrit 08/03/2024 38.9  37.5 - 51.0 % Final    MCV 08/03/2024 100.0 (H)  79.0 - 97.0 fL Final    MCH 08/03/2024 33.2 (H)  26.6 - 33.0 pg Final    MCHC 08/03/2024 33.2  31.5 - 35.7 g/dL Final    RDW 08/03/2024 15.4  12.3 - 15.4 % Final    RDW-SD 08/03/2024 57.1 (H)  37.0 - 54.0 fl Final    " MPV 08/03/2024 10.2  6.0 - 12.0 fL Final    Platelets 08/03/2024 267  140 - 450 10*3/mm3 Final    Glucose 08/03/2024 132 (H)  65 - 99 mg/dL Final    BUN 08/03/2024 18  6 - 20 mg/dL Final    Creatinine 08/03/2024 1.26  0.76 - 1.27 mg/dL Final    Sodium 08/03/2024 139  136 - 145 mmol/L Final    Potassium 08/03/2024 4.5  3.5 - 5.2 mmol/L Final    Chloride 08/03/2024 104  98 - 107 mmol/L Final    CO2 08/03/2024 22.1  22.0 - 29.0 mmol/L Final    Calcium 08/03/2024 8.6  8.6 - 10.5 mg/dL Final    BUN/Creatinine Ratio 08/03/2024 14.3  7.0 - 25.0 Final    Anion Gap 08/03/2024 12.9  5.0 - 15.0 mmol/L Final    eGFR 08/03/2024 66.9  >60.0 mL/min/1.73 Final    C-Reactive Protein 08/03/2024 7.03 (H)  0.00 - 0.50 mg/dL Final    WBC 08/04/2024 16.24 (H)  3.40 - 10.80 10*3/mm3 Final    RBC 08/04/2024 3.70 (L)  4.14 - 5.80 10*6/mm3 Final    Hemoglobin 08/04/2024 12.0 (L)  13.0 - 17.7 g/dL Final    Hematocrit 08/04/2024 37.2 (L)  37.5 - 51.0 % Final    MCV 08/04/2024 100.5 (H)  79.0 - 97.0 fL Final    MCH 08/04/2024 32.4  26.6 - 33.0 pg Final    MCHC 08/04/2024 32.3  31.5 - 35.7 g/dL Final    RDW 08/04/2024 15.3  12.3 - 15.4 % Final    RDW-SD 08/04/2024 56.3 (H)  37.0 - 54.0 fl Final    MPV 08/04/2024 10.2  6.0 - 12.0 fL Final    Platelets 08/04/2024 240  140 - 450 10*3/mm3 Final    Glucose 08/04/2024 112 (H)  65 - 99 mg/dL Final    BUN 08/04/2024 16  6 - 20 mg/dL Final    Creatinine 08/04/2024 1.10  0.76 - 1.27 mg/dL Final    Sodium 08/04/2024 135 (L)  136 - 145 mmol/L Final    Potassium 08/04/2024 3.9  3.5 - 5.2 mmol/L Final    Slight hemolysis detected by analyzer. Result may be falsely elevated.    Chloride 08/04/2024 102  98 - 107 mmol/L Final    CO2 08/04/2024 21.2 (L)  22.0 - 29.0 mmol/L Final    Calcium 08/04/2024 8.4 (L)  8.6 - 10.5 mg/dL Final    BUN/Creatinine Ratio 08/04/2024 14.5  7.0 - 25.0 Final    Anion Gap 08/04/2024 11.8  5.0 - 15.0 mmol/L Final    eGFR 08/04/2024 78.8  >60.0 mL/min/1.73 Final    Magnesium 08/04/2024  1.8  1.6 - 2.6 mg/dL Final    Phosphorus 08/04/2024 3.0  2.5 - 4.5 mg/dL Final    WBC 08/05/2024 17.32 (H)  3.40 - 10.80 10*3/mm3 Final    RBC 08/05/2024 3.55 (L)  4.14 - 5.80 10*6/mm3 Final    Hemoglobin 08/05/2024 11.6 (L)  13.0 - 17.7 g/dL Final    Hematocrit 08/05/2024 36.0 (L)  37.5 - 51.0 % Final    MCV 08/05/2024 101.4 (H)  79.0 - 97.0 fL Final    MCH 08/05/2024 32.7  26.6 - 33.0 pg Final    MCHC 08/05/2024 32.2  31.5 - 35.7 g/dL Final    RDW 08/05/2024 15.1  12.3 - 15.4 % Final    RDW-SD 08/05/2024 57.0 (H)  37.0 - 54.0 fl Final    MPV 08/05/2024 10.0  6.0 - 12.0 fL Final    Platelets 08/05/2024 256  140 - 450 10*3/mm3 Final    Glucose 08/05/2024 120 (H)  65 - 99 mg/dL Final    BUN 08/05/2024 10  6 - 20 mg/dL Final    Creatinine 08/05/2024 0.82  0.76 - 1.27 mg/dL Final    Sodium 08/05/2024 137  136 - 145 mmol/L Final    Potassium 08/05/2024 4.0  3.5 - 5.2 mmol/L Final    Chloride 08/05/2024 107  98 - 107 mmol/L Final    CO2 08/05/2024 20.6 (L)  22.0 - 29.0 mmol/L Final    Calcium 08/05/2024 9.2  8.6 - 10.5 mg/dL Final    BUN/Creatinine Ratio 08/05/2024 12.2  7.0 - 25.0 Final    Anion Gap 08/05/2024 9.4  5.0 - 15.0 mmol/L Final    eGFR 08/05/2024 103.1  >60.0 mL/min/1.73 Final    C-Reactive Protein 08/05/2024 26.37 (H)  0.00 - 0.50 mg/dL Final    WBC 08/06/2024 15.60 (H)  3.40 - 10.80 10*3/mm3 Final    RBC 08/06/2024 3.89 (L)  4.14 - 5.80 10*6/mm3 Final    Hemoglobin 08/06/2024 12.5 (L)  13.0 - 17.7 g/dL Final    Hematocrit 08/06/2024 38.5  37.5 - 51.0 % Final    MCV 08/06/2024 99.0 (H)  79.0 - 97.0 fL Final    MCH 08/06/2024 32.1  26.6 - 33.0 pg Final    MCHC 08/06/2024 32.5  31.5 - 35.7 g/dL Final    RDW 08/06/2024 15.2  12.3 - 15.4 % Final    RDW-SD 08/06/2024 54.7 (H)  37.0 - 54.0 fl Final    MPV 08/06/2024 10.3  6.0 - 12.0 fL Final    Platelets 08/06/2024 297  140 - 450 10*3/mm3 Final    C-Reactive Protein 08/06/2024 19.34 (H)  0.00 - 0.50 mg/dL Final    WBC 08/07/2024 17.34 (H)  3.40 - 10.80 10*3/mm3  Final    RBC 08/07/2024 3.96 (L)  4.14 - 5.80 10*6/mm3 Final    Hemoglobin 08/07/2024 12.8 (L)  13.0 - 17.7 g/dL Final    Hematocrit 08/07/2024 40.4  37.5 - 51.0 % Final    MCV 08/07/2024 102.0 (H)  79.0 - 97.0 fL Final    MCH 08/07/2024 32.3  26.6 - 33.0 pg Final    MCHC 08/07/2024 31.7  31.5 - 35.7 g/dL Final    RDW 08/07/2024 15.1  12.3 - 15.4 % Final    RDW-SD 08/07/2024 56.9 (H)  37.0 - 54.0 fl Final    MPV 08/07/2024 9.9  6.0 - 12.0 fL Final    Platelets 08/07/2024 320  140 - 450 10*3/mm3 Final    C-Reactive Protein 08/07/2024 13.52 (H)  0.00 - 0.50 mg/dL Final    Procalcitonin 08/07/2024 0.16  0.00 - 0.25 ng/mL Final    proBNP 08/07/2024 332.6  0.0 - 900.0 pg/mL Final    Site 08/07/2024 Right Radial   Final    Jesús's Test 08/07/2024 Positive   Final    pH, Arterial 08/07/2024 7.441  7.350 - 7.450 pH units Final    pCO2, Arterial 08/07/2024 28.9 (L)  35.0 - 45.0 mm Hg Final    84 Value below reference range    pO2, Arterial 08/07/2024 63.0 (L)  83.0 - 108.0 mm Hg Final    84 Value below reference range    HCO3, Arterial 08/07/2024 19.7 (L)  20.0 - 26.0 mmol/L Final    84 Value below reference range    Base Excess, Arterial 08/07/2024 -3.3 (L)  0.0 - 2.0 mmol/L Final    O2 Saturation, Arterial 08/07/2024 92.7 (L)  94.0 - 99.0 % Final    84 Value below reference range    Hemoglobin, Blood Gas 08/07/2024 12.9 (L)  14 - 18 g/dL Final    84 Value below reference range    Hematocrit, Blood Gas 08/07/2024 39.6  38.0 - 51.0 % Final    Oxyhemoglobin 08/07/2024 91.3 (L)  94 - 99 % Final    84 Value below reference range    Methemoglobin 08/07/2024 0.20  0.00 - 3.00 % Final    Carboxyhemoglobin 08/07/2024 1.3  0 - 5 % Final    A-a DO2 08/07/2024 148.6  0.0 - 300.0 mmHg Final    CO2 Content 08/07/2024 20.6 (L)  22 - 33 mmol/L Final    Barometric Pressure for Blood Gas 08/07/2024 723  mmHg Final    Modality 08/07/2024 Nasal Cannula   Final    FIO2 08/07/2024 36  % Final    Flow Rate 08/07/2024 4.0  lpm Final    Ventilator  Mode 08/07/2024 NA   Final    Collected by 08/07/2024 [id number]   Final    Meter: [id number]     :  [id number]    Respiratory Culture 08/07/2024 Moderate growth (3+) Normal respiratory daryn. No S. aureus or Pseudomonas aeruginosa detected. Final report.   Final    Gram Stain 08/07/2024 Few (2+) Epithelial cells seen   Final    Gram Stain 08/07/2024 Moderate (3+) WBCs seen   Final    Gram Stain 08/07/2024 Many (4+) Mixed bacterial morphotypes seen on Gram Stain   Final    C-Reactive Protein 08/08/2024 10.00 (H)  0.00 - 0.50 mg/dL Final    Glucose 08/08/2024 175 (H)  65 - 99 mg/dL Final    BUN 08/08/2024 16  6 - 20 mg/dL Final    Creatinine 08/08/2024 0.85  0.76 - 1.27 mg/dL Final    Sodium 08/08/2024 136  136 - 145 mmol/L Final    Potassium 08/08/2024 4.3  3.5 - 5.2 mmol/L Final    Chloride 08/08/2024 106  98 - 107 mmol/L Final    CO2 08/08/2024 17.1 (L)  22.0 - 29.0 mmol/L Final    Calcium 08/08/2024 9.2  8.6 - 10.5 mg/dL Final    BUN/Creatinine Ratio 08/08/2024 18.8  7.0 - 25.0 Final    Anion Gap 08/08/2024 12.9  5.0 - 15.0 mmol/L Final    eGFR 08/08/2024 102.0  >60.0 mL/min/1.73 Final    WBC 08/08/2024 15.36 (H)  3.40 - 10.80 10*3/mm3 Final    RBC 08/08/2024 3.96 (L)  4.14 - 5.80 10*6/mm3 Final    Hemoglobin 08/08/2024 13.1  13.0 - 17.7 g/dL Final    Hematocrit 08/08/2024 39.5  37.5 - 51.0 % Final    MCV 08/08/2024 99.7 (H)  79.0 - 97.0 fL Final    MCH 08/08/2024 33.1 (H)  26.6 - 33.0 pg Final    MCHC 08/08/2024 33.2  31.5 - 35.7 g/dL Final    RDW 08/08/2024 14.8  12.3 - 15.4 % Final    RDW-SD 08/08/2024 54.9 (H)  37.0 - 54.0 fl Final    MPV 08/08/2024 10.7  6.0 - 12.0 fL Final    Platelets 08/08/2024 393  140 - 450 10*3/mm3 Final    Neutrophil % 08/08/2024 86.9 (H)  42.7 - 76.0 % Final    Lymphocyte % 08/08/2024 9.0 (L)  19.6 - 45.3 % Final    Monocyte % 08/08/2024 2.7 (L)  5.0 - 12.0 % Final    Eosinophil % 08/08/2024 0.1 (L)  0.3 - 6.2 % Final    Basophil % 08/08/2024 0.3  0.0 - 1.5 % Final     Immature Grans % 08/08/2024 1.0 (H)  0.0 - 0.5 % Final    Neutrophils, Absolute 08/08/2024 13.33 (H)  1.70 - 7.00 10*3/mm3 Final    Lymphocytes, Absolute 08/08/2024 1.39  0.70 - 3.10 10*3/mm3 Final    Monocytes, Absolute 08/08/2024 0.42  0.10 - 0.90 10*3/mm3 Final    Eosinophils, Absolute 08/08/2024 0.01  0.00 - 0.40 10*3/mm3 Final    Basophils, Absolute 08/08/2024 0.05  0.00 - 0.20 10*3/mm3 Final    Immature Grans, Absolute 08/08/2024 0.16 (H)  0.00 - 0.05 10*3/mm3 Final    nRBC 08/08/2024 0.0  0.0 - 0.2 /100 WBC Final    WBC 08/09/2024 23.38 (H)  3.40 - 10.80 10*3/mm3 Final    RBC 08/09/2024 3.96 (L)  4.14 - 5.80 10*6/mm3 Final    Hemoglobin 08/09/2024 12.9 (L)  13.0 - 17.7 g/dL Final    Hematocrit 08/09/2024 39.6  37.5 - 51.0 % Final    MCV 08/09/2024 100.0 (H)  79.0 - 97.0 fL Final    MCH 08/09/2024 32.6  26.6 - 33.0 pg Final    MCHC 08/09/2024 32.6  31.5 - 35.7 g/dL Final    RDW 08/09/2024 15.1  12.3 - 15.4 % Final    RDW-SD 08/09/2024 56.1 (H)  37.0 - 54.0 fl Final    MPV 08/09/2024 10.8  6.0 - 12.0 fL Final    Platelets 08/09/2024 449  140 - 450 10*3/mm3 Final    Neutrophil % 08/09/2024 79.7 (H)  42.7 - 76.0 % Final    Lymphocyte % 08/09/2024 10.9 (L)  19.6 - 45.3 % Final    Monocyte % 08/09/2024 7.0  5.0 - 12.0 % Final    Eosinophil % 08/09/2024 0.0 (L)  0.3 - 6.2 % Final    Basophil % 08/09/2024 0.3  0.0 - 1.5 % Final    Immature Grans % 08/09/2024 2.1 (H)  0.0 - 0.5 % Final    Neutrophils, Absolute 08/09/2024 18.62 (H)  1.70 - 7.00 10*3/mm3 Final    Lymphocytes, Absolute 08/09/2024 2.55  0.70 - 3.10 10*3/mm3 Final    Monocytes, Absolute 08/09/2024 1.64 (H)  0.10 - 0.90 10*3/mm3 Final    Eosinophils, Absolute 08/09/2024 0.00  0.00 - 0.40 10*3/mm3 Final    Basophils, Absolute 08/09/2024 0.07  0.00 - 0.20 10*3/mm3 Final    Immature Grans, Absolute 08/09/2024 0.50 (H)  0.00 - 0.05 10*3/mm3 Final    nRBC 08/09/2024 0.0  0.0 - 0.2 /100 WBC Final   Pre-Admission Testing on 07/31/2024   Component Date Value Ref  Range Status    Glucose 07/31/2024 113 (H)  65 - 99 mg/dL Final    BUN 07/31/2024 11  6 - 20 mg/dL Final    Creatinine 07/31/2024 0.83  0.76 - 1.27 mg/dL Final    Sodium 07/31/2024 142  136 - 145 mmol/L Final    Potassium 07/31/2024 3.9  3.5 - 5.2 mmol/L Final    Chloride 07/31/2024 104  98 - 107 mmol/L Final    CO2 07/31/2024 20.9 (L)  22.0 - 29.0 mmol/L Final    Calcium 07/31/2024 9.8  8.6 - 10.5 mg/dL Final    BUN/Creatinine Ratio 07/31/2024 13.3  7.0 - 25.0 Final    Anion Gap 07/31/2024 17.1 (H)  5.0 - 15.0 mmol/L Final    eGFR 07/31/2024 102.7  >60.0 mL/min/1.73 Final    WBC 07/31/2024 10.97 (H)  3.40 - 10.80 10*3/mm3 Final    RBC 07/31/2024 4.85  4.14 - 5.80 10*6/mm3 Final    Hemoglobin 07/31/2024 15.7  13.0 - 17.7 g/dL Final    Hematocrit 07/31/2024 47.2  37.5 - 51.0 % Final    MCV 07/31/2024 97.3 (H)  79.0 - 97.0 fL Final    MCH 07/31/2024 32.4  26.6 - 33.0 pg Final    MCHC 07/31/2024 33.3  31.5 - 35.7 g/dL Final    RDW 07/31/2024 15.3  12.3 - 15.4 % Final    RDW-SD 07/31/2024 55.2 (H)  37.0 - 54.0 fl Final    MPV 07/31/2024 10.1  6.0 - 12.0 fL Final    Platelets 07/31/2024 304  140 - 450 10*3/mm3 Final    QT Interval 07/31/2024 402  ms Final    QTC Interval 07/31/2024 448  ms Final   Admission on 07/18/2024, Discharged on 07/18/2024   Component Date Value Ref Range Status    Glucose 07/18/2024 138 (H)  70 - 130 mg/dL Final   Office Visit on 06/11/2024   Component Date Value Ref Range Status    Color 06/11/2024 Gayathri  Yellow, Straw, Dark Yellow, Gayathri Final    Clarity, UA 06/11/2024 Clear  Clear Final    Specific Gravity  06/11/2024 1.010  1.005 - 1.030 Final    pH, Urine 06/11/2024 7.5  5.0 - 8.0 Final    Leukocytes 06/11/2024 Moderate (2+) (A)  Negative Final    Nitrite, UA 06/11/2024 Negative  Negative Final    Protein, POC 06/11/2024 1+ (A)  Negative mg/dL Final    Glucose, UA 06/11/2024 Negative  Negative mg/dL Final    Ketones, UA 06/11/2024 Trace (A)  Negative Final    Urobilinogen, UA 06/11/2024  Normal  Normal, 0.2 E.U./dL Final    Bilirubin 06/11/2024 Small (1+) (A)  Negative Final    Blood, UA 06/11/2024 Trace (A)  Negative Final    Lot Number 06/11/2024 [id number]   Final    Expiration Date 06/11/2024 10/25/24   Final    Urine Culture 06/11/2024 No growth   Final    PSA 06/11/2024 0.8  0.0 - 4.0 ng/mL Final    Roche ECLIA methodology.  According to the [employer], Serum PSA should  decrease and remain at undetectable levels after radical  prostatectomy. The AUA defines biochemical recurrence as an initial  PSA value 0.2 ng/mL or greater followed by a subsequent confirmatory  PSA value 0.2 ng/mL or greater.  Values obtained with different assay methods or kits cannot be used  interchangeably. Results cannot be interpreted as absolute evidence  of the presence or absence of malignant disease.    PSA, Free 06/11/2024 0.06  N/A ng/mL Final    Roche ECLIA methodology.    PSA, Free % 06/11/2024 7.5  % Final    The table below lists the probability of prostate cancer for  men with non-suspicious CHAGO results and total PSA between  4 and 10 ng/mL, by patient age (Romario et al, MADDIE 1998,  279:1542).                    % Free PSA       50-64 yr        65-75 yr                    0.00-10.00%        56%             55%                   10.01-15.00%        24%             35%                   15.01-20.00%        17%             23%                   20.01-25.00%        10%             20%                        >25.00%         5%              9%  Please note:  Romario et al did not make specific                recommendations regarding the use of                percent free PSA for any other population                of men.        Procedure: None  Procedures     I have reviewed and agree with the above PMH, PSH, FMH, social history, medications, allergies, and labs.     Assessment/Plan:   Problem List Items Addressed This Visit       Colovesical fistula - Primary       Health  Maintenance:   Health Maintenance Due   Topic Date Due    URINE MICROALBUMIN  Never done    Pneumococcal Vaccine 0-64 (1 of 2 - PCV) Never done    DIABETIC EYE EXAM  Never done    Hepatitis B (1 of 3 - 19+ 3-dose series) Never done    LIPID PANEL  11/23/2016    LUNG CANCER SCREENING  Never done    COVID-19 Vaccine (3 - 2023-24 season) 09/01/2023    HEPATITIS C SCREENING  Never done    ANNUAL PHYSICAL  Never done    DIABETIC FOOT EXAM  Never done    INFLUENZA VACCINE  08/01/2024        Smoking Counseling: Everyday smoker.  Former user of smokeless tobacco.  Counseling given however not record at this time.    Urine Incontinence: Patient reports that he is not currently experiencing any symptoms of urinary incontinence.    Patient was given instructions and counseling regarding his condition or for health maintenance advice. Please see specific information pulled into the AVS if appropriate.    Patient Education:   Colovesicular fistula -patient is postop and has had good urinary output since surgery.  We will remove the patient's catheter for voiding trial in office today.  Advised him return to the clinic or to the nearest ER if he is unable to urinate voluntarily.  We discussed the risk and benefits of this in detail.  Otherwise we will place him back in a week for reevaluation.  Bilateral ureteral stents -patient has bilateral ureteral stents in place with Dr. Salazar and we will tentatively schedule him for an in office cystoscopy with stent extraction in roughly 1 to 2 weeks pending voiding trial.  As long as patient is urinating well I do not expect any acute complications.  Vies him return to the clinic sooner if needed.  He verbalized understanding.    Visit Diagnoses:    ICD-10-CM ICD-9-CM   1. Colovesical fistula  N32.1 596.1     A total of 20 minutes were spent coordinating this patient’s care in clinic today; 12 minutes of which were face-to-face with the patient, reviewing medical history and counseling  on the current treatment and followup plan.  All questions were answered to patient's satisfaction.    Meds Ordered During Visit:  No orders of the defined types were placed in this encounter.      Follow Up Appointment: 1 week  No follow-ups on file.      This document has been electronically signed by Peterson Taylor PA-C   August 12, 2024 16:29 EDT    Part of this note may be an electronic transcription/translation of spoken language to printed text using the Dragon Dictation System.

## 2024-08-13 ENCOUNTER — READMISSION MANAGEMENT (OUTPATIENT)
Dept: CALL CENTER | Facility: HOSPITAL | Age: 59
End: 2024-08-13
Payer: COMMERCIAL

## 2024-08-13 NOTE — OUTREACH NOTE
General Surgery Week 1 Survey      Flowsheet Row Responses   [facility] patient discharged from? [facility]   Does the patient have one of the following disease processes/diagnoses(primary or secondary)? General Surgery   Week 1 attempt successful? Yes   Call start time 0932   Call end time 0938   Discharge diagnosis COLON RESECTION SIGMOID URETEROSCOPY URETERAL CATHETER/STENT INSERTION APPENDECTOMY COLOSTOMY LOOP   Person spoke with today (if not patient) and relationship pt   Meds reviewed with patient/caregiver? Yes   Is the patient having any side effects they believe may be caused by any medication additions or changes? No   Does the patient have all medications related to this admission filled (includes all antibiotics, pain medications, etc.) Yes   Is the patient taking all medications as directed (includes completed medication regime)? Yes   Does the patient have a follow up appointment scheduled with their surgeon? Yes  [8/15/2024 at 1:50 PM]   Has the patient kept scheduled appointments due by today? N/A   Comments GI/Urology 8/19/24   What is the Home health agency?  [facility] [place] [facility]   Has home health visited the patient within 72 hours of discharge? N/A   Psychosocial issues? No   Did the patient receive a copy of their discharge instructions? Yes   Nursing interventions Reviewed instructions with patient   What is the patient's perception of their health status since discharge? Improving   Nursing interventions Nurse provided patient education   Is the patient /caregiver able to teach back basic post-op care? Take showers only when approved by MD-sponge bathe until then, No tub bath, swimming, or hot tub until instructed by MD, Keep incision areas clean,dry and protected, Lifting as instructed by MD in discharge instructions   Is the patient/caregiver able to teach back signs and symptoms of incisional infection? Increased redness, swelling or pain at the incisonal site, Increased  drainage or bleeding, Incisional warmth, Pus or odor from incision, Fever   Is the patient/caregiver able to teach back steps to recovery at home? Rest and rebuild strength, gradually increase activity, Eat a well-balance diet   If the patient is a current smoker, are they able to teach back resources for cessation? Not a smoker   Week 1 call completed? Yes   Is the patient interested in additional calls from an ambulatory ? No   Would this patient benefit from a Referral to [facility] Social Work? No   Wrap up additional comments Pt denies any s/s infection, and shares area around colostomy bag is irritated. HH had already visited and seen area around colostomy bag. Pt educated on s/s of infection and when to call surgeon. Reviewed AVS/meds/instructions with pt. Pt verified PCP, post-op, urology/GI fu appts.   Call end time [phone]            Katharina TIM - Registered Nurse

## 2024-08-15 ENCOUNTER — LAB (OUTPATIENT)
Dept: LAB | Facility: HOSPITAL | Age: 59
End: 2024-08-15
Payer: COMMERCIAL

## 2024-08-15 ENCOUNTER — OFFICE VISIT (OUTPATIENT)
Dept: SURGERY | Facility: CLINIC | Age: 59
End: 2024-08-15
Payer: COMMERCIAL

## 2024-08-15 DIAGNOSIS — Z90.49 STATUS POST COLON RESECTION: ICD-10-CM

## 2024-08-15 DIAGNOSIS — N32.1 COLOVESICAL FISTULA: Primary | ICD-10-CM

## 2024-08-15 PROCEDURE — 81001 URINALYSIS AUTO W/SCOPE: CPT

## 2024-08-15 PROCEDURE — 99024 POSTOP FOLLOW-UP VISIT: CPT | Performed by: SURGERY

## 2024-08-15 PROCEDURE — 87086 URINE CULTURE/COLONY COUNT: CPT

## 2024-08-15 NOTE — DISCHARGE SUMMARY
Date of Discharge:  8/15/2024    Discharge Diagnosis: colovesical fistula, COPD    Presenting Problem/History of Present Illness  Active Hospital Problems    Diagnosis  POA    **Colovesical fistula [N32.1]  Unknown    Status post colon resection [Z90.49]  Not Applicable      Resolved Hospital Problems   No resolved problems to display.           Hospital Course  Patient is a 58 y.o. male presented with a colovesical fistula. He had a difficult sigmoid resection due to severe scarring in the pelvis. Path showed only diverticular disease. He had pre existing COPD and did need supplementa oxygen as well as some respiratory treatments and steroids. He was discharged with stents in that will be removed outpatient as well as the mccarthy. He was tolerating his diet and ostomy was working well. .      Procedures Performed    Procedure(s):  COLON RESECTION SIGMOID  URETEROSCOPY URETERAL CATHETER/STENT INSERTION  APPENDECTOMY  COLOSTOMY LOOP  -------------------       Consults:   Consults       Date and Time Order Name Status Description    8/7/2024  8:58 AM Inpatient Hospitalist Consult Completed             Pertinent Test Results:       Condition on Discharge:  stable    Vital Signs       Physical Exam:        Discharge Disposition  Home-Health Care [facility]    Discharge Medications     Discharge Medications        New Medications        Instructions Start Date   oxyCODONE-acetaminophen 5-325 MG per tablet  Commonly known as: PERCOCET   1 tablet, Oral, Every 6 Hours PRN             Continue These Medications        Instructions Start Date   acetaminophen 500 MG tablet  Commonly known as: TYLENOL   500 mg, Oral, Every 6 Hours PRN      albuterol sulfate  (90 Base) MCG/ACT inhaler  Commonly known as: PROVENTIL HFA;VENTOLIN HFA;PROAIR HFA   1 puff, Inhalation, Every 6 Hours PRN      amLODIPine 10 MG tablet  Commonly known as: NORVASC   10 mg, Oral, Daily      aspirin 81 MG EC tablet   81 mg, Oral, Daily      hydrALAZINE 50 MG  tablet  Commonly known as: APRESOLINE   50 mg, Oral, 3 Times Daily      metoprolol tartrate 25 MG tablet  Commonly known as: LOPRESSOR   25 mg, Oral, 2 Times Daily      nitroglycerin 0.4 MG SL tablet  Commonly known as: NITROSTAT   0.4 mg, Sublingual, Every 5 Minutes PRN, Take no more than 3 doses in 15 minutes.      Ozempic (0.25 or 0.5 MG/DOSE) 2 MG/3ML solution pen-injector  Generic drug: Semaglutide(0.25 or 0.5MG/DOS)   INJECT 0.25 MG  SUBCUTANEOUSLY ONCE A WEEK      sertraline 100 MG tablet  Commonly known as: ZOLOFT   100 mg, Oral, Daily             ASK your doctor about these medications        Instructions Start Date   cefdinir 300 MG capsule  Commonly known as: OMNICEF  Ask about: Should I take this medication?   Take 1 capsule by mouth 2 Times a Day for 3 days.      doxycycline 100 MG capsule  Commonly known as: VIBRAMYCIN  Ask about: Should I take this medication?   Take 1 capsule by mouth 2 Times a Day for 3 days.      predniSONE 20 MG tablet  Commonly known as: DELTASONE  Ask about: Should I take this medication?   40 mg, Oral, Daily               Discharge Diet:   Diet Instructions    Fiber restricted, Soft to chew           Activity at Discharge:   Activity Instructions       Activity as tolerated              Follow-up Appointments  Future Appointments   Date Time Provider Department Center   8/19/2024  3:30 PM Peterson Taylor PA-C [facility]     Additional Instructions for the Follow-ups that You Need to Schedule       Ambulatory Referral to Home Health   As directed      Face to Face Visit Date: 8/9/2024   Follow-up provider for Plan of Care?: I treated the patient in an acute care facility and will not continue treatment after discharge.   Follow-up provider: KIRTI AYON [[id number]]   Reason/Clinical Findings: new colostomy and home oxygen   Describe mobility limitations that make leaving home difficult: weakness   Nursing/Therapeutic Services Requested: Skilled Nursing    Skilled nursing orders: O2 instruction Ostomy instruction   Frequency: 1 Week 1        Discharge Follow-up with PCP   As directed       Currently Documented PCP:    Rachell Rivers APRN    PCP Phone Number:    [phone]     Follow Up Details: 1 week        Discharge Follow-up with Specialty: 1 Week   As directed      Follow Up: 1 Week        Discharge Follow-up with Specified Provider: Dr Salazar; 1 Week   As directed      To: Dr Salazar   Follow Up: 1 Week                Test Results Pending at Discharge       Boogie Hays MD  08/15/24  14:28 EDT    Time:

## 2024-08-15 NOTE — PROGRESS NOTES
Subjective   Celestine Arriaga is a 58 y.o. male.     Chief Complaint: post sigmoid resection    History of Present Illness He is a obese 59 yo smoker who had a severely scarred in sigmoid colon with colovesical fistula a couple of weeks ago. He has a diverting colostomy and was discharged last week. Path just showed diverticular disease and scarring. He had the catheter removed 3 days ago. He does have ureteral stents in still. He says his urine is dark and he has some discomfort.    The following portions of the patient's history were reviewed and updated as appropriate: current medications, past family history, past medical history, past social history, past surgical history and problem list.    Review of Systems    Objective   Physical Exam wound ok, staples removed. Ostomy in RUQ had mir removed at wound care     Past Medical History:   Diagnosis Date    Arthritis     Chest pain     CHF (congestive heart failure)     COPD (chronic obstructive pulmonary disease)     Coronary artery disease     Diabetes mellitus     Elevated cholesterol     GERD (gastroesophageal reflux disease)     [place] (hard of hearing)     Hypertension     MI (myocardial infarction)     cardiac dr told him he had had a mild attack    Neuropathy     LOWER LEGS/FEET    Urinary tract infection        Family History   Problem Relation Age of Onset    No Known Problems Father     Cancer Mother        Social History     Tobacco Use    Smoking status: Every Day     Current packs/day: 1.50     Average packs/day: 1.5 packs/day for 40.6 years (60.9 ttl pk-yrs)     Types: Cigarettes     Start date: 1984     Passive exposure: Current    Smokeless tobacco: Former     Types: Chew   Vaping Use    Vaping status: Never Used   Substance Use Topics    Alcohol use: Not Currently     Alcohol/week: 2.0 standard drinks of alcohol     Types: 2 Shots of liquor per week     Comment: 2 mixed drinks a night    Drug use: Never       Past Surgical History:   Procedure  Laterality Date    APPENDECTOMY N/A 8/2/2024    Procedure: APPENDECTOMY;  Surgeon: Boogie Hays MD;  Location:  [facility];  Service: General;  Laterality: N/A;    BACK SURGERY      CARDIAC CATHETERIZATION      CARDIAC SURGERY      COLON RESECTION N/A 8/2/2024    Procedure: COLON RESECTION SIGMOID;  Surgeon: Boogie Hays MD;  Location:  [facility];  Service: General;  Laterality: N/A;    COLONOSCOPY      COLONOSCOPY N/A 07/18/2024    Procedure: COLONOSCOPY;  Surgeon: Boogie Hays MD;  Location:  [facility];  Service: Gastroenterology;  Laterality: N/A;    COLOSTOMY N/A 8/2/2024    Procedure: COLOSTOMY LOOP;  Surgeon: Boogie Hays MD;  Location:  [facility];  Service: General;  Laterality: N/A;    CORONARY ANGIOPLASTY WITH STENT PLACEMENT      3 STENTS TOTAL    CYSTOSCOPY W/ URETERAL STENT PLACEMENT Bilateral 8/2/2024    Procedure: URETEROSCOPY URETERAL CATHETER/STENT INSERTION;  Surgeon: Boogie Hays MD;  Location:  [facility];  Service: General;  Laterality: Bilateral;    EYE SURGERY Right     TOE AMPUTATION Left     great toe in  accident       Current Outpatient Medications   Medication Instructions    acetaminophen (TYLENOL) 500 mg, Oral, Every 6 Hours PRN    albuterol sulfate  (90 Base) MCG/ACT inhaler 1 puff, Inhalation, Every 6 Hours PRN    amLODIPine (NORVASC) 10 mg, Oral, Daily    aspirin 81 mg, Oral, Daily    hydrALAZINE (APRESOLINE) 50 mg, Oral, 3 Times Daily    metoprolol tartrate (LOPRESSOR) 25 mg, Oral, 2 Times Daily    nitroglycerin (NITROSTAT) 0.4 mg, Sublingual, Every 5 Minutes PRN, Take no more than 3 doses in 15 minutes.    oxyCODONE-acetaminophen (PERCOCET) 5-325 MG per tablet 1 tablet, Oral, Every 6 Hours PRN    Ozempic, 0.25 or 0.5 MG/DOSE, 2 MG/3ML solution pen-injector INJECT 0.25 MG  SUBCUTANEOUSLY ONCE A WEEK    sertraline (ZOLOFT) 100 mg, Oral, Daily         Assessment & Plan   Diagnoses and all orders for this visit:    1. Colovesical fistula (Primary)    2.  Status post colon resection    Return 1 mo  Check a UA           This document has been electronically signed by Boogie Hays MD   August 15, 2024 14:04 EDT

## 2024-08-16 LAB
BACTERIA UR QL AUTO: ABNORMAL /HPF
BILIRUB UR QL STRIP: NEGATIVE
CLARITY UR: ABNORMAL
COLOR UR: ABNORMAL
GLUCOSE UR STRIP-MCNC: NEGATIVE MG/DL
HGB UR QL STRIP.AUTO: ABNORMAL
HOLD SPECIMEN: NORMAL
HYALINE CASTS UR QL AUTO: ABNORMAL /LPF
KETONES UR QL STRIP: NEGATIVE
LEUKOCYTE ESTERASE UR QL STRIP.AUTO: ABNORMAL
NITRITE UR QL STRIP: NEGATIVE
PH UR STRIP.AUTO: 6 [PH] (ref 5–8)
PROT UR QL STRIP: ABNORMAL
RBC # UR STRIP: ABNORMAL /HPF
REF LAB TEST METHOD: ABNORMAL
SP GR UR STRIP: 1.02 (ref 1–1.03)
SQUAMOUS #/AREA URNS HPF: ABNORMAL /HPF
UROBILINOGEN UR QL STRIP: ABNORMAL
WBC # UR STRIP: ABNORMAL /HPF

## 2024-08-16 RX ORDER — SULFAMETHOXAZOLE AND TRIMETHOPRIM 800; 160 MG/1; MG/1
1 TABLET ORAL 2 TIMES DAILY
Qty: 20 TABLET | Refills: 0 | Status: SHIPPED | OUTPATIENT
Start: 2024-08-16

## 2024-08-17 LAB — BACTERIA SPEC AEROBE CULT: ABNORMAL

## 2024-08-19 ENCOUNTER — TELEPHONE (OUTPATIENT)
Dept: SURGERY | Facility: CLINIC | Age: 59
End: 2024-08-19
Payer: COMMERCIAL

## 2024-08-19 DIAGNOSIS — B37.9 YEAST INFECTION: Primary | ICD-10-CM

## 2024-08-19 RX ORDER — FLUCONAZOLE 100 MG/1
100 TABLET ORAL DAILY
Qty: 10 TABLET | Refills: 0 | Status: SHIPPED | OUTPATIENT
Start: 2024-08-19 | End: 2024-08-29

## 2024-08-19 NOTE — TELEPHONE ENCOUNTER
Per Dr. Hays the patient was contacted to be informed that his UA showed a yeast infection and that a Rx of Diflucan 100 mg was sent to his pharmacy to be taken daily for 10 days.

## 2024-08-21 ENCOUNTER — OFFICE VISIT (OUTPATIENT)
Dept: UROLOGY | Facility: CLINIC | Age: 59
End: 2024-08-21
Payer: COMMERCIAL

## 2024-08-21 VITALS
SYSTOLIC BLOOD PRESSURE: 189 MMHG | WEIGHT: 247 LBS | DIASTOLIC BLOOD PRESSURE: 92 MMHG | HEART RATE: 78 BPM | BODY MASS INDEX: 35.36 KG/M2 | HEIGHT: 70 IN

## 2024-08-21 DIAGNOSIS — N32.1 COLOVESICAL FISTULA: Primary | ICD-10-CM

## 2024-08-21 DIAGNOSIS — R35.0 FREQUENCY OF MICTURITION: ICD-10-CM

## 2024-08-21 LAB
BILIRUB BLD-MCNC: NEGATIVE MG/DL
CLARITY, POC: ABNORMAL
COLOR UR: ABNORMAL
EXPIRATION DATE: ABNORMAL
GLUCOSE UR STRIP-MCNC: NEGATIVE MG/DL
KETONES UR QL: NEGATIVE
LEUKOCYTE EST, POC: ABNORMAL
Lab: ABNORMAL
NITRITE UR-MCNC: NEGATIVE MG/ML
PH UR: 6.5 [PH] (ref 5–8)
PROT UR STRIP-MCNC: ABNORMAL MG/DL
RBC # UR STRIP: ABNORMAL /UL
SP GR UR: 1.01 (ref 1–1.03)
UROBILINOGEN UR QL: NORMAL

## 2024-08-21 PROCEDURE — 80048 BASIC METABOLIC PNL TOTAL CA: CPT

## 2024-08-21 PROCEDURE — 87086 URINE CULTURE/COLONY COUNT: CPT

## 2024-08-21 NOTE — PROGRESS NOTES
"Chief Complaint:    Chief Complaint   Patient presents with    BPH with obstruction/lower urinary tract symptoms     Voiding Trial / follow up        Vital Signs:   BP (!) 189/92   Pulse 78   Ht 177.8 cm (70\")   Wt 112 kg (247 lb)   BMI 35.44 kg/m²   Body mass index is 35.44 kg/m².      HPI:  Celestine Arriaga is a 58 y.o. male who presents today for follow up    History of Present Illness  Mr. Arriaga presents to the clinic for a follow-up for recurrent urinary tract infection and colovesicular fistula.  He was seen in office 1 week ago and was status post bilateral stent placements and colovesicular fistula repair by Dr. Salazar and Dr. Hays on 8/2/2024.  His indwelling Benton catheter was removed at that time for a voiding trial.  He did follow-up with Dr. Hays on 8/15/2024 and had concerns for an acute urinary tract infection.  His urine was sent off for culture at that time and grew yeast only.  He is currently taking Bactrim.  He does endorse cloudy brown urinary output.  Urine analysis in office today shows 3+ leukocytes, 2+ protein and, and 3+ blood.  He does endorse some mild dysuria, flank pain, frequency, and back pain.  Postvoid residual was very minimal.      Past Medical History:  Past Medical History:   Diagnosis Date    Arthritis     Chest pain     CHF (congestive heart failure)     COPD (chronic obstructive pulmonary disease)     Coronary artery disease     Diabetes mellitus     Elevated cholesterol     GERD (gastroesophageal reflux disease)     [place] (hard of hearing)     Hypertension     MI (myocardial infarction)     cardiac dr told him he had had a mild attack    Neuropathy     LOWER LEGS/FEET    Urinary tract infection        Current Meds:  Current Outpatient Medications   Medication Sig Dispense Refill    acetaminophen (TYLENOL) 500 MG tablet Take 1 tablet by mouth Every 6 (Six) Hours As Needed for Mild Pain.      albuterol sulfate  (90 Base) MCG/ACT inhaler Inhale 1 puff Every 6 (Six) " Hours As Needed for Wheezing.      amLODIPine (NORVASC) 10 MG tablet Take 1 tablet by mouth Daily.      aspirin 81 MG EC tablet Take 1 tablet by mouth Daily.      fluconazole (Diflucan) 100 MG tablet Take 1 tablet by mouth Daily for 10 days. 10 tablet 0    hydrALAZINE (APRESOLINE) 50 MG tablet Take 1 tablet by mouth 3 (Three) Times a Day.      metoprolol tartrate (LOPRESSOR) 25 MG tablet Take 1 tablet by mouth 2 (Two) Times a Day.      nitroglycerin (NITROSTAT) 0.4 MG SL tablet Place 1 tablet under the tongue Every 5 (Five) Minutes As Needed for Chest Pain. Take no more than 3 doses in 15 minutes.      oxyCODONE-acetaminophen (PERCOCET) 5-325 MG per tablet Take 1 tablet by mouth Every 6 (Six) Hours As Needed for Moderate Pain. 15 tablet 0    Ozempic, 0.25 or 0.5 MG/DOSE, 2 MG/3ML solution pen-injector INJECT 0.25 MG  SUBCUTANEOUSLY ONCE A WEEK      sertraline (ZOLOFT) 100 MG tablet Take 1 tablet by mouth Daily.      sulfamethoxazole-trimethoprim (Bactrim DS) 800-160 MG per tablet Take 1 tablet by mouth 2 Times a Day. 20 tablet 0     No current facility-administered medications for this visit.        Allergies:   Allergies   Allergen Reactions    Lisinopril Swelling        Past Surgical History:  Past Surgical History:   Procedure Laterality Date    APPENDECTOMY N/A 8/2/2024    Procedure: APPENDECTOMY;  Surgeon: Boogie Hays MD;  Location: [facility];  Service: General;  Laterality: N/A;    BACK SURGERY      CARDIAC CATHETERIZATION      CARDIAC SURGERY      COLON RESECTION N/A 8/2/2024    Procedure: COLON RESECTION SIGMOID;  Surgeon: Boogie Hays MD;  Location: [facility] OR;  Service: General;  Laterality: N/A;    COLONOSCOPY      COLONOSCOPY N/A 07/18/2024    Procedure: COLONOSCOPY;  Surgeon: Boogie Hays MD;  Location: [facility] OR;  Service: Gastroenterology;  Laterality: N/A;    COLOSTOMY N/A 8/2/2024    Procedure: COLOSTOMY LOOP;  Surgeon: Boogie Hays MD;  Location: [facility] OR;  Service: General;   Laterality: N/A;    CORONARY ANGIOPLASTY WITH STENT PLACEMENT      3 STENTS TOTAL    CYSTOSCOPY W/ URETERAL STENT PLACEMENT Bilateral 8/2/2024    Procedure: URETEROSCOPY URETERAL CATHETER/STENT INSERTION;  Surgeon: Boogie Hays MD;  Location: [facility];  Service: General;  Laterality: Bilateral;    EYE SURGERY Right     TOE AMPUTATION Left     great toe in  accident       Social History:  Social History     Socioeconomic History    Marital status:    Tobacco Use    Smoking status: Every Day     Current packs/day: 1.50     Average packs/day: 1.5 packs/day for 40.6 years (61.0 ttl pk-yrs)     Types: Cigarettes     Start date: 1984     Passive exposure: Current    Smokeless tobacco: Former     Types: Chew   Vaping Use    Vaping status: Never Used   Substance and Sexual Activity    Alcohol use: Not Currently     Alcohol/week: 2.0 standard drinks of alcohol     Types: 2 Shots of liquor per week     Comment: 2 mixed drinks a night    Drug use: Never    Sexual activity: Defer     Birth control/protection: None       Family History:  Family History   Problem Relation Age of Onset    No Known Problems Father     Cancer Mother        Review of Systems:  Review of Systems   Constitutional:  Positive for fatigue. Negative for chills and fever.   HENT:  Negative for congestion and sinus pressure.    Respiratory:  Negative for chest tightness and shortness of breath.    Cardiovascular:  Negative for chest pain.   Gastrointestinal:  Negative for abdominal pain, constipation, diarrhea, nausea and vomiting.   Genitourinary:  Positive for dysuria, flank pain and frequency. Negative for difficulty urinating, hematuria and urgency.   Musculoskeletal:  Positive for back pain. Negative for neck pain.   Skin:  Negative for rash.   Allergic/Immunologic: Negative for food allergies.   Neurological:  Negative for dizziness and headaches.   Hematological:  Bruises/bleeds easily.   Psychiatric/Behavioral:  The patient is  not nervous/anxious.        Physical Exam:  Physical Exam  Constitutional:       General: He is not in acute distress.     Appearance: Normal appearance.   HENT:      Head: Normocephalic and atraumatic.      Nose: Nose normal.      Mouth/Throat:      Mouth: Mucous membranes are moist.   Eyes:      Conjunctiva/sclera: Conjunctivae normal.   Cardiovascular:      Rate and Rhythm: Normal rate and regular rhythm.      Pulses: Normal pulses.      Heart sounds: Normal heart sounds.   Pulmonary:      Effort: Pulmonary effort is normal.      Breath sounds: Normal breath sounds.   Abdominal:      General: Bowel sounds are normal.      Palpations: Abdomen is soft.   Musculoskeletal:         General: Normal range of motion.      Cervical back: Normal range of motion.   Skin:     General: Skin is warm.   Neurological:      General: No focal deficit present.      Mental Status: He is alert and oriented to person, place, and time.   Psychiatric:         Mood and Affect: Mood normal.         Behavior: Behavior normal.         Thought Content: Thought content normal.         Judgment: Judgment normal.         Recent Image (CT and/or KUB):   CT Abdomen and Pelvis: No results found for this or any previous visit.     CT Stone Protocol: No results found for this or any previous visit.     KUB: No results found for this or any previous visit.       Labs:  Brief Urine Lab Results  (Last result in the past 365 days)        Color   Clarity   Blood   Leuk Est   Nitrite   Protein   CREAT   Urine HCG        08/21/24 1033 Dark Yellow   Cloudy   3+   Large (3+)   Negative   2+                 Office Visit on 08/21/2024   Component Date Value Ref Range Status    Color 08/21/2024 Dark Yellow  Yellow, Straw, Dark Yellow, Gayathri Final    Clarity, UA 08/21/2024 Cloudy (A)  Clear Final    Specific Gravity  08/21/2024 1.015  1.005 - 1.030 Final    pH, Urine 08/21/2024 6.5  5.0 - 8.0 Final    Leukocytes 08/21/2024 Large (3+) (A)  Negative Final     Nitrite, UA 08/21/2024 Negative  Negative Final    Protein, POC 08/21/2024 2+ (A)  Negative mg/dL Final    Glucose, UA 08/21/2024 Negative  Negative mg/dL Final    Ketones, UA 08/21/2024 Negative  Negative Final    Urobilinogen, UA 08/21/2024 Normal  Normal, 0.2 E.U./dL Final    Bilirubin 08/21/2024 Negative  Negative Final    Blood, UA 08/21/2024 3+ (A)  Negative Final    Lot Number 08/21/2024 n   Final    Expiration Date 08/21/2024 n   Final   Lab on 08/15/2024   Component Date Value Ref Range Status    Color, UA 08/15/2024 Red (A)  Yellow, Straw Final    Any Substance that causes an abnormal urine color can alter the accuracy of the chemical reactions.    Appearance, UA 08/15/2024 Turbid (A)  Clear Final    pH, UA 08/15/2024 6.0  5.0 - 8.0 Final    Specific Gravity, UA 08/15/2024 1.020  1.005 - 1.030 Final    Glucose, UA 08/15/2024 Negative  Negative Final    Ketones, UA 08/15/2024 Negative  Negative Final    Bilirubin, UA 08/15/2024 Negative  Negative Final    Blood, UA 08/15/2024 Large (3+) (A)  Negative Final    Protein, UA 08/15/2024 >=300 mg/dL (3+) (A)  Negative Final    Leuk Esterase, UA 08/15/2024 Large (3+) (A)  Negative Final    Nitrite, UA 08/15/2024 Negative  Negative Final    Urobilinogen, UA 08/15/2024 0.2 E.U./dL  0.2 - 1.0 E.U./dL Final    Extra Tube 08/15/2024 Hold for add-ons.   Final    Auto resulted.    RBC, UA 08/15/2024 Too Numerous to Count (A)  None Seen, 0-2 /HPF Final    WBC, UA 08/15/2024 Too Numerous to Count (A)  None Seen, 0-2 /HPF Final    Bacteria, UA 08/15/2024 None Seen  None Seen /HPF Final    Squamous Epithelial Cells, UA 08/15/2024 0-2  None Seen, 0-2 /HPF Final    Hyaline Casts, UA 08/15/2024 3-6  None Seen /LPF Final    Methodology 08/15/2024 Automated Microscopy   Final    Urine Culture 08/15/2024 Yeast isolated (A)   Final    No further workup.     Admission on 08/02/2024, Discharged on 08/09/2024   Component Date Value Ref Range Status    Glucose 08/02/2024 138 (H)  70 -  "130 mg/dL Final    Reference Lab Report 2024    Final                    Value:[facility]  [address] [place] [address]  Phone: [phone] or [phone]  Fax: [phone]  Vinicius Gong M.D., Medical Director    PATIENT NAME                           LABORATORY NO.  [id number]  TEDDY COOK                         [id number]  [id number]                         AGE              SEX  SSN           CLIENT REF #  [facility] [place]              58      1965      [id number]   [id number]    [address]                     REQUESTING M.D.     [place] [address]                   LUCY BAIRES  DATE COLLECTED      DATE RECEIVED      DATE REPORTED  2024    DIAGNOSIS:  A.   APPENDIX:  Fibrous obliteration of the appendiceal tip  Incidental periappendiceal benign cyst  No identified dysplasia or malignancy  B.   COLON, RESECTION, SIGMOID COLON:  Diverticulosis coli with associated intramural abscesses  Acute serositis and serosal fibrous                           adhesions  2 benign lymph nodes  Examined margins of resection are viable  No identified dysplasia or malignancy    CLINICAL HISTORY:  Colovesical fistula    SPECIMENS RECEIVED:  A.  APPENDIX  B.  COLON, RESECTION, SIGMOID COLON    MICROSCOPIC DESCRIPTION:  A.  Tissue blocks are prepared and slides are examined microscopically on all  specimens. See diagnosis for details.  B.  Tissue blocks are prepared and slides are examined microscopically on all  specimens. See diagnosis for details.    Professional interpretation rendered by Destiny Carpenter M.D. at [facility],  [place], [address] [place] [address], [place] [address].    GROSS DESCRIPTION:  A.  Received in formalin labeled \"appendix\" is a 6 cm long by 0.8 cm in  diameter intact appendix with a moderate amount of attached  mesoappendix.  The serosa is smooth and glistening and has a 0.3 cm " "tan  nodule located 0.7 cm from the proximal margin.  The proximal margin is  inked black and sectioning reveals a 0.4 cm in average diameter lumen  filled                           with soft, green fecal material.  The mucosa is tan and glistening and  the unremarkable tan appendiceal wall averages 0.2 cm thick.  No masses  or fecaliths are identified.  Representative sections to include the en face  proximal margin, half of the bisected tip and cross-section to include  serosal nodule are submitted in block A1.  B.  Received in formalin labeled \"sigmoid colon\" is a 13.5 cm long by 2.5 cm  in average diameter intact, unopened and tortuous segment of sigmoid  colon encased in mildly indurated pericolonic fat.  The serosa is gray-tan  and dusky with dense adhesions.  The lumen contains a minimal amount of  soft, brown fecal material and the mucosa is tan and glistening with normal  folds.  Sectioning reveals a diffusely thickened bowel wall averaging 1 cm  thick.  Located at least 3 cm from the mucosal margins is a red-brown  fistula tract.  Located approximately 2 cm from the fistula is a possible  perforation and corresponding abscess formation.  No polyps or                           invasive  lesions are identified.  There are no enlarged lymph nodes or mesenteric  masses identified.  Representative sections are submitted as follows: B1:  Proximal mucosal margin en face; B2-B3: Distal mucosal margin, en face;  B4-B5: Fistula; B6: Possible perforation.  HDM    REVIEWED, DIAGNOSED AND ELECTRONICALLY  SIGNED BY:    Destiny Carpenter M.D.  CPT CODES:  15601, 16161      Glucose 08/02/2024 165 (H)  70 - 130 mg/dL Final    WBC 08/03/2024 20.58 (H)  3.40 - 10.80 10*3/mm3 Final    RBC 08/03/2024 3.89 (L)  4.14 - 5.80 10*6/mm3 Final    Hemoglobin 08/03/2024 12.9 (L)  13.0 - 17.7 g/dL Final    Hematocrit 08/03/2024 38.9  37.5 - 51.0 % Final    MCV 08/03/2024 100.0 (H)  79.0 - 97.0 fL Final    MCH 08/03/2024 33.2 (H)  26.6 - " 33.0 pg Final    MCHC 08/03/2024 33.2  31.5 - 35.7 g/dL Final    RDW 08/03/2024 15.4  12.3 - 15.4 % Final    RDW-SD 08/03/2024 57.1 (H)  37.0 - 54.0 fl Final    MPV 08/03/2024 10.2  6.0 - 12.0 fL Final    Platelets 08/03/2024 267  140 - 450 10*3/mm3 Final    Glucose 08/03/2024 132 (H)  65 - 99 mg/dL Final    BUN 08/03/2024 18  6 - 20 mg/dL Final    Creatinine 08/03/2024 1.26  0.76 - 1.27 mg/dL Final    Sodium 08/03/2024 139  136 - 145 mmol/L Final    Potassium 08/03/2024 4.5  3.5 - 5.2 mmol/L Final    Chloride 08/03/2024 104  98 - 107 mmol/L Final    CO2 08/03/2024 22.1  22.0 - 29.0 mmol/L Final    Calcium 08/03/2024 8.6  8.6 - 10.5 mg/dL Final    BUN/Creatinine Ratio 08/03/2024 14.3  7.0 - 25.0 Final    Anion Gap 08/03/2024 12.9  5.0 - 15.0 mmol/L Final    eGFR 08/03/2024 66.9  >60.0 mL/min/1.73 Final    C-Reactive Protein 08/03/2024 7.03 (H)  0.00 - 0.50 mg/dL Final    WBC 08/04/2024 16.24 (H)  3.40 - 10.80 10*3/mm3 Final    RBC 08/04/2024 3.70 (L)  4.14 - 5.80 10*6/mm3 Final    Hemoglobin 08/04/2024 12.0 (L)  13.0 - 17.7 g/dL Final    Hematocrit 08/04/2024 37.2 (L)  37.5 - 51.0 % Final    MCV 08/04/2024 100.5 (H)  79.0 - 97.0 fL Final    MCH 08/04/2024 32.4  26.6 - 33.0 pg Final    MCHC 08/04/2024 32.3  31.5 - 35.7 g/dL Final    RDW 08/04/2024 15.3  12.3 - 15.4 % Final    RDW-SD 08/04/2024 56.3 (H)  37.0 - 54.0 fl Final    MPV 08/04/2024 10.2  6.0 - 12.0 fL Final    Platelets 08/04/2024 240  140 - 450 10*3/mm3 Final    Glucose 08/04/2024 112 (H)  65 - 99 mg/dL Final    BUN 08/04/2024 16  6 - 20 mg/dL Final    Creatinine 08/04/2024 1.10  0.76 - 1.27 mg/dL Final    Sodium 08/04/2024 135 (L)  136 - 145 mmol/L Final    Potassium 08/04/2024 3.9  3.5 - 5.2 mmol/L Final    Slight hemolysis detected by analyzer. Result may be falsely elevated.    Chloride 08/04/2024 102  98 - 107 mmol/L Final    CO2 08/04/2024 21.2 (L)  22.0 - 29.0 mmol/L Final    Calcium 08/04/2024 8.4 (L)  8.6 - 10.5 mg/dL Final    BUN/Creatinine Ratio  08/04/2024 14.5  7.0 - 25.0 Final    Anion Gap 08/04/2024 11.8  5.0 - 15.0 mmol/L Final    eGFR 08/04/2024 78.8  >60.0 mL/min/1.73 Final    Magnesium 08/04/2024 1.8  1.6 - 2.6 mg/dL Final    Phosphorus 08/04/2024 3.0  2.5 - 4.5 mg/dL Final    WBC 08/05/2024 17.32 (H)  3.40 - 10.80 10*3/mm3 Final    RBC 08/05/2024 3.55 (L)  4.14 - 5.80 10*6/mm3 Final    Hemoglobin 08/05/2024 11.6 (L)  13.0 - 17.7 g/dL Final    Hematocrit 08/05/2024 36.0 (L)  37.5 - 51.0 % Final    MCV 08/05/2024 101.4 (H)  79.0 - 97.0 fL Final    MCH 08/05/2024 32.7  26.6 - 33.0 pg Final    MCHC 08/05/2024 32.2  31.5 - 35.7 g/dL Final    RDW 08/05/2024 15.1  12.3 - 15.4 % Final    RDW-SD 08/05/2024 57.0 (H)  37.0 - 54.0 fl Final    MPV 08/05/2024 10.0  6.0 - 12.0 fL Final    Platelets 08/05/2024 256  140 - 450 10*3/mm3 Final    Glucose 08/05/2024 120 (H)  65 - 99 mg/dL Final    BUN 08/05/2024 10  6 - 20 mg/dL Final    Creatinine 08/05/2024 0.82  0.76 - 1.27 mg/dL Final    Sodium 08/05/2024 137  136 - 145 mmol/L Final    Potassium 08/05/2024 4.0  3.5 - 5.2 mmol/L Final    Chloride 08/05/2024 107  98 - 107 mmol/L Final    CO2 08/05/2024 20.6 (L)  22.0 - 29.0 mmol/L Final    Calcium 08/05/2024 9.2  8.6 - 10.5 mg/dL Final    BUN/Creatinine Ratio 08/05/2024 12.2  7.0 - 25.0 Final    Anion Gap 08/05/2024 9.4  5.0 - 15.0 mmol/L Final    eGFR 08/05/2024 103.1  >60.0 mL/min/1.73 Final    C-Reactive Protein 08/05/2024 26.37 (H)  0.00 - 0.50 mg/dL Final    WBC 08/06/2024 15.60 (H)  3.40 - 10.80 10*3/mm3 Final    RBC 08/06/2024 3.89 (L)  4.14 - 5.80 10*6/mm3 Final    Hemoglobin 08/06/2024 12.5 (L)  13.0 - 17.7 g/dL Final    Hematocrit 08/06/2024 38.5  37.5 - 51.0 % Final    MCV 08/06/2024 99.0 (H)  79.0 - 97.0 fL Final    MCH 08/06/2024 32.1  26.6 - 33.0 pg Final    MCHC 08/06/2024 32.5  31.5 - 35.7 g/dL Final    RDW 08/06/2024 15.2  12.3 - 15.4 % Final    RDW-SD 08/06/2024 54.7 (H)  37.0 - 54.0 fl Final    MPV 08/06/2024 10.3  6.0 - 12.0 fL Final    Platelets  08/06/2024 297  140 - 450 10*3/mm3 Final    C-Reactive Protein 08/06/2024 19.34 (H)  0.00 - 0.50 mg/dL Final    WBC 08/07/2024 17.34 (H)  3.40 - 10.80 10*3/mm3 Final    RBC 08/07/2024 3.96 (L)  4.14 - 5.80 10*6/mm3 Final    Hemoglobin 08/07/2024 12.8 (L)  13.0 - 17.7 g/dL Final    Hematocrit 08/07/2024 40.4  37.5 - 51.0 % Final    MCV 08/07/2024 102.0 (H)  79.0 - 97.0 fL Final    MCH 08/07/2024 32.3  26.6 - 33.0 pg Final    MCHC 08/07/2024 31.7  31.5 - 35.7 g/dL Final    RDW 08/07/2024 15.1  12.3 - 15.4 % Final    RDW-SD 08/07/2024 56.9 (H)  37.0 - 54.0 fl Final    MPV 08/07/2024 9.9  6.0 - 12.0 fL Final    Platelets 08/07/2024 320  140 - 450 10*3/mm3 Final    C-Reactive Protein 08/07/2024 13.52 (H)  0.00 - 0.50 mg/dL Final    Procalcitonin 08/07/2024 0.16  0.00 - 0.25 ng/mL Final    proBNP 08/07/2024 332.6  0.0 - 900.0 pg/mL Final    Site 08/07/2024 Right Radial   Final    Jesús's Test 08/07/2024 Positive   Final    pH, Arterial 08/07/2024 7.441  7.350 - 7.450 pH units Final    pCO2, Arterial 08/07/2024 28.9 (L)  35.0 - 45.0 mm Hg Final    84 Value below reference range    pO2, Arterial 08/07/2024 63.0 (L)  83.0 - 108.0 mm Hg Final    84 Value below reference range    HCO3, Arterial 08/07/2024 19.7 (L)  20.0 - 26.0 mmol/L Final    84 Value below reference range    Base Excess, Arterial 08/07/2024 -3.3 (L)  0.0 - 2.0 mmol/L Final    O2 Saturation, Arterial 08/07/2024 92.7 (L)  94.0 - 99.0 % Final    84 Value below reference range    Hemoglobin, Blood Gas 08/07/2024 12.9 (L)  14 - 18 g/dL Final    84 Value below reference range    Hematocrit, Blood Gas 08/07/2024 39.6  38.0 - 51.0 % Final    Oxyhemoglobin 08/07/2024 91.3 (L)  94 - 99 % Final    84 Value below reference range    Methemoglobin 08/07/2024 0.20  0.00 - 3.00 % Final    Carboxyhemoglobin 08/07/2024 1.3  0 - 5 % Final    A-a DO2 08/07/2024 148.6  0.0 - 300.0 mmHg Final    CO2 Content 08/07/2024 20.6 (L)  22 - 33 mmol/L Final    Barometric Pressure for Blood  Gas 08/07/2024 723  mmHg Final    Modality 08/07/2024 Nasal Cannula   Final    FIO2 08/07/2024 36  % Final    Flow Rate 08/07/2024 4.0  lpm Final    Ventilator Mode 08/07/2024 NA   Final    Collected by 08/07/2024 [id number]   Final    Meter: [id number]     :  [id number]    Respiratory Culture 08/07/2024 Moderate growth (3+) Normal respiratory daryn. No S. aureus or Pseudomonas aeruginosa detected. Final report.   Final    Gram Stain 08/07/2024 Few (2+) Epithelial cells seen   Final    Gram Stain 08/07/2024 Moderate (3+) WBCs seen   Final    Gram Stain 08/07/2024 Many (4+) Mixed bacterial morphotypes seen on Gram Stain   Final    C-Reactive Protein 08/08/2024 10.00 (H)  0.00 - 0.50 mg/dL Final    Glucose 08/08/2024 175 (H)  65 - 99 mg/dL Final    BUN 08/08/2024 16  6 - 20 mg/dL Final    Creatinine 08/08/2024 0.85  0.76 - 1.27 mg/dL Final    Sodium 08/08/2024 136  136 - 145 mmol/L Final    Potassium 08/08/2024 4.3  3.5 - 5.2 mmol/L Final    Chloride 08/08/2024 106  98 - 107 mmol/L Final    CO2 08/08/2024 17.1 (L)  22.0 - 29.0 mmol/L Final    Calcium 08/08/2024 9.2  8.6 - 10.5 mg/dL Final    BUN/Creatinine Ratio 08/08/2024 18.8  7.0 - 25.0 Final    Anion Gap 08/08/2024 12.9  5.0 - 15.0 mmol/L Final    eGFR 08/08/2024 102.0  >60.0 mL/min/1.73 Final    WBC 08/08/2024 15.36 (H)  3.40 - 10.80 10*3/mm3 Final    RBC 08/08/2024 3.96 (L)  4.14 - 5.80 10*6/mm3 Final    Hemoglobin 08/08/2024 13.1  13.0 - 17.7 g/dL Final    Hematocrit 08/08/2024 39.5  37.5 - 51.0 % Final    MCV 08/08/2024 99.7 (H)  79.0 - 97.0 fL Final    MCH 08/08/2024 33.1 (H)  26.6 - 33.0 pg Final    MCHC 08/08/2024 33.2  31.5 - 35.7 g/dL Final    RDW 08/08/2024 14.8  12.3 - 15.4 % Final    RDW-SD 08/08/2024 54.9 (H)  37.0 - 54.0 fl Final    MPV 08/08/2024 10.7  6.0 - 12.0 fL Final    Platelets 08/08/2024 393  140 - 450 10*3/mm3 Final    Neutrophil % 08/08/2024 86.9 (H)  42.7 - 76.0 % Final    Lymphocyte % 08/08/2024 9.0 (L)  19.6 - 45.3 % Final     Monocyte % 08/08/2024 2.7 (L)  5.0 - 12.0 % Final    Eosinophil % 08/08/2024 0.1 (L)  0.3 - 6.2 % Final    Basophil % 08/08/2024 0.3  0.0 - 1.5 % Final    Immature Grans % 08/08/2024 1.0 (H)  0.0 - 0.5 % Final    Neutrophils, Absolute 08/08/2024 13.33 (H)  1.70 - 7.00 10*3/mm3 Final    Lymphocytes, Absolute 08/08/2024 1.39  0.70 - 3.10 10*3/mm3 Final    Monocytes, Absolute 08/08/2024 0.42  0.10 - 0.90 10*3/mm3 Final    Eosinophils, Absolute 08/08/2024 0.01  0.00 - 0.40 10*3/mm3 Final    Basophils, Absolute 08/08/2024 0.05  0.00 - 0.20 10*3/mm3 Final    Immature Grans, Absolute 08/08/2024 0.16 (H)  0.00 - 0.05 10*3/mm3 Final    nRBC 08/08/2024 0.0  0.0 - 0.2 /100 WBC Final    WBC 08/09/2024 23.38 (H)  3.40 - 10.80 10*3/mm3 Final    RBC 08/09/2024 3.96 (L)  4.14 - 5.80 10*6/mm3 Final    Hemoglobin 08/09/2024 12.9 (L)  13.0 - 17.7 g/dL Final    Hematocrit 08/09/2024 39.6  37.5 - 51.0 % Final    MCV 08/09/2024 100.0 (H)  79.0 - 97.0 fL Final    MCH 08/09/2024 32.6  26.6 - 33.0 pg Final    MCHC 08/09/2024 32.6  31.5 - 35.7 g/dL Final    RDW 08/09/2024 15.1  12.3 - 15.4 % Final    RDW-SD 08/09/2024 56.1 (H)  37.0 - 54.0 fl Final    MPV 08/09/2024 10.8  6.0 - 12.0 fL Final    Platelets 08/09/2024 449  140 - 450 10*3/mm3 Final    Neutrophil % 08/09/2024 79.7 (H)  42.7 - 76.0 % Final    Lymphocyte % 08/09/2024 10.9 (L)  19.6 - 45.3 % Final    Monocyte % 08/09/2024 7.0  5.0 - 12.0 % Final    Eosinophil % 08/09/2024 0.0 (L)  0.3 - 6.2 % Final    Basophil % 08/09/2024 0.3  0.0 - 1.5 % Final    Immature Grans % 08/09/2024 2.1 (H)  0.0 - 0.5 % Final    Neutrophils, Absolute 08/09/2024 18.62 (H)  1.70 - 7.00 10*3/mm3 Final    Lymphocytes, Absolute 08/09/2024 2.55  0.70 - 3.10 10*3/mm3 Final    Monocytes, Absolute 08/09/2024 1.64 (H)  0.10 - 0.90 10*3/mm3 Final    Eosinophils, Absolute 08/09/2024 0.00  0.00 - 0.40 10*3/mm3 Final    Basophils, Absolute 08/09/2024 0.07  0.00 - 0.20 10*3/mm3 Final    Immature Grans, Absolute  08/09/2024 0.50 (H)  0.00 - 0.05 10*3/mm3 Final    nRBC 08/09/2024 0.0  0.0 - 0.2 /100 WBC Final   Pre-Admission Testing on 07/31/2024   Component Date Value Ref Range Status    Glucose 07/31/2024 113 (H)  65 - 99 mg/dL Final    BUN 07/31/2024 11  6 - 20 mg/dL Final    Creatinine 07/31/2024 0.83  0.76 - 1.27 mg/dL Final    Sodium 07/31/2024 142  136 - 145 mmol/L Final    Potassium 07/31/2024 3.9  3.5 - 5.2 mmol/L Final    Chloride 07/31/2024 104  98 - 107 mmol/L Final    CO2 07/31/2024 20.9 (L)  22.0 - 29.0 mmol/L Final    Calcium 07/31/2024 9.8  8.6 - 10.5 mg/dL Final    BUN/Creatinine Ratio 07/31/2024 13.3  7.0 - 25.0 Final    Anion Gap 07/31/2024 17.1 (H)  5.0 - 15.0 mmol/L Final    eGFR 07/31/2024 102.7  >60.0 mL/min/1.73 Final    WBC 07/31/2024 10.97 (H)  3.40 - 10.80 10*3/mm3 Final    RBC 07/31/2024 4.85  4.14 - 5.80 10*6/mm3 Final    Hemoglobin 07/31/2024 15.7  13.0 - 17.7 g/dL Final    Hematocrit 07/31/2024 47.2  37.5 - 51.0 % Final    MCV 07/31/2024 97.3 (H)  79.0 - 97.0 fL Final    MCH 07/31/2024 32.4  26.6 - 33.0 pg Final    MCHC 07/31/2024 33.3  31.5 - 35.7 g/dL Final    RDW 07/31/2024 15.3  12.3 - 15.4 % Final    RDW-SD 07/31/2024 55.2 (H)  37.0 - 54.0 fl Final    MPV 07/31/2024 10.1  6.0 - 12.0 fL Final    Platelets 07/31/2024 304  140 - 450 10*3/mm3 Final    QT Interval 07/31/2024 402  ms Final    QTC Interval 07/31/2024 448  ms Final   Admission on 07/18/2024, Discharged on 07/18/2024   Component Date Value Ref Range Status    Glucose 07/18/2024 138 (H)  70 - 130 mg/dL Final   Office Visit on 06/11/2024   Component Date Value Ref Range Status    Color 06/11/2024 Gayathri  Yellow, Straw, Dark Yellow, Gayathri Final    Clarity, UA 06/11/2024 Clear  Clear Final    Specific Gravity  06/11/2024 1.010  1.005 - 1.030 Final    pH, Urine 06/11/2024 7.5  5.0 - 8.0 Final    Leukocytes 06/11/2024 Moderate (2+) (A)  Negative Final    Nitrite, UA 06/11/2024 Negative  Negative Final    Protein, POC 06/11/2024 1+ (A)   Negative mg/dL Final    Glucose, UA 06/11/2024 Negative  Negative mg/dL Final    Ketones, UA 06/11/2024 Trace (A)  Negative Final    Urobilinogen, UA 06/11/2024 Normal  Normal, 0.2 E.U./dL Final    Bilirubin 06/11/2024 Small (1+) (A)  Negative Final    Blood, UA 06/11/2024 Trace (A)  Negative Final    Lot Number 06/11/2024 [id number]   Final    Expiration Date 06/11/2024 10/25/24   Final    Urine Culture 06/11/2024 No growth   Final    PSA 06/11/2024 0.8  0.0 - 4.0 ng/mL Final    Roche ECLIA methodology.  According to the American Urological Association, Serum PSA should  decrease and remain at undetectable levels after radical  prostatectomy. The AUA defines biochemical recurrence as an initial  PSA value 0.2 ng/mL or greater followed by a subsequent confirmatory  PSA value 0.2 ng/mL or greater.  Values obtained with different assay methods or kits cannot be used  interchangeably. Results cannot be interpreted as absolute evidence  of the presence or absence of malignant disease.    PSA, Free 06/11/2024 0.06  N/A ng/mL Final    Roche ECLIA methodology.    PSA, Free % 06/11/2024 7.5  % Final    The table below lists the probability of prostate cancer for  men with non-suspicious CHAGO results and total PSA between  4 and 10 ng/mL, by patient age (Romario et al, AMDDIE 1998,  279:1542).                    % Free PSA       50-64 yr        65-75 yr                    0.00-10.00%        56%             55%                   10.01-15.00%        24%             35%                   15.01-20.00%        17%             23%                   20.01-25.00%        10%             20%                        >25.00%         5%              9%  Please note:  Romario et al did not make specific                recommendations regarding the use of                percent free PSA for any other population                of men.        Procedure: None  Procedures     I have reviewed and agree with the above PMH, PSH, FMH, social  history, medications, allergies, and labs.     Assessment/Plan:   Problem List Items Addressed This Visit       Frequency of micturition    Relevant Orders    POC Urinalysis Dipstick, Automated (Completed)    Urine Culture - Urine, Urine, Random Void    Colovesical fistula - Primary    Relevant Orders    Basic Metabolic Panel    FL Cystogram Minimum 3 View       Health Maintenance:   Health Maintenance Due   Topic Date Due    URINE MICROALBUMIN  Never done    Pneumococcal Vaccine 0-64 (1 of 2 - PCV) Never done    DIABETIC EYE EXAM  Never done    Hepatitis B (1 of 3 - 19+ 3-dose series) Never done    LIPID PANEL  11/23/2016    LUNG CANCER SCREENING  Never done    COVID-19 Vaccine (3 - 2023-24 season) 09/01/2023    HEPATITIS C SCREENING  Never done    ANNUAL PHYSICAL  Never done    DIABETIC FOOT EXAM  Never done    INFLUENZA VACCINE  08/01/2024        Smoking Counseling: Everyday smoker.  Former user of smokeless tobacco.  Counseling given.    Urine Incontinence: Patient reports that he is not currently experiencing any symptoms of urinary incontinence.    Patient was given instructions and counseling regarding his condition or for health maintenance advice. Please see specific information pulled into the AVS if appropriate.    Patient Education:   Colovesicular fistula -patient is roughly 3 weeks post bilateral double-J stent placement and colovesicular fistula repair by Dr. Hays and Dr. Salazar.  He has underwent a voiding trial and is urinating well voluntarily.  His urine does show concerns for infection and we will send off for culture and call with results once available.  Advised him to continue with Bactrim as prescribed by Dr. Hays.  Given recent repair we will schedule him for a fluoroscopy cystogram for concerns of any recurrent fistula and bladder opening.  I will check a BMP in office today and call with results once available.  We will see him back pending cystogram and schedule for a cystoscopy in  office for bilateral stent removal.    Visit Diagnoses:    ICD-10-CM ICD-9-CM   1. Colovesical fistula  N32.1 596.1   2. Frequency of micturition  R35.0 788.41     A total of 30 minutes were spent coordinating this patient’s care in clinic today; 18 minutes of which were face-to-face with the patient, reviewing medical history and counseling on the current treatment and followup plan.  All questions were answered to patient's satisfaction.    Meds Ordered During Visit:  No orders of the defined types were placed in this encounter.      Follow Up Appointment: Pending cystogram  No follow-ups on file.      This document has been electronically signed by Peterson Taylor PA-C   August 21, 2024 12:19 EDT    Part of this note may be an electronic transcription/translation of spoken language to printed text using the Dragon Dictation System.

## 2024-08-22 ENCOUNTER — TELEPHONE (OUTPATIENT)
Dept: UROLOGY | Facility: CLINIC | Age: 59
End: 2024-08-22
Payer: COMMERCIAL

## 2024-08-22 LAB
ANION GAP SERPL CALCULATED.3IONS-SCNC: 15 MMOL/L (ref 5–15)
BACTERIA SPEC AEROBE CULT: ABNORMAL
BUN SERPL-MCNC: 15 MG/DL (ref 6–20)
BUN/CREAT SERPL: 12.7 (ref 7–25)
CALCIUM SPEC-SCNC: 9.2 MG/DL (ref 8.6–10.5)
CHLORIDE SERPL-SCNC: 99 MMOL/L (ref 98–107)
CO2 SERPL-SCNC: 22 MMOL/L (ref 22–29)
CREAT SERPL-MCNC: 1.18 MG/DL (ref 0.76–1.27)
EGFRCR SERPLBLD CKD-EPI 2021: 72.4 ML/MIN/1.73
GLUCOSE SERPL-MCNC: 111 MG/DL (ref 65–99)
POTASSIUM SERPL-SCNC: 4.3 MMOL/L (ref 3.5–5.2)
SODIUM SERPL-SCNC: 136 MMOL/L (ref 136–145)

## 2024-08-22 NOTE — TELEPHONE ENCOUNTER
Called patient to go over his urine culture that was yeast, continue the diflucan and stop the bactrim per micha. Patient verbalized understanding.

## 2024-08-22 NOTE — TELEPHONE ENCOUNTER
----- Message from Peterson Taylor sent at 8/22/2024 12:50 PM EDT -----  Please let patient know urine culture showed yeast only.  If he would like me to I will send in a refill of Diflucan if he has finished this already.  He may stop the Bactrim.  Thank you.  ----- Message -----  From: Sasha Gutierrez MA  Sent: 8/21/2024  10:34 AM EDT  To: Peterson Taylor PA-C

## 2024-08-22 NOTE — TELEPHONE ENCOUNTER
----- Message from Peterson Taylor sent at 8/22/2024  8:18 AM EDT -----  Please let patient know kidney function remained stable.  We will call with urine culture results once finalized.  Thank you  ----- Message -----  From: Sasha Gutierrez MA  Sent: 8/21/2024  10:34 AM EDT  To: Peterson Taylor PA-C

## 2024-08-23 ENCOUNTER — READMISSION MANAGEMENT (OUTPATIENT)
Dept: CALL CENTER | Facility: HOSPITAL | Age: 59
End: 2024-08-23
Payer: COMMERCIAL

## 2024-08-23 NOTE — OUTREACH NOTE
General Surgery Week 2 Survey      Flowsheet Row Responses   [facility] patient discharged from? [facility]   Does the patient have one of the following disease processes/diagnoses(primary or secondary)? General Surgery   Week 2 attempt successful? Yes   Call start time 1248   Call end time 1255   Discharge diagnosis COLON RESECTION SIGMOID URETEROSCOPY URETERAL CATHETER/STENT INSERTION APPENDECTOMY COLOSTOMY LOOP   Meds reviewed with patient/caregiver? Yes   Is the patient having any side effects they believe may be caused by any medication additions or changes? No   Does the patient have all medications related to this admission filled (includes all antibiotics, pain medications, etc.) Yes   Is the patient taking all medications as directed (includes completed medication regime)? Yes   Does the patient have a follow up appointment scheduled with their surgeon? Yes   Has the patient kept scheduled appointments due by today? Yes   Home health comments Patient did not feel he needed home health   Psychosocial issues? No   Did the patient receive a copy of their discharge instructions? Yes   Nursing interventions Reviewed instructions with patient   What is the patient's perception of their health status since discharge? Improving   Nursing interventions Nurse provided patient education   Is the patient/caregiver able to teach back signs and symptoms of incisional infection? Increased redness, swelling or pain at the incisonal site, Pus or odor from incision, Fever, Increased drainage or bleeding, Incisional warmth   Is the patient/caregiver able to teach back steps to recovery at home? Set small, achievable goals for return to baseline health, Rest and rebuild strength, gradually increase activity, Eat a well-balance diet   If the patient is a current smoker, are they able to teach back resources for cessation? Not a smoker   Week 2 call completed? Yes   Is the patient interested in additional calls from an  ambulatory ? No   Would this patient benefit from a Referral to [facility] Social Work? No   Wrap up additional comments Patient reports he is having some rectal discharge and has reached out to office but has not received call back, encouraged patient to reach out to office again.   Call end time 0705            Ivins T - Registered Nurse

## 2024-08-26 ENCOUNTER — TELEPHONE (OUTPATIENT)
Dept: UROLOGY | Facility: CLINIC | Age: 59
End: 2024-08-26
Payer: COMMERCIAL

## 2024-08-26 ENCOUNTER — HOSPITAL ENCOUNTER (OUTPATIENT)
Dept: GENERAL RADIOLOGY | Facility: HOSPITAL | Age: 59
Discharge: HOME | End: 2024-08-26
Payer: COMMERCIAL

## 2024-08-26 DIAGNOSIS — N32.1 COLOVESICAL FISTULA: ICD-10-CM

## 2024-08-26 PROCEDURE — 0 DIATRIZOATE MEGLUMINE PER 1 ML

## 2024-08-26 PROCEDURE — 74430 CONTRAST X-RAY BLADDER: CPT

## 2024-08-26 RX ADMIN — DIATRIZOATE MEGLUMINE 200 ML: 300 INJECTION, SOLUTION INTRAVENOUS at 09:00

## 2024-08-26 NOTE — TELEPHONE ENCOUNTER
Called patient and advised him that cystogram showed no concerns of any leakage.  Will schedule him for a cystoscopy with stent extraction bilaterally this Wednesday.  He verbalized understanding.

## 2024-08-28 ENCOUNTER — PROCEDURE VISIT (OUTPATIENT)
Dept: UROLOGY | Facility: CLINIC | Age: 59
End: 2024-08-28
Payer: COMMERCIAL

## 2024-08-28 VITALS
HEART RATE: 82 BPM | WEIGHT: 247.8 LBS | HEIGHT: 70 IN | BODY MASS INDEX: 35.48 KG/M2 | DIASTOLIC BLOOD PRESSURE: 90 MMHG | SYSTOLIC BLOOD PRESSURE: 137 MMHG

## 2024-08-28 DIAGNOSIS — N32.1 COLOVESICAL FISTULA: Primary | ICD-10-CM

## 2024-08-28 RX ORDER — GENTAMICIN 40 MG/ML
80 INJECTION, SOLUTION INTRAMUSCULAR; INTRAVENOUS ONCE
Status: COMPLETED | OUTPATIENT
Start: 2024-08-28 | End: 2024-08-28

## 2024-08-28 RX ADMIN — GENTAMICIN 80 MG: 40 INJECTION, SOLUTION INTRAMUSCULAR; INTRAVENOUS at 11:43

## 2024-08-28 NOTE — H&P (VIEW-ONLY)
"Chief Complaint:    Chief Complaint   Patient presents with    BPH with obstruction/lower urinary tract symptoms     Cystoscopy Stent Removal        Vital Signs:   /90   Pulse 82   Ht 177.8 cm (70\")   Wt 112 kg (247 lb 12.8 oz)   BMI 35.56 kg/m²   Body mass index is 35.56 kg/m².      HPI:  Celestine Arriaga is a 58 y.o. male who presents today for follow up    History of Present Illness  Mr. Arriaga returns to the clinic today for follow-up.  He has a past medical history significant for colovesicular fistula for which he underwent repair by Dr. Hays in the OR.  He had double bilateral J stents placed by Dr. Salazar at that time.  He was discharged with indwelling Benton catheter and is underwent a voiding trial with good resolution.  He has had persistent yeast isolation on urine cultures.  I did complete a cystogram that revealed no evidence of fistula or leak.  He does still complain of yeast within the groin area.  He still has some frequency, urgency, and difficulty urinating at times.  He does wish to proceed forward with cystoscopy with possible bilateral J stent removal in office today.      Past Medical History:  Past Medical History:   Diagnosis Date    Arthritis     Chest pain     CHF (congestive heart failure)     COPD (chronic obstructive pulmonary disease)     Coronary artery disease     Diabetes mellitus     Elevated cholesterol     GERD (gastroesophageal reflux disease)     [place] (hard of hearing)     Hypertension     MI (myocardial infarction)     cardiac dr told him he had had a mild attack    Neuropathy     LOWER LEGS/FEET    Urinary tract infection        Current Meds:  Current Outpatient Medications   Medication Sig Dispense Refill    acetaminophen (TYLENOL) 500 MG tablet Take 1 tablet by mouth Every 6 (Six) Hours As Needed for Mild Pain.      albuterol sulfate  (90 Base) MCG/ACT inhaler Inhale 1 puff Every 6 (Six) Hours As Needed for Wheezing.      amLODIPine (NORVASC) 10 MG tablet " Take 1 tablet by mouth Daily.      aspirin 81 MG EC tablet Take 1 tablet by mouth Daily.      fluconazole (Diflucan) 100 MG tablet Take 1 tablet by mouth Daily for 10 days. 10 tablet 0    hydrALAZINE (APRESOLINE) 50 MG tablet Take 1 tablet by mouth 3 (Three) Times a Day.      metoprolol tartrate (LOPRESSOR) 25 MG tablet Take 1 tablet by mouth 2 (Two) Times a Day.      nitroglycerin (NITROSTAT) 0.4 MG SL tablet Place 1 tablet under the tongue Every 5 (Five) Minutes As Needed for Chest Pain. Take no more than 3 doses in 15 minutes.      oxyCODONE-acetaminophen (PERCOCET) 5-325 MG per tablet Take 1 tablet by mouth Every 6 (Six) Hours As Needed for Moderate Pain. 15 tablet 0    Ozempic, 0.25 or 0.5 MG/DOSE, 2 MG/3ML solution pen-injector INJECT 0.25 MG  SUBCUTANEOUSLY ONCE A WEEK      sertraline (ZOLOFT) 100 MG tablet Take 1 tablet by mouth Daily.       No current facility-administered medications for this visit.        Allergies:   Allergies   Allergen Reactions    Lisinopril Swelling        Past Surgical History:  Past Surgical History:   Procedure Laterality Date    APPENDECTOMY N/A 8/2/2024    Procedure: APPENDECTOMY;  Surgeon: Boogie Hays MD;  Location: [facility] OR;  Service: General;  Laterality: N/A;    BACK SURGERY      CARDIAC CATHETERIZATION      CARDIAC SURGERY      COLON RESECTION N/A 8/2/2024    Procedure: COLON RESECTION SIGMOID;  Surgeon: Boogie Hays MD;  Location: [facility] OR;  Service: General;  Laterality: N/A;    COLONOSCOPY      COLONOSCOPY N/A 07/18/2024    Procedure: COLONOSCOPY;  Surgeon: Boogie Hays MD;  Location: [facility] OR;  Service: Gastroenterology;  Laterality: N/A;    COLOSTOMY N/A 8/2/2024    Procedure: COLOSTOMY LOOP;  Surgeon: Boogie Hays MD;  Location: [facility] OR;  Service: General;  Laterality: N/A;    CORONARY ANGIOPLASTY WITH STENT PLACEMENT      3 STENTS TOTAL    CYSTOSCOPY W/ URETERAL STENT PLACEMENT Bilateral 8/2/2024    Procedure: URETEROSCOPY URETERAL  CATHETER/STENT INSERTION;  Surgeon: Boogie Hays MD;  Location: [facility];  Service: General;  Laterality: Bilateral;    EYE SURGERY Right     TOE AMPUTATION Left     great toe in  accident       Social History:  Social History     Socioeconomic History    Marital status:    Tobacco Use    Smoking status: Every Day     Current packs/day: 1.50     Average packs/day: 1.5 packs/day for 40.7 years (61.0 ttl pk-yrs)     Types: Cigarettes     Start date: 1984     Passive exposure: Current    Smokeless tobacco: Former     Types: Chew   Vaping Use    Vaping status: Never Used   Substance and Sexual Activity    Alcohol use: Not Currently     Alcohol/week: 2.0 standard drinks of alcohol     Types: 2 Shots of liquor per week     Comment: 2 mixed drinks a night    Drug use: Never    Sexual activity: Defer     Birth control/protection: None       Family History:  Family History   Problem Relation Age of Onset    No Known Problems Father     Cancer Mother        Review of Systems:  Review of Systems   Constitutional:  Positive for fatigue. Negative for chills, fever and unexpected weight change.   HENT:  Negative for congestion and sinus pressure.    Respiratory:  Negative for chest tightness and shortness of breath.    Cardiovascular:  Negative for chest pain.   Gastrointestinal:  Negative for abdominal pain, constipation, diarrhea, nausea and vomiting.   Genitourinary:  Positive for difficulty urinating, dysuria, frequency and urgency. Negative for flank pain, hematuria and testicular pain.   Musculoskeletal:  Positive for back pain. Negative for neck pain.   Skin:  Negative for rash.   Allergic/Immunologic: Negative for food allergies.   Neurological:  Negative for dizziness and headaches.   Hematological:  Bruises/bleeds easily.   Psychiatric/Behavioral:  Negative for confusion and suicidal ideas. The patient is nervous/anxious.        Physical Exam:  Physical Exam  Constitutional:       General: He is  not in acute distress.     Appearance: Normal appearance.   HENT:      Head: Normocephalic and atraumatic.      Nose: Nose normal.      Mouth/Throat:      Mouth: Mucous membranes are moist.   Eyes:      Conjunctiva/sclera: Conjunctivae normal.   Cardiovascular:      Rate and Rhythm: Normal rate and regular rhythm.      Pulses: Normal pulses.      Heart sounds: Normal heart sounds.   Pulmonary:      Effort: Pulmonary effort is normal.      Breath sounds: Normal breath sounds.   Abdominal:      General: Bowel sounds are normal.      Palpations: Abdomen is soft.      Comments: Colostomy present   Genitourinary:     Penis: Normal.       Testes: Normal.   Musculoskeletal:         General: Normal range of motion.      Cervical back: Normal range of motion.   Skin:     General: Skin is warm.   Neurological:      General: No focal deficit present.      Mental Status: He is alert and oriented to person, place, and time.   Psychiatric:         Mood and Affect: Mood normal.         Behavior: Behavior normal.         Thought Content: Thought content normal.         Judgment: Judgment normal.         Recent Image (CT and/or KUB):   CT Abdomen and Pelvis: No results found for this or any previous visit.     CT Stone Protocol: No results found for this or any previous visit.     KUB: No results found for this or any previous visit.       Labs:  Brief Urine Lab Results  (Last result in the past 365 days)        Color   Clarity   Blood   Leuk Est   Nitrite   Protein   CREAT   Urine HCG        08/21/24 1033 Dark Yellow   Cloudy   3+   Large (3+)   Negative   2+                 Office Visit on 08/21/2024   Component Date Value Ref Range Status    Color 08/21/2024 Dark Yellow  Yellow, Straw, Dark Yellow, Gayathri Final    Clarity, UA 08/21/2024 Cloudy (A)  Clear Final    Specific Gravity  08/21/2024 1.015  1.005 - 1.030 Final    pH, Urine 08/21/2024 6.5  5.0 - 8.0 Final    Leukocytes 08/21/2024 Large (3+) (A)  Negative Final    Nitrite,  UA 08/21/2024 Negative  Negative Final    Protein, POC 08/21/2024 2+ (A)  Negative mg/dL Final    Glucose, UA 08/21/2024 Negative  Negative mg/dL Final    Ketones, UA 08/21/2024 Negative  Negative Final    Urobilinogen, UA 08/21/2024 Normal  Normal, 0.2 E.U./dL Final    Bilirubin 08/21/2024 Negative  Negative Final    Blood, UA 08/21/2024 3+ (A)  Negative Final    Lot Number 08/21/2024 n   Final    Expiration Date 08/21/2024 n   Final    Urine Culture 08/21/2024 Yeast isolated (A)   Final    No further workup.    Glucose 08/21/2024 111 (H)  65 - 99 mg/dL Final    BUN 08/21/2024 15  6 - 20 mg/dL Final    Creatinine 08/21/2024 1.18  0.76 - 1.27 mg/dL Final    Sodium 08/21/2024 136  136 - 145 mmol/L Final    Potassium 08/21/2024 4.3  3.5 - 5.2 mmol/L Final    Chloride 08/21/2024 99  98 - 107 mmol/L Final    CO2 08/21/2024 22.0  22.0 - 29.0 mmol/L Final    Calcium 08/21/2024 9.2  8.6 - 10.5 mg/dL Final    BUN/Creatinine Ratio 08/21/2024 12.7  7.0 - 25.0 Final    Anion Gap 08/21/2024 15.0  5.0 - 15.0 mmol/L Final    eGFR 08/21/2024 72.4  >60.0 mL/min/1.73 Final   Lab on 08/15/2024   Component Date Value Ref Range Status    Color, UA 08/15/2024 Red (A)  Yellow, Straw Final    Any Substance that causes an abnormal urine color can alter the accuracy of the chemical reactions.    Appearance, UA 08/15/2024 Turbid (A)  Clear Final    pH, UA 08/15/2024 6.0  5.0 - 8.0 Final    Specific Gravity, UA 08/15/2024 1.020  1.005 - 1.030 Final    Glucose, UA 08/15/2024 Negative  Negative Final    Ketones, UA 08/15/2024 Negative  Negative Final    Bilirubin, UA 08/15/2024 Negative  Negative Final    Blood, UA 08/15/2024 Large (3+) (A)  Negative Final    Protein, UA 08/15/2024 >=300 mg/dL (3+) (A)  Negative Final    Leuk Esterase, UA 08/15/2024 Large (3+) (A)  Negative Final    Nitrite, UA 08/15/2024 Negative  Negative Final    Urobilinogen, UA 08/15/2024 0.2 E.U./dL  0.2 - 1.0 E.U./dL Final    Extra Tube 08/15/2024 Hold for add-ons.    Final    Auto resulted.    RBC, UA 08/15/2024 Too Numerous to Count (A)  None Seen, 0-2 /HPF Final    WBC, UA 08/15/2024 Too Numerous to Count (A)  None Seen, 0-2 /HPF Final    Bacteria, UA 08/15/2024 None Seen  None Seen /HPF Final    Squamous Epithelial Cells, UA 08/15/2024 0-2  None Seen, 0-2 /HPF Final    Hyaline Casts, UA 08/15/2024 3-6  None Seen /LPF Final    Methodology 08/15/2024 Automated Microscopy   Final    Urine Culture 08/15/2024 Yeast isolated (A)   Final    No further workup.     Admission on 2024, Discharged on 2024   Component Date Value Ref Range Status    Glucose 2024 138 (H)  70 - 130 mg/dL Final    Reference Lab Report 2024    Final                    Value:[facility]  [address] [place] [address]  Phone: [phone] or [phone]  Fax: [phone]  Vinicius Gong M.D., Medical Director    PATIENT NAME                           LABORATORY NO.  [id number]  TEDDY COOK                         [id number]  [id number]                         AGE              SEX  SSN           CLIENT REF #  [facility] BLAS              58      1965      [id number]   [id number]    [address]                     REQUESTING M.D.     ATTENDING [facility] [address]                   LUCY BAIRES  DATE COLLECTED      DATE RECEIVED      DATE REPORTED  2024    DIAGNOSIS:  A.   APPENDIX:  Fibrous obliteration of the appendiceal tip  Incidental periappendiceal benign cyst  No identified dysplasia or malignancy  B.   COLON, RESECTION, SIGMOID COLON:  Diverticulosis coli with associated intramural abscesses  Acute serositis and serosal fibrous                           adhesions  2 benign lymph nodes  Examined margins of resection are viable  No identified dysplasia or malignancy    CLINICAL HISTORY:  Colovesical fistula    SPECIMENS RECEIVED:  A.  APPENDIX  B.  COLON, RESECTION,  "SIGMOID COLON    MICROSCOPIC DESCRIPTION:  A.  Tissue blocks are prepared and slides are examined microscopically on all  specimens. See diagnosis for details.  B.  Tissue blocks are prepared and slides are examined microscopically on all  specimens. See diagnosis for details.    Professional interpretation rendered by Destiny Carpenter M.D. at [facility], [address] [place] [address].    GROSS DESCRIPTION:  A.  Received in formalin labeled \"appendix\" is a 6 cm long by 0.8 cm in  diameter intact appendix with a moderate amount of attached  mesoappendix.  The serosa is smooth and glistening and has a 0.3 cm tan  nodule located 0.7 cm from the proximal margin.  The proximal margin is  inked black and sectioning reveals a 0.4 cm in average diameter lumen  filled                           with soft, green fecal material.  The mucosa is tan and glistening and  the unremarkable tan appendiceal wall averages 0.2 cm thick.  No masses  or fecaliths are identified.  Representative sections to include the en face  proximal margin, half of the bisected tip and cross-section to include  serosal nodule are submitted in block A1.  B.  Received in formalin labeled \"sigmoid colon\" is a 13.5 cm long by 2.5 cm  in average diameter intact, unopened and tortuous segment of sigmoid  colon encased in mildly indurated pericolonic fat.  The serosa is gray-tan  and dusky with dense adhesions.  The lumen contains a minimal amount of  soft, brown fecal material and the mucosa is tan and glistening with normal  folds.  Sectioning reveals a diffusely thickened bowel wall averaging 1 cm  thick.  Located at least 3 cm from the mucosal margins is a red-brown  fistula tract.  Located approximately 2 cm from the fistula is a possible  perforation and corresponding abscess formation.  No polyps or                           invasive  lesions are identified.  There are no enlarged lymph nodes or mesenteric  masses identified.  Representative " sections are submitted as follows: B1:  Proximal mucosal margin en face; B2-B3: Distal mucosal margin, en face;  B4-B5: Fistula; B6: Possible perforation.  HDM    REVIEWED, DIAGNOSED AND ELECTRONICALLY  SIGNED BY:    Destiny Carpenter M.D.  CPT CODES:  06355, [id number]      Glucose 08/02/2024 165 (H)  70 - 130 mg/dL Final    WBC 08/03/2024 20.58 (H)  3.40 - 10.80 10*3/mm3 Final    RBC 08/03/2024 3.89 (L)  4.14 - 5.80 10*6/mm3 Final    Hemoglobin 08/03/2024 12.9 (L)  13.0 - 17.7 g/dL Final    Hematocrit 08/03/2024 38.9  37.5 - 51.0 % Final    MCV 08/03/2024 100.0 (H)  79.0 - 97.0 fL Final    MCH 08/03/2024 33.2 (H)  26.6 - 33.0 pg Final    MCHC 08/03/2024 33.2  31.5 - 35.7 g/dL Final    RDW 08/03/2024 15.4  12.3 - 15.4 % Final    RDW-SD 08/03/2024 57.1 (H)  37.0 - 54.0 fl Final    MPV 08/03/2024 10.2  6.0 - 12.0 fL Final    Platelets 08/03/2024 267  140 - 450 10*3/mm3 Final    Glucose 08/03/2024 132 (H)  65 - 99 mg/dL Final    BUN 08/03/2024 18  6 - 20 mg/dL Final    Creatinine 08/03/2024 1.26  0.76 - 1.27 mg/dL Final    Sodium 08/03/2024 139  136 - 145 mmol/L Final    Potassium 08/03/2024 4.5  3.5 - 5.2 mmol/L Final    Chloride 08/03/2024 104  98 - 107 mmol/L Final    CO2 08/03/2024 22.1  22.0 - 29.0 mmol/L Final    Calcium 08/03/2024 8.6  8.6 - 10.5 mg/dL Final    BUN/Creatinine Ratio 08/03/2024 14.3  7.0 - 25.0 Final    Anion Gap 08/03/2024 12.9  5.0 - 15.0 mmol/L Final    eGFR 08/03/2024 66.9  >60.0 mL/min/1.73 Final    C-Reactive Protein 08/03/2024 7.03 (H)  0.00 - 0.50 mg/dL Final    WBC 08/04/2024 16.24 (H)  3.40 - 10.80 10*3/mm3 Final    RBC 08/04/2024 3.70 (L)  4.14 - 5.80 10*6/mm3 Final    Hemoglobin 08/04/2024 12.0 (L)  13.0 - 17.7 g/dL Final    Hematocrit 08/04/2024 37.2 (L)  37.5 - 51.0 % Final    MCV 08/04/2024 100.5 (H)  79.0 - 97.0 fL Final    MCH 08/04/2024 32.4  26.6 - 33.0 pg Final    MCHC 08/04/2024 32.3  31.5 - 35.7 g/dL Final    RDW 08/04/2024 15.3  12.3 - 15.4 % Final    RDW-SD 08/04/2024 56.3 (H)   37.0 - 54.0 fl Final    MPV 08/04/2024 10.2  6.0 - 12.0 fL Final    Platelets 08/04/2024 240  140 - 450 10*3/mm3 Final    Glucose 08/04/2024 112 (H)  65 - 99 mg/dL Final    BUN 08/04/2024 16  6 - 20 mg/dL Final    Creatinine 08/04/2024 1.10  0.76 - 1.27 mg/dL Final    Sodium 08/04/2024 135 (L)  136 - 145 mmol/L Final    Potassium 08/04/2024 3.9  3.5 - 5.2 mmol/L Final    Slight hemolysis detected by analyzer. Result may be falsely elevated.    Chloride 08/04/2024 102  98 - 107 mmol/L Final    CO2 08/04/2024 21.2 (L)  22.0 - 29.0 mmol/L Final    Calcium 08/04/2024 8.4 (L)  8.6 - 10.5 mg/dL Final    BUN/Creatinine Ratio 08/04/2024 14.5  7.0 - 25.0 Final    Anion Gap 08/04/2024 11.8  5.0 - 15.0 mmol/L Final    eGFR 08/04/2024 78.8  >60.0 mL/min/1.73 Final    Magnesium 08/04/2024 1.8  1.6 - 2.6 mg/dL Final    Phosphorus 08/04/2024 3.0  2.5 - 4.5 mg/dL Final    WBC 08/05/2024 17.32 (H)  3.40 - 10.80 10*3/mm3 Final    RBC 08/05/2024 3.55 (L)  4.14 - 5.80 10*6/mm3 Final    Hemoglobin 08/05/2024 11.6 (L)  13.0 - 17.7 g/dL Final    Hematocrit 08/05/2024 36.0 (L)  37.5 - 51.0 % Final    MCV 08/05/2024 101.4 (H)  79.0 - 97.0 fL Final    MCH 08/05/2024 32.7  26.6 - 33.0 pg Final    MCHC 08/05/2024 32.2  31.5 - 35.7 g/dL Final    RDW 08/05/2024 15.1  12.3 - 15.4 % Final    RDW-SD 08/05/2024 57.0 (H)  37.0 - 54.0 fl Final    MPV 08/05/2024 10.0  6.0 - 12.0 fL Final    Platelets 08/05/2024 256  140 - 450 10*3/mm3 Final    Glucose 08/05/2024 120 (H)  65 - 99 mg/dL Final    BUN 08/05/2024 10  6 - 20 mg/dL Final    Creatinine 08/05/2024 0.82  0.76 - 1.27 mg/dL Final    Sodium 08/05/2024 137  136 - 145 mmol/L Final    Potassium 08/05/2024 4.0  3.5 - 5.2 mmol/L Final    Chloride 08/05/2024 107  98 - 107 mmol/L Final    CO2 08/05/2024 20.6 (L)  22.0 - 29.0 mmol/L Final    Calcium 08/05/2024 9.2  8.6 - 10.5 mg/dL Final    BUN/Creatinine Ratio 08/05/2024 12.2  7.0 - 25.0 Final    Anion Gap 08/05/2024 9.4  5.0 - 15.0 mmol/L Final    eGFR  08/05/2024 103.1  >60.0 mL/min/1.73 Final    C-Reactive Protein 08/05/2024 26.37 (H)  0.00 - 0.50 mg/dL Final    WBC 08/06/2024 15.60 (H)  3.40 - 10.80 10*3/mm3 Final    RBC 08/06/2024 3.89 (L)  4.14 - 5.80 10*6/mm3 Final    Hemoglobin 08/06/2024 12.5 (L)  13.0 - 17.7 g/dL Final    Hematocrit 08/06/2024 38.5  37.5 - 51.0 % Final    MCV 08/06/2024 99.0 (H)  79.0 - 97.0 fL Final    MCH 08/06/2024 32.1  26.6 - 33.0 pg Final    MCHC 08/06/2024 32.5  31.5 - 35.7 g/dL Final    RDW 08/06/2024 15.2  12.3 - 15.4 % Final    RDW-SD 08/06/2024 54.7 (H)  37.0 - 54.0 fl Final    MPV 08/06/2024 10.3  6.0 - 12.0 fL Final    Platelets 08/06/2024 297  140 - 450 10*3/mm3 Final    C-Reactive Protein 08/06/2024 19.34 (H)  0.00 - 0.50 mg/dL Final    WBC 08/07/2024 17.34 (H)  3.40 - 10.80 10*3/mm3 Final    RBC 08/07/2024 3.96 (L)  4.14 - 5.80 10*6/mm3 Final    Hemoglobin 08/07/2024 12.8 (L)  13.0 - 17.7 g/dL Final    Hematocrit 08/07/2024 40.4  37.5 - 51.0 % Final    MCV 08/07/2024 102.0 (H)  79.0 - 97.0 fL Final    MCH 08/07/2024 32.3  26.6 - 33.0 pg Final    MCHC 08/07/2024 31.7  31.5 - 35.7 g/dL Final    RDW 08/07/2024 15.1  12.3 - 15.4 % Final    RDW-SD 08/07/2024 56.9 (H)  37.0 - 54.0 fl Final    MPV 08/07/2024 9.9  6.0 - 12.0 fL Final    Platelets 08/07/2024 320  140 - 450 10*3/mm3 Final    C-Reactive Protein 08/07/2024 13.52 (H)  0.00 - 0.50 mg/dL Final    Procalcitonin 08/07/2024 0.16  0.00 - 0.25 ng/mL Final    proBNP 08/07/2024 332.6  0.0 - 900.0 pg/mL Final    Site 08/07/2024 Right Radial   Final    Jesús's Test 08/07/2024 Positive   Final    pH, Arterial 08/07/2024 7.441  7.350 - 7.450 pH units Final    pCO2, Arterial 08/07/2024 28.9 (L)  35.0 - 45.0 mm Hg Final    84 Value below reference range    pO2, Arterial 08/07/2024 63.0 (L)  83.0 - 108.0 mm Hg Final    84 Value below reference range    HCO3, Arterial 08/07/2024 19.7 (L)  20.0 - 26.0 mmol/L Final    84 Value below reference range    Base Excess, Arterial 08/07/2024 -3.3  (L)  0.0 - 2.0 mmol/L Final    O2 Saturation, Arterial 08/07/2024 92.7 (L)  94.0 - 99.0 % Final    84 Value below reference range    Hemoglobin, Blood Gas 08/07/2024 12.9 (L)  14 - 18 g/dL Final    84 Value below reference range    Hematocrit, Blood Gas 08/07/2024 39.6  38.0 - 51.0 % Final    Oxyhemoglobin 08/07/2024 91.3 (L)  94 - 99 % Final    84 Value below reference range    Methemoglobin 08/07/2024 0.20  0.00 - 3.00 % Final    Carboxyhemoglobin 08/07/2024 1.3  0 - 5 % Final    A-a DO2 08/07/2024 148.6  0.0 - 300.0 mmHg Final    CO2 Content 08/07/2024 20.6 (L)  22 - 33 mmol/L Final    Barometric Pressure for Blood Gas 08/07/2024 723  mmHg Final    Modality 08/07/2024 Nasal Cannula   Final    FIO2 08/07/2024 36  % Final    Flow Rate 08/07/2024 4.0  lpm Final    Ventilator Mode 08/07/2024 NA   Final    Collected by 08/07/2024 [id number]   Final    Meter: [id number]     :  [id number]    Respiratory Culture 08/07/2024 Moderate growth (3+) Normal respiratory daryn. No S. aureus or Pseudomonas aeruginosa detected. Final report.   Final    Gram Stain 08/07/2024 Few (2+) Epithelial cells seen   Final    Gram Stain 08/07/2024 Moderate (3+) WBCs seen   Final    Gram Stain 08/07/2024 Many (4+) Mixed bacterial morphotypes seen on Gram Stain   Final    C-Reactive Protein 08/08/2024 10.00 (H)  0.00 - 0.50 mg/dL Final    Glucose 08/08/2024 175 (H)  65 - 99 mg/dL Final    BUN 08/08/2024 16  6 - 20 mg/dL Final    Creatinine 08/08/2024 0.85  0.76 - 1.27 mg/dL Final    Sodium 08/08/2024 136  136 - 145 mmol/L Final    Potassium 08/08/2024 4.3  3.5 - 5.2 mmol/L Final    Chloride 08/08/2024 106  98 - 107 mmol/L Final    CO2 08/08/2024 17.1 (L)  22.0 - 29.0 mmol/L Final    Calcium 08/08/2024 9.2  8.6 - 10.5 mg/dL Final    BUN/Creatinine Ratio 08/08/2024 18.8  7.0 - 25.0 Final    Anion Gap 08/08/2024 12.9  5.0 - 15.0 mmol/L Final    eGFR 08/08/2024 102.0  >60.0 mL/min/1.73 Final    WBC 08/08/2024 15.36 (H)  3.40 - 10.80  10*3/mm3 Final    RBC 08/08/2024 3.96 (L)  4.14 - 5.80 10*6/mm3 Final    Hemoglobin 08/08/2024 13.1  13.0 - 17.7 g/dL Final    Hematocrit 08/08/2024 39.5  37.5 - 51.0 % Final    MCV 08/08/2024 99.7 (H)  79.0 - 97.0 fL Final    MCH 08/08/2024 33.1 (H)  26.6 - 33.0 pg Final    MCHC 08/08/2024 33.2  31.5 - 35.7 g/dL Final    RDW 08/08/2024 14.8  12.3 - 15.4 % Final    RDW-SD 08/08/2024 54.9 (H)  37.0 - 54.0 fl Final    MPV 08/08/2024 10.7  6.0 - 12.0 fL Final    Platelets 08/08/2024 393  140 - 450 10*3/mm3 Final    Neutrophil % 08/08/2024 86.9 (H)  42.7 - 76.0 % Final    Lymphocyte % 08/08/2024 9.0 (L)  19.6 - 45.3 % Final    Monocyte % 08/08/2024 2.7 (L)  5.0 - 12.0 % Final    Eosinophil % 08/08/2024 0.1 (L)  0.3 - 6.2 % Final    Basophil % 08/08/2024 0.3  0.0 - 1.5 % Final    Immature Grans % 08/08/2024 1.0 (H)  0.0 - 0.5 % Final    Neutrophils, Absolute 08/08/2024 13.33 (H)  1.70 - 7.00 10*3/mm3 Final    Lymphocytes, Absolute 08/08/2024 1.39  0.70 - 3.10 10*3/mm3 Final    Monocytes, Absolute 08/08/2024 0.42  0.10 - 0.90 10*3/mm3 Final    Eosinophils, Absolute 08/08/2024 0.01  0.00 - 0.40 10*3/mm3 Final    Basophils, Absolute 08/08/2024 0.05  0.00 - 0.20 10*3/mm3 Final    Immature Grans, Absolute 08/08/2024 0.16 (H)  0.00 - 0.05 10*3/mm3 Final    nRBC 08/08/2024 0.0  0.0 - 0.2 /100 WBC Final    WBC 08/09/2024 23.38 (H)  3.40 - 10.80 10*3/mm3 Final    RBC 08/09/2024 3.96 (L)  4.14 - 5.80 10*6/mm3 Final    Hemoglobin 08/09/2024 12.9 (L)  13.0 - 17.7 g/dL Final    Hematocrit 08/09/2024 39.6  37.5 - 51.0 % Final    MCV 08/09/2024 100.0 (H)  79.0 - 97.0 fL Final    MCH 08/09/2024 32.6  26.6 - 33.0 pg Final    MCHC 08/09/2024 32.6  31.5 - 35.7 g/dL Final    RDW 08/09/2024 15.1  12.3 - 15.4 % Final    RDW-SD 08/09/2024 56.1 (H)  37.0 - 54.0 fl Final    MPV 08/09/2024 10.8  6.0 - 12.0 fL Final    Platelets 08/09/2024 449  140 - 450 10*3/mm3 Final    Neutrophil % 08/09/2024 79.7 (H)  42.7 - 76.0 % Final    Lymphocyte %  08/09/2024 10.9 (L)  19.6 - 45.3 % Final    Monocyte % 08/09/2024 7.0  5.0 - 12.0 % Final    Eosinophil % 08/09/2024 0.0 (L)  0.3 - 6.2 % Final    Basophil % 08/09/2024 0.3  0.0 - 1.5 % Final    Immature Grans % 08/09/2024 2.1 (H)  0.0 - 0.5 % Final    Neutrophils, Absolute 08/09/2024 18.62 (H)  1.70 - 7.00 10*3/mm3 Final    Lymphocytes, Absolute 08/09/2024 2.55  0.70 - 3.10 10*3/mm3 Final    Monocytes, Absolute 08/09/2024 1.64 (H)  0.10 - 0.90 10*3/mm3 Final    Eosinophils, Absolute 08/09/2024 0.00  0.00 - 0.40 10*3/mm3 Final    Basophils, Absolute 08/09/2024 0.07  0.00 - 0.20 10*3/mm3 Final    Immature Grans, Absolute 08/09/2024 0.50 (H)  0.00 - 0.05 10*3/mm3 Final    nRBC 08/09/2024 0.0  0.0 - 0.2 /100 WBC Final   Pre-Admission Testing on 07/31/2024   Component Date Value Ref Range Status    Glucose 07/31/2024 113 (H)  65 - 99 mg/dL Final    BUN 07/31/2024 11  6 - 20 mg/dL Final    Creatinine 07/31/2024 0.83  0.76 - 1.27 mg/dL Final    Sodium 07/31/2024 142  136 - 145 mmol/L Final    Potassium 07/31/2024 3.9  3.5 - 5.2 mmol/L Final    Chloride 07/31/2024 104  98 - 107 mmol/L Final    CO2 07/31/2024 20.9 (L)  22.0 - 29.0 mmol/L Final    Calcium 07/31/2024 9.8  8.6 - 10.5 mg/dL Final    BUN/Creatinine Ratio 07/31/2024 13.3  7.0 - 25.0 Final    Anion Gap 07/31/2024 17.1 (H)  5.0 - 15.0 mmol/L Final    eGFR 07/31/2024 102.7  >60.0 mL/min/1.73 Final    WBC 07/31/2024 10.97 (H)  3.40 - 10.80 10*3/mm3 Final    RBC 07/31/2024 4.85  4.14 - 5.80 10*6/mm3 Final    Hemoglobin 07/31/2024 15.7  13.0 - 17.7 g/dL Final    Hematocrit 07/31/2024 47.2  37.5 - 51.0 % Final    MCV 07/31/2024 97.3 (H)  79.0 - 97.0 fL Final    MCH 07/31/2024 32.4  26.6 - 33.0 pg Final    MCHC 07/31/2024 33.3  31.5 - 35.7 g/dL Final    RDW 07/31/2024 15.3  12.3 - 15.4 % Final    RDW-SD 07/31/2024 55.2 (H)  37.0 - 54.0 fl Final    MPV 07/31/2024 10.1  6.0 - 12.0 fL Final    Platelets 07/31/2024 304  140 - 450 10*3/mm3 Final    QT Interval 07/31/2024 402   ms Final    QTC Interval 07/31/2024 448  ms Final   Admission on 07/18/2024, Discharged on 07/18/2024   Component Date Value Ref Range Status    Glucose 07/18/2024 138 (H)  70 - 130 mg/dL Final   Office Visit on 06/11/2024   Component Date Value Ref Range Status    Color 06/11/2024 Gayathri  Yellow, Straw, Dark Yellow, Gayathri Final    Clarity, UA 06/11/2024 Clear  Clear Final    Specific Gravity  06/11/2024 1.010  1.005 - 1.030 Final    pH, Urine 06/11/2024 7.5  5.0 - 8.0 Final    Leukocytes 06/11/2024 Moderate (2+) (A)  Negative Final    Nitrite, UA 06/11/2024 Negative  Negative Final    Protein, POC 06/11/2024 1+ (A)  Negative mg/dL Final    Glucose, UA 06/11/2024 Negative  Negative mg/dL Final    Ketones, UA 06/11/2024 Trace (A)  Negative Final    Urobilinogen, UA 06/11/2024 Normal  Normal, 0.2 E.U./dL Final    Bilirubin 06/11/2024 Small (1+) (A)  Negative Final    Blood, UA 06/11/2024 Trace (A)  Negative Final    Lot Number 06/11/2024 [id number]   Final    Expiration Date 06/11/2024 10/25/24   Final    Urine Culture 06/11/2024 No growth   Final    PSA 06/11/2024 0.8  0.0 - 4.0 ng/mL Final    Roche ECLIA methodology.  According to the American Urological Association, Serum PSA should  decrease and remain at undetectable levels after radical  prostatectomy. The AUA defines biochemical recurrence as an initial  PSA value 0.2 ng/mL or greater followed by a subsequent confirmatory  PSA value 0.2 ng/mL or greater.  Values obtained with different assay methods or kits cannot be used  interchangeably. Results cannot be interpreted as absolute evidence  of the presence or absence of malignant disease.    PSA, Free 06/11/2024 0.06  N/A ng/mL Final    Roche ECLIA methodology.    PSA, Free % 06/11/2024 7.5  % Final    The table below lists the probability of prostate cancer for  men with non-suspicious [place] results and total PSA between  4 and 10 ng/mL, by patient age (Romario et al, MADDIE 1998,  279:1542).                     % Free PSA       50-64 yr        65-75 yr                    0.00-10.00%        56%             55%                   10.01-15.00%        24%             35%                   15.01-20.00%        17%             23%                   20.01-25.00%        10%             20%                        >25.00%         5%              9%  Please note:  Romario et al did not make specific                recommendations regarding the use of                percent free PSA for any other population                of men.        Procedure:Patient presents today for cystourethroscopy.  I went ahead and obtained an informed consent including the risks of anesthesia, bleeding, infection, etc.  After prepping and draping in a sterile fashion in the low dorsal lithotomy position, the urethra was gently anesthetized with 10 mL of 2% viscous Xylocaine jelly.  After an appropriate period of topical anesthesia, I used the Olympus digital 14 [place] flexible cystoscope to examine the anterior urethra which showed significant irritation and edema.  Prostate was enlarged with a noticeable median bar.  Upon entrance of the bladder I was unable to see anything due to significant cystitis and cloudy urine.  We were able to barely visualize his bilateral double J stent's and was unsuccessful in removal in office.  Will get him scheduled in the OR.  The patient was given 80 mg of gentamicin in an intramuscular fashion as prophylaxis for the cystoscopy and released from the clinic.   Procedures     Assessment/Plan:   Problem List Items Addressed This Visit       Frequency of micturition - Primary    Relevant Medications    gentamicin (GARAMYCIN) injection 80 mg (Completed)       Health Maintenance:   Health Maintenance Due   Topic Date Due    URINE MICROALBUMIN  Never done    Pneumococcal Vaccine 0-64 (1 of 2 - PCV) Never done    DIABETIC EYE EXAM  Never done    Hepatitis B (1 of 3 - 19+ 3-dose series) Never done    LIPID PANEL  11/23/2016     LUNG CANCER SCREENING  Never done    COVID-19 Vaccine (3 - 2023-24 season) 09/01/2023    HEPATITIS C SCREENING  Never done    ANNUAL PHYSICAL  Never done    DIABETIC FOOT EXAM  Never done    INFLUENZA VACCINE  08/01/2024        Smoking Counseling: Everyday smoker.  Former user of smokeless tobacco.  Counseling given.    Urine Incontinence: Patient reports that he is not currently experiencing any symptoms of urinary incontinence.    Patient was given instructions and counseling regarding his condition or for health maintenance advice. Please see specific information pulled into the AVS if appropriate.    Patient Education:   Colovesicular fistula -patient had recent cystogram that showed no evidence of fistula or leakage.  I did attempt a cystoscopy in office however was unsuccessful due to patient's significant cloudy urine and notable cystitis.  I was unable to visualize the stents well enough to remove.  Patient was unable to tolerate cystoscopy in office as well and was very uncomfortable.  We will give him a shot of gentamicin 80 mg in office today for UTI prophylaxis and send the patient's urine off for culture.  Will get him scheduled for a cystoscopy with double-J stent removal this Monday in the OR.    Visit Diagnoses:    ICD-10-CM ICD-9-CM   1. 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      This document has been electronically signed by Peterson Taylor PA-C   August 28, 2024 12:14 EDT    Part of this note may be an electronic transcription/translation of spoken language to printed text using the Dragon Dictation System.

## 2024-08-28 NOTE — PROGRESS NOTES
"Chief Complaint:    Chief Complaint   Patient presents with    BPH with obstruction/lower urinary tract symptoms     Cystoscopy Stent Removal        Vital Signs:   /90   Pulse 82   Ht 177.8 cm (70\")   Wt 112 kg (247 lb 12.8 oz)   BMI 35.56 kg/m²   Body mass index is 35.56 kg/m².      HPI:  Celestine Arriaga is a 58 y.o. male who presents today for follow up    History of Present Illness  Mr. Arriaga returns to the clinic today for follow-up.  He has a past medical history significant for colovesicular fistula for which he underwent repair by Dr. Hays in the OR.  He had double bilateral J stents placed by Dr. Salazar at that time.  He was discharged with indwelling Benton catheter and is underwent a voiding trial with good resolution.  He has had persistent yeast isolation on urine cultures.  I did complete a cystogram that revealed no evidence of fistula or leak.  He does still complain of yeast within the groin area.  He still has some frequency, urgency, and difficulty urinating at times.  He does wish to proceed forward with cystoscopy with possible bilateral J stent removal in office today.      Past Medical History:  Past Medical History:   Diagnosis Date    Arthritis     Chest pain     CHF (congestive heart failure)     COPD (chronic obstructive pulmonary disease)     Coronary artery disease     Diabetes mellitus     Elevated cholesterol     GERD (gastroesophageal reflux disease)     [place] (hard of hearing)     Hypertension     MI (myocardial infarction)     cardiac dr told him he had had a mild attack    Neuropathy     LOWER LEGS/FEET    Urinary tract infection        Current Meds:  Current Outpatient Medications   Medication Sig Dispense Refill    acetaminophen (TYLENOL) 500 MG tablet Take 1 tablet by mouth Every 6 (Six) Hours As Needed for Mild Pain.      albuterol sulfate  (90 Base) MCG/ACT inhaler Inhale 1 puff Every 6 (Six) Hours As Needed for Wheezing.      amLODIPine (NORVASC) 10 MG tablet " Take 1 tablet by mouth Daily.      aspirin 81 MG EC tablet Take 1 tablet by mouth Daily.      fluconazole (Diflucan) 100 MG tablet Take 1 tablet by mouth Daily for 10 days. 10 tablet 0    hydrALAZINE (APRESOLINE) 50 MG tablet Take 1 tablet by mouth 3 (Three) Times a Day.      metoprolol tartrate (LOPRESSOR) 25 MG tablet Take 1 tablet by mouth 2 (Two) Times a Day.      nitroglycerin (NITROSTAT) 0.4 MG SL tablet Place 1 tablet under the tongue Every 5 (Five) Minutes As Needed for Chest Pain. Take no more than 3 doses in 15 minutes.      oxyCODONE-acetaminophen (PERCOCET) 5-325 MG per tablet Take 1 tablet by mouth Every 6 (Six) Hours As Needed for Moderate Pain. 15 tablet 0    Ozempic, 0.25 or 0.5 MG/DOSE, 2 MG/3ML solution pen-injector INJECT 0.25 MG  SUBCUTANEOUSLY ONCE A WEEK      sertraline (ZOLOFT) 100 MG tablet Take 1 tablet by mouth Daily.       No current facility-administered medications for this visit.        Allergies:   Allergies   Allergen Reactions    Lisinopril Swelling        Past Surgical History:  Past Surgical History:   Procedure Laterality Date    APPENDECTOMY N/A 8/2/2024    Procedure: APPENDECTOMY;  Surgeon: Boogie Hays MD;  Location: [facility] OR;  Service: General;  Laterality: N/A;    BACK SURGERY      CARDIAC CATHETERIZATION      CARDIAC SURGERY      COLON RESECTION N/A 8/2/2024    Procedure: COLON RESECTION SIGMOID;  Surgeon: Boogie Hays MD;  Location: [facility] OR;  Service: General;  Laterality: N/A;    COLONOSCOPY      COLONOSCOPY N/A 07/18/2024    Procedure: COLONOSCOPY;  Surgeon: Boogie Hays MD;  Location: [facility] OR;  Service: Gastroenterology;  Laterality: N/A;    COLOSTOMY N/A 8/2/2024    Procedure: COLOSTOMY LOOP;  Surgeon: Boogie Hays MD;  Location: [facility] OR;  Service: General;  Laterality: N/A;    CORONARY ANGIOPLASTY WITH STENT PLACEMENT      3 STENTS TOTAL    CYSTOSCOPY W/ URETERAL STENT PLACEMENT Bilateral 8/2/2024    Procedure: URETEROSCOPY URETERAL  CATHETER/STENT INSERTION;  Surgeon: Boogie Hays MD;  Location: [facility];  Service: General;  Laterality: Bilateral;    EYE SURGERY Right     TOE AMPUTATION Left     great toe in  accident       Social History:  Social History     Socioeconomic History    Marital status:    Tobacco Use    Smoking status: Every Day     Current packs/day: 1.50     Average packs/day: 1.5 packs/day for 40.7 years (61.0 ttl pk-yrs)     Types: Cigarettes     Start date: 1984     Passive exposure: Current    Smokeless tobacco: Former     Types: Chew   Vaping Use    Vaping status: Never Used   Substance and Sexual Activity    Alcohol use: Not Currently     Alcohol/week: 2.0 standard drinks of alcohol     Types: 2 Shots of liquor per week     Comment: 2 mixed drinks a night    Drug use: Never    Sexual activity: Defer     Birth control/protection: None       Family History:  Family History   Problem Relation Age of Onset    No Known Problems Father     Cancer Mother        Review of Systems:  Review of Systems   Constitutional:  Positive for fatigue. Negative for chills, fever and unexpected weight change.   HENT:  Negative for congestion and sinus pressure.    Respiratory:  Negative for chest tightness and shortness of breath.    Cardiovascular:  Negative for chest pain.   Gastrointestinal:  Negative for abdominal pain, constipation, diarrhea, nausea and vomiting.   Genitourinary:  Positive for difficulty urinating, dysuria, frequency and urgency. Negative for flank pain, hematuria and testicular pain.   Musculoskeletal:  Positive for back pain. Negative for neck pain.   Skin:  Negative for rash.   Allergic/Immunologic: Negative for food allergies.   Neurological:  Negative for dizziness and headaches.   Hematological:  Bruises/bleeds easily.   Psychiatric/Behavioral:  Negative for confusion and suicidal ideas. The patient is nervous/anxious.        Physical Exam:  Physical Exam  Constitutional:       General: He is  not in acute distress.     Appearance: Normal appearance.   HENT:      Head: Normocephalic and atraumatic.      Nose: Nose normal.      Mouth/Throat:      Mouth: Mucous membranes are moist.   Eyes:      Conjunctiva/sclera: Conjunctivae normal.   Cardiovascular:      Rate and Rhythm: Normal rate and regular rhythm.      Pulses: Normal pulses.      Heart sounds: Normal heart sounds.   Pulmonary:      Effort: Pulmonary effort is normal.      Breath sounds: Normal breath sounds.   Abdominal:      General: Bowel sounds are normal.      Palpations: Abdomen is soft.      Comments: Colostomy present   Genitourinary:     Penis: Normal.       Testes: Normal.   Musculoskeletal:         General: Normal range of motion.      Cervical back: Normal range of motion.   Skin:     General: Skin is warm.   Neurological:      General: No focal deficit present.      Mental Status: He is alert and oriented to person, place, and time.   Psychiatric:         Mood and Affect: Mood normal.         Behavior: Behavior normal.         Thought Content: Thought content normal.         Judgment: Judgment normal.         Recent Image (CT and/or KUB):   CT Abdomen and Pelvis: No results found for this or any previous visit.     CT Stone Protocol: No results found for this or any previous visit.     KUB: No results found for this or any previous visit.       Labs:  Brief Urine Lab Results  (Last result in the past 365 days)        Color   Clarity   Blood   Leuk Est   Nitrite   Protein   CREAT   Urine HCG        08/21/24 1033 Dark Yellow   Cloudy   3+   Large (3+)   Negative   2+                 Office Visit on 08/21/2024   Component Date Value Ref Range Status    Color 08/21/2024 Dark Yellow  Yellow, Straw, Dark Yellow, Gayathri Final    Clarity, UA 08/21/2024 Cloudy (A)  Clear Final    Specific Gravity  08/21/2024 1.015  1.005 - 1.030 Final    pH, Urine 08/21/2024 6.5  5.0 - 8.0 Final    Leukocytes 08/21/2024 Large (3+) (A)  Negative Final    Nitrite,  UA 08/21/2024 Negative  Negative Final    Protein, POC 08/21/2024 2+ (A)  Negative mg/dL Final    Glucose, UA 08/21/2024 Negative  Negative mg/dL Final    Ketones, UA 08/21/2024 Negative  Negative Final    Urobilinogen, UA 08/21/2024 Normal  Normal, 0.2 E.U./dL Final    Bilirubin 08/21/2024 Negative  Negative Final    Blood, UA 08/21/2024 3+ (A)  Negative Final    Lot Number 08/21/2024 n   Final    Expiration Date 08/21/2024 n   Final    Urine Culture 08/21/2024 Yeast isolated (A)   Final    No further workup.    Glucose 08/21/2024 111 (H)  65 - 99 mg/dL Final    BUN 08/21/2024 15  6 - 20 mg/dL Final    Creatinine 08/21/2024 1.18  0.76 - 1.27 mg/dL Final    Sodium 08/21/2024 136  136 - 145 mmol/L Final    Potassium 08/21/2024 4.3  3.5 - 5.2 mmol/L Final    Chloride 08/21/2024 99  98 - 107 mmol/L Final    CO2 08/21/2024 22.0  22.0 - 29.0 mmol/L Final    Calcium 08/21/2024 9.2  8.6 - 10.5 mg/dL Final    BUN/Creatinine Ratio 08/21/2024 12.7  7.0 - 25.0 Final    Anion Gap 08/21/2024 15.0  5.0 - 15.0 mmol/L Final    eGFR 08/21/2024 72.4  >60.0 mL/min/1.73 Final   Lab on 08/15/2024   Component Date Value Ref Range Status    Color, UA 08/15/2024 Red (A)  Yellow, Straw Final    Any Substance that causes an abnormal urine color can alter the accuracy of the chemical reactions.    Appearance, UA 08/15/2024 Turbid (A)  Clear Final    pH, UA 08/15/2024 6.0  5.0 - 8.0 Final    Specific Gravity, UA 08/15/2024 1.020  1.005 - 1.030 Final    Glucose, UA 08/15/2024 Negative  Negative Final    Ketones, UA 08/15/2024 Negative  Negative Final    Bilirubin, UA 08/15/2024 Negative  Negative Final    Blood, UA 08/15/2024 Large (3+) (A)  Negative Final    Protein, UA 08/15/2024 >=300 mg/dL (3+) (A)  Negative Final    Leuk Esterase, UA 08/15/2024 Large (3+) (A)  Negative Final    Nitrite, UA 08/15/2024 Negative  Negative Final    Urobilinogen, UA 08/15/2024 0.2 E.U./dL  0.2 - 1.0 E.U./dL Final    Extra Tube 08/15/2024 Hold for add-ons.    Final    Auto resulted.    RBC, UA 08/15/2024 Too Numerous to Count (A)  None Seen, 0-2 /HPF Final    WBC, UA 08/15/2024 Too Numerous to Count (A)  None Seen, 0-2 /HPF Final    Bacteria, UA 08/15/2024 None Seen  None Seen /HPF Final    Squamous Epithelial Cells, UA 08/15/2024 0-2  None Seen, 0-2 /HPF Final    Hyaline Casts, UA 08/15/2024 3-6  None Seen /LPF Final    Methodology 08/15/2024 Automated Microscopy   Final    Urine Culture 08/15/2024 Yeast isolated (A)   Final    No further workup.     Admission on 2024, Discharged on 2024   Component Date Value Ref Range Status    Glucose 2024 138 (H)  70 - 130 mg/dL Final    Reference Lab Report 2024    Final                    Value:[facility]  [address] [place] [address]  Phone: [phone] or [phone]  Fax: [phone]  Vinicius Gong M.D., Medical Director    PATIENT NAME                           LABORATORY NO.  [id number]  TEDDY COOK                         [id number]  [id number]                         AGE              SEX  SSN           CLIENT REF #  [facility] BLAS              58      1965      [id number]   [id number]    [address]                     REQUESTING M.D.     [place]ENDING [place] [address]                   LUCY BAIRES  DATE COLLECTED      DATE RECEIVED      DATE REPORTED  2024    DIAGNOSIS:  A.   APPENDIX:  Fibrous obliteration of the appendiceal tip  Incidental periappendiceal benign cyst  No identified dysplasia or malignancy  B.   COLON, RESECTION, SIGMOID COLON:  Diverticulosis coli with associated intramural abscesses  Acute serositis and serosal fibrous                           adhesions  2 benign lymph nodes  Examined margins of resection are viable  No identified dysplasia or malignancy    CLINICAL HISTORY:  Colovesical fistula    SPECIMENS RECEIVED:  A.  APPENDIX  B.  COLON, RESECTION,  "SIGMOID COLON    MICROSCOPIC DESCRIPTION:  A.  Tissue blocks are prepared and slides are examined microscopically on all  specimens. See diagnosis for details.  B.  Tissue blocks are prepared and slides are examined microscopically on all  specimens. See diagnosis for details.    Professional interpretation rendered by Destiny Carpenter M.D. at [facility], [address] [place] [address].    GROSS DESCRIPTION:  A.  Received in formalin labeled \"appendix\" is a 6 cm long by 0.8 cm in  diameter intact appendix with a moderate amount of attached  mesoappendix.  The serosa is smooth and glistening and has a 0.3 cm tan  nodule located 0.7 cm from the proximal margin.  The proximal margin is  inked black and sectioning reveals a 0.4 cm in average diameter lumen  filled                           with soft, green fecal material.  The mucosa is tan and glistening and  the unremarkable tan appendiceal wall averages 0.2 cm thick.  No masses  or fecaliths are identified.  Representative sections to include the en face  proximal margin, half of the bisected tip and cross-section to include  serosal nodule are submitted in block A1.  B.  Received in formalin labeled \"sigmoid colon\" is a 13.5 cm long by 2.5 cm  in average diameter intact, unopened and tortuous segment of sigmoid  colon encased in mildly indurated pericolonic fat.  The serosa is gray-tan  and dusky with dense adhesions.  The lumen contains a minimal amount of  soft, brown fecal material and the mucosa is tan and glistening with normal  folds.  Sectioning reveals a diffusely thickened bowel wall averaging 1 cm  thick.  Located at least 3 cm from the mucosal margins is a red-brown  fistula tract.  Located approximately 2 cm from the fistula is a possible  perforation and corresponding abscess formation.  No polyps or                           invasive  lesions are identified.  There are no enlarged lymph nodes or mesenteric  masses identified.  Representative " sections are submitted as follows: B1:  Proximal mucosal margin en face; B2-B3: Distal mucosal margin, en face;  B4-B5: Fistula; B6: Possible perforation.  HDM    REVIEWED, DIAGNOSED AND ELECTRONICALLY  SIGNED BY:    Destiny Carpenter M.D.  CPT CODES:  28952, 70199      Glucose 08/02/2024 165 (H)  70 - 130 mg/dL Final    WBC 08/03/2024 20.58 (H)  3.40 - 10.80 10*3/mm3 Final    RBC 08/03/2024 3.89 (L)  4.14 - 5.80 10*6/mm3 Final    Hemoglobin 08/03/2024 12.9 (L)  13.0 - 17.7 g/dL Final    Hematocrit 08/03/2024 38.9  37.5 - 51.0 % Final    MCV 08/03/2024 100.0 (H)  79.0 - 97.0 fL Final    MCH 08/03/2024 33.2 (H)  26.6 - 33.0 pg Final    MCHC 08/03/2024 33.2  31.5 - 35.7 g/dL Final    RDW 08/03/2024 15.4  12.3 - 15.4 % Final    RDW-SD 08/03/2024 57.1 (H)  37.0 - 54.0 fl Final    MPV 08/03/2024 10.2  6.0 - 12.0 fL Final    Platelets 08/03/2024 267  140 - 450 10*3/mm3 Final    Glucose 08/03/2024 132 (H)  65 - 99 mg/dL Final    BUN 08/03/2024 18  6 - 20 mg/dL Final    Creatinine 08/03/2024 1.26  0.76 - 1.27 mg/dL Final    Sodium 08/03/2024 139  136 - 145 mmol/L Final    Potassium 08/03/2024 4.5  3.5 - 5.2 mmol/L Final    Chloride 08/03/2024 104  98 - 107 mmol/L Final    CO2 08/03/2024 22.1  22.0 - 29.0 mmol/L Final    Calcium 08/03/2024 8.6  8.6 - 10.5 mg/dL Final    BUN/Creatinine Ratio 08/03/2024 14.3  7.0 - 25.0 Final    Anion Gap 08/03/2024 12.9  5.0 - 15.0 mmol/L Final    eGFR 08/03/2024 66.9  >60.0 mL/min/1.73 Final    C-Reactive Protein 08/03/2024 7.03 (H)  0.00 - 0.50 mg/dL Final    WBC 08/04/2024 16.24 (H)  3.40 - 10.80 10*3/mm3 Final    RBC 08/04/2024 3.70 (L)  4.14 - 5.80 10*6/mm3 Final    Hemoglobin 08/04/2024 12.0 (L)  13.0 - 17.7 g/dL Final    Hematocrit 08/04/2024 37.2 (L)  37.5 - 51.0 % Final    MCV 08/04/2024 100.5 (H)  79.0 - 97.0 fL Final    MCH 08/04/2024 32.4  26.6 - 33.0 pg Final    MCHC 08/04/2024 32.3  31.5 - 35.7 g/dL Final    RDW 08/04/2024 15.3  12.3 - 15.4 % Final    RDW-SD 08/04/2024 56.3 (H)   37.0 - 54.0 fl Final    MPV 08/04/2024 10.2  6.0 - 12.0 fL Final    Platelets 08/04/2024 240  140 - 450 10*3/mm3 Final    Glucose 08/04/2024 112 (H)  65 - 99 mg/dL Final    BUN 08/04/2024 16  6 - 20 mg/dL Final    Creatinine 08/04/2024 1.10  0.76 - 1.27 mg/dL Final    Sodium 08/04/2024 135 (L)  136 - 145 mmol/L Final    Potassium 08/04/2024 3.9  3.5 - 5.2 mmol/L Final    Slight hemolysis detected by analyzer. Result may be falsely elevated.    Chloride 08/04/2024 102  98 - 107 mmol/L Final    CO2 08/04/2024 21.2 (L)  22.0 - 29.0 mmol/L Final    Calcium 08/04/2024 8.4 (L)  8.6 - 10.5 mg/dL Final    BUN/Creatinine Ratio 08/04/2024 14.5  7.0 - 25.0 Final    Anion Gap 08/04/2024 11.8  5.0 - 15.0 mmol/L Final    eGFR 08/04/2024 78.8  >60.0 mL/min/1.73 Final    Magnesium 08/04/2024 1.8  1.6 - 2.6 mg/dL Final    Phosphorus 08/04/2024 3.0  2.5 - 4.5 mg/dL Final    WBC 08/05/2024 17.32 (H)  3.40 - 10.80 10*3/mm3 Final    RBC 08/05/2024 3.55 (L)  4.14 - 5.80 10*6/mm3 Final    Hemoglobin 08/05/2024 11.6 (L)  13.0 - 17.7 g/dL Final    Hematocrit 08/05/2024 36.0 (L)  37.5 - 51.0 % Final    MCV 08/05/2024 101.4 (H)  79.0 - 97.0 fL Final    MCH 08/05/2024 32.7  26.6 - 33.0 pg Final    MCHC 08/05/2024 32.2  31.5 - 35.7 g/dL Final    RDW 08/05/2024 15.1  12.3 - 15.4 % Final    RDW-SD 08/05/2024 57.0 (H)  37.0 - 54.0 fl Final    MPV 08/05/2024 10.0  6.0 - 12.0 fL Final    Platelets 08/05/2024 256  140 - 450 10*3/mm3 Final    Glucose 08/05/2024 120 (H)  65 - 99 mg/dL Final    BUN 08/05/2024 10  6 - 20 mg/dL Final    Creatinine 08/05/2024 0.82  0.76 - 1.27 mg/dL Final    Sodium 08/05/2024 137  136 - 145 mmol/L Final    Potassium 08/05/2024 4.0  3.5 - 5.2 mmol/L Final    Chloride 08/05/2024 107  98 - 107 mmol/L Final    CO2 08/05/2024 20.6 (L)  22.0 - 29.0 mmol/L Final    Calcium 08/05/2024 9.2  8.6 - 10.5 mg/dL Final    BUN/Creatinine Ratio 08/05/2024 12.2  7.0 - 25.0 Final    Anion Gap 08/05/2024 9.4  5.0 - 15.0 mmol/L Final    eGFR  08/05/2024 103.1  >60.0 mL/min/1.73 Final    C-Reactive Protein 08/05/2024 26.37 (H)  0.00 - 0.50 mg/dL Final    WBC 08/06/2024 15.60 (H)  3.40 - 10.80 10*3/mm3 Final    RBC 08/06/2024 3.89 (L)  4.14 - 5.80 10*6/mm3 Final    Hemoglobin 08/06/2024 12.5 (L)  13.0 - 17.7 g/dL Final    Hematocrit 08/06/2024 38.5  37.5 - 51.0 % Final    MCV 08/06/2024 99.0 (H)  79.0 - 97.0 fL Final    MCH 08/06/2024 32.1  26.6 - 33.0 pg Final    MCHC 08/06/2024 32.5  31.5 - 35.7 g/dL Final    RDW 08/06/2024 15.2  12.3 - 15.4 % Final    RDW-SD 08/06/2024 54.7 (H)  37.0 - 54.0 fl Final    MPV 08/06/2024 10.3  6.0 - 12.0 fL Final    Platelets 08/06/2024 297  140 - 450 10*3/mm3 Final    C-Reactive Protein 08/06/2024 19.34 (H)  0.00 - 0.50 mg/dL Final    WBC 08/07/2024 17.34 (H)  3.40 - 10.80 10*3/mm3 Final    RBC 08/07/2024 3.96 (L)  4.14 - 5.80 10*6/mm3 Final    Hemoglobin 08/07/2024 12.8 (L)  13.0 - 17.7 g/dL Final    Hematocrit 08/07/2024 40.4  37.5 - 51.0 % Final    MCV 08/07/2024 102.0 (H)  79.0 - 97.0 fL Final    MCH 08/07/2024 32.3  26.6 - 33.0 pg Final    MCHC 08/07/2024 31.7  31.5 - 35.7 g/dL Final    RDW 08/07/2024 15.1  12.3 - 15.4 % Final    RDW-SD 08/07/2024 56.9 (H)  37.0 - 54.0 fl Final    MPV 08/07/2024 9.9  6.0 - 12.0 fL Final    Platelets 08/07/2024 320  140 - 450 10*3/mm3 Final    C-Reactive Protein 08/07/2024 13.52 (H)  0.00 - 0.50 mg/dL Final    Procalcitonin 08/07/2024 0.16  0.00 - 0.25 ng/mL Final    proBNP 08/07/2024 332.6  0.0 - 900.0 pg/mL Final    Site 08/07/2024 Right Radial   Final    Jesús's Test 08/07/2024 Positive   Final    pH, Arterial 08/07/2024 7.441  7.350 - 7.450 pH units Final    pCO2, Arterial 08/07/2024 28.9 (L)  35.0 - 45.0 mm Hg Final    84 Value below reference range    pO2, Arterial 08/07/2024 63.0 (L)  83.0 - 108.0 mm Hg Final    84 Value below reference range    HCO3, Arterial 08/07/2024 19.7 (L)  20.0 - 26.0 mmol/L Final    84 Value below reference range    Base Excess, Arterial 08/07/2024 -3.3  (L)  0.0 - 2.0 mmol/L Final    O2 Saturation, Arterial 08/07/2024 92.7 (L)  94.0 - 99.0 % Final    84 Value below reference range    Hemoglobin, Blood Gas 08/07/2024 12.9 (L)  14 - 18 g/dL Final    84 Value below reference range    Hematocrit, Blood Gas 08/07/2024 39.6  38.0 - 51.0 % Final    Oxyhemoglobin 08/07/2024 91.3 (L)  94 - 99 % Final    84 Value below reference range    Methemoglobin 08/07/2024 0.20  0.00 - 3.00 % Final    Carboxyhemoglobin 08/07/2024 1.3  0 - 5 % Final    A-a DO2 08/07/2024 148.6  0.0 - 300.0 mmHg Final    CO2 Content 08/07/2024 20.6 (L)  22 - 33 mmol/L Final    Barometric Pressure for Blood Gas 08/07/2024 723  mmHg Final    Modality 08/07/2024 Nasal Cannula   Final    FIO2 08/07/2024 36  % Final    Flow Rate 08/07/2024 4.0  lpm Final    Ventilator Mode 08/07/2024 NA   Final    Collected by 08/07/2024 [id number]   Final    Meter: [id number]     :  [id number]    Respiratory Culture 08/07/2024 Moderate growth (3+) Normal respiratory daryn. No S. aureus or Pseudomonas aeruginosa detected. Final report.   Final    Gram Stain 08/07/2024 Few (2+) Epithelial cells seen   Final    Gram Stain 08/07/2024 Moderate (3+) WBCs seen   Final    Gram Stain 08/07/2024 Many (4+) Mixed bacterial morphotypes seen on Gram Stain   Final    C-Reactive Protein 08/08/2024 10.00 (H)  0.00 - 0.50 mg/dL Final    Glucose 08/08/2024 175 (H)  65 - 99 mg/dL Final    BUN 08/08/2024 16  6 - 20 mg/dL Final    Creatinine 08/08/2024 0.85  0.76 - 1.27 mg/dL Final    Sodium 08/08/2024 136  136 - 145 mmol/L Final    Potassium 08/08/2024 4.3  3.5 - 5.2 mmol/L Final    Chloride 08/08/2024 106  98 - 107 mmol/L Final    CO2 08/08/2024 17.1 (L)  22.0 - 29.0 mmol/L Final    Calcium 08/08/2024 9.2  8.6 - 10.5 mg/dL Final    BUN/Creatinine Ratio 08/08/2024 18.8  7.0 - 25.0 Final    Anion Gap 08/08/2024 12.9  5.0 - 15.0 mmol/L Final    eGFR 08/08/2024 102.0  >60.0 mL/min/1.73 Final    WBC 08/08/2024 15.36 (H)  3.40 - 10.80  10*3/mm3 Final    RBC 08/08/2024 3.96 (L)  4.14 - 5.80 10*6/mm3 Final    Hemoglobin 08/08/2024 13.1  13.0 - 17.7 g/dL Final    Hematocrit 08/08/2024 39.5  37.5 - 51.0 % Final    MCV 08/08/2024 99.7 (H)  79.0 - 97.0 fL Final    MCH 08/08/2024 33.1 (H)  26.6 - 33.0 pg Final    MCHC 08/08/2024 33.2  31.5 - 35.7 g/dL Final    RDW 08/08/2024 14.8  12.3 - 15.4 % Final    RDW-SD 08/08/2024 54.9 (H)  37.0 - 54.0 fl Final    MPV 08/08/2024 10.7  6.0 - 12.0 fL Final    Platelets 08/08/2024 393  140 - 450 10*3/mm3 Final    Neutrophil % 08/08/2024 86.9 (H)  42.7 - 76.0 % Final    Lymphocyte % 08/08/2024 9.0 (L)  19.6 - 45.3 % Final    Monocyte % 08/08/2024 2.7 (L)  5.0 - 12.0 % Final    Eosinophil % 08/08/2024 0.1 (L)  0.3 - 6.2 % Final    Basophil % 08/08/2024 0.3  0.0 - 1.5 % Final    Immature Grans % 08/08/2024 1.0 (H)  0.0 - 0.5 % Final    Neutrophils, Absolute 08/08/2024 13.33 (H)  1.70 - 7.00 10*3/mm3 Final    Lymphocytes, Absolute 08/08/2024 1.39  0.70 - 3.10 10*3/mm3 Final    Monocytes, Absolute 08/08/2024 0.42  0.10 - 0.90 10*3/mm3 Final    Eosinophils, Absolute 08/08/2024 0.01  0.00 - 0.40 10*3/mm3 Final    Basophils, Absolute 08/08/2024 0.05  0.00 - 0.20 10*3/mm3 Final    Immature Grans, Absolute 08/08/2024 0.16 (H)  0.00 - 0.05 10*3/mm3 Final    nRBC 08/08/2024 0.0  0.0 - 0.2 /100 WBC Final    WBC 08/09/2024 23.38 (H)  3.40 - 10.80 10*3/mm3 Final    RBC 08/09/2024 3.96 (L)  4.14 - 5.80 10*6/mm3 Final    Hemoglobin 08/09/2024 12.9 (L)  13.0 - 17.7 g/dL Final    Hematocrit 08/09/2024 39.6  37.5 - 51.0 % Final    MCV 08/09/2024 100.0 (H)  79.0 - 97.0 fL Final    MCH 08/09/2024 32.6  26.6 - 33.0 pg Final    MCHC 08/09/2024 32.6  31.5 - 35.7 g/dL Final    RDW 08/09/2024 15.1  12.3 - 15.4 % Final    RDW-SD 08/09/2024 56.1 (H)  37.0 - 54.0 fl Final    MPV 08/09/2024 10.8  6.0 - 12.0 fL Final    Platelets 08/09/2024 449  140 - 450 10*3/mm3 Final    Neutrophil % 08/09/2024 79.7 (H)  42.7 - 76.0 % Final    Lymphocyte %  08/09/2024 10.9 (L)  19.6 - 45.3 % Final    Monocyte % 08/09/2024 7.0  5.0 - 12.0 % Final    Eosinophil % 08/09/2024 0.0 (L)  0.3 - 6.2 % Final    Basophil % 08/09/2024 0.3  0.0 - 1.5 % Final    Immature Grans % 08/09/2024 2.1 (H)  0.0 - 0.5 % Final    Neutrophils, Absolute 08/09/2024 18.62 (H)  1.70 - 7.00 10*3/mm3 Final    Lymphocytes, Absolute 08/09/2024 2.55  0.70 - 3.10 10*3/mm3 Final    Monocytes, Absolute 08/09/2024 1.64 (H)  0.10 - 0.90 10*3/mm3 Final    Eosinophils, Absolute 08/09/2024 0.00  0.00 - 0.40 10*3/mm3 Final    Basophils, Absolute 08/09/2024 0.07  0.00 - 0.20 10*3/mm3 Final    Immature Grans, Absolute 08/09/2024 0.50 (H)  0.00 - 0.05 10*3/mm3 Final    nRBC 08/09/2024 0.0  0.0 - 0.2 /100 WBC Final   Pre-Admission Testing on 07/31/2024   Component Date Value Ref Range Status    Glucose 07/31/2024 113 (H)  65 - 99 mg/dL Final    BUN 07/31/2024 11  6 - 20 mg/dL Final    Creatinine 07/31/2024 0.83  0.76 - 1.27 mg/dL Final    Sodium 07/31/2024 142  136 - 145 mmol/L Final    Potassium 07/31/2024 3.9  3.5 - 5.2 mmol/L Final    Chloride 07/31/2024 104  98 - 107 mmol/L Final    CO2 07/31/2024 20.9 (L)  22.0 - 29.0 mmol/L Final    Calcium 07/31/2024 9.8  8.6 - 10.5 mg/dL Final    BUN/Creatinine Ratio 07/31/2024 13.3  7.0 - 25.0 Final    Anion Gap 07/31/2024 17.1 (H)  5.0 - 15.0 mmol/L Final    eGFR 07/31/2024 102.7  >60.0 mL/min/1.73 Final    WBC 07/31/2024 10.97 (H)  3.40 - 10.80 10*3/mm3 Final    RBC 07/31/2024 4.85  4.14 - 5.80 10*6/mm3 Final    Hemoglobin 07/31/2024 15.7  13.0 - 17.7 g/dL Final    Hematocrit 07/31/2024 47.2  37.5 - 51.0 % Final    MCV 07/31/2024 97.3 (H)  79.0 - 97.0 fL Final    MCH 07/31/2024 32.4  26.6 - 33.0 pg Final    MCHC 07/31/2024 33.3  31.5 - 35.7 g/dL Final    RDW 07/31/2024 15.3  12.3 - 15.4 % Final    RDW-SD 07/31/2024 55.2 (H)  37.0 - 54.0 fl Final    MPV 07/31/2024 10.1  6.0 - 12.0 fL Final    Platelets 07/31/2024 304  140 - 450 10*3/mm3 Final    QT Interval 07/31/2024 402   ms Final    QTC Interval 07/31/2024 448  ms Final   Admission on 07/18/2024, Discharged on 07/18/2024   Component Date Value Ref Range Status    Glucose 07/18/2024 138 (H)  70 - 130 mg/dL Final   Office Visit on 06/11/2024   Component Date Value Ref Range Status    Color 06/11/2024 Gayathri  Yellow, Straw, Dark Yellow, Gayatrhi Final    Clarity, UA 06/11/2024 Clear  Clear Final    Specific Gravity  06/11/2024 1.010  1.005 - 1.030 Final    pH, Urine 06/11/2024 7.5  5.0 - 8.0 Final    Leukocytes 06/11/2024 Moderate (2+) (A)  Negative Final    Nitrite, UA 06/11/2024 Negative  Negative Final    Protein, POC 06/11/2024 1+ (A)  Negative mg/dL Final    Glucose, UA 06/11/2024 Negative  Negative mg/dL Final    Ketones, UA 06/11/2024 Trace (A)  Negative Final    Urobilinogen, UA 06/11/2024 Normal  Normal, 0.2 E.U./dL Final    Bilirubin 06/11/2024 Small (1+) (A)  Negative Final    Blood, UA 06/11/2024 Trace (A)  Negative Final    Lot Number 06/11/2024 [id number]   Final    Expiration Date 06/11/2024 10/25/24   Final    Urine Culture 06/11/2024 No growth   Final    PSA 06/11/2024 0.8  0.0 - 4.0 ng/mL Final    Roche ECLIA methodology.  According to the American Urological Association, Serum PSA should  decrease and remain at undetectable levels after radical  prostatectomy. The AUA defines biochemical recurrence as an initial  PSA value 0.2 ng/mL or greater followed by a subsequent confirmatory  PSA value 0.2 ng/mL or greater.  Values obtained with different assay methods or kits cannot be used  interchangeably. Results cannot be interpreted as absolute evidence  of the presence or absence of malignant disease.    PSA, Free 06/11/2024 0.06  N/A ng/mL Final    Roche ECLIA methodology.    PSA, Free % 06/11/2024 7.5  % Final    The table below lists the probability of prostate cancer for  men with non-suspicious CHAGO results and total PSA between  4 and 10 ng/mL, by patient age (Romario et al, MADDIE 1998,  279:1542).                     % Free PSA       50-64 yr        65-75 yr                    0.00-10.00%        56%             55%                   10.01-15.00%        24%             35%                   15.01-20.00%        17%             23%                   20.01-25.00%        10%             20%                        >25.00%         5%              9%  Please note:  Romario et al did not make specific                recommendations regarding the use of                percent free PSA for any other population                of men.        Procedure:Patient presents today for cystourethroscopy.  I went ahead and obtained an informed consent including the risks of anesthesia, bleeding, infection, etc.  After prepping and draping in a sterile fashion in the low dorsal lithotomy position, the urethra was gently anesthetized with 10 mL of 2% viscous Xylocaine jelly.  After an appropriate period of topical anesthesia, I used the Olympus digital 14 Irish flexible cystoscope to examine the anterior urethra which showed significant irritation and edema.  Prostate was enlarged with a noticeable median bar.  Upon entrance of the bladder I was unable to see anything due to significant cystitis and cloudy urine.  We were able to barely visualize his bilateral double J stent's and was unsuccessful in removal in office.  Will get him scheduled in the OR.  The patient was given 80 mg of gentamicin in an intramuscular fashion as prophylaxis for the cystoscopy and released from the clinic.   Procedures     Assessment/Plan:   Problem List Items Addressed This Visit       Frequency of micturition - Primary    Relevant Medications    gentamicin (GARAMYCIN) injection 80 mg (Completed)       Health Maintenance:   Health Maintenance Due   Topic Date Due    URINE MICROALBUMIN  Never done    Pneumococcal Vaccine 0-64 (1 of 2 - PCV) Never done    DIABETIC EYE EXAM  Never done    Hepatitis B (1 of 3 - 19+ 3-dose series) Never done    LIPID PANEL  11/23/2016     LUNG CANCER SCREENING  Never done    COVID-19 Vaccine (3 - 2023-24 season) 09/01/2023    HEPATITIS C SCREENING  Never done    ANNUAL PHYSICAL  Never done    DIABETIC FOOT EXAM  Never done    INFLUENZA VACCINE  08/01/2024        Smoking Counseling: Everyday smoker.  Former user of smokeless tobacco.  Counseling given.    Urine Incontinence: Patient reports that he is not currently experiencing any symptoms of urinary incontinence.    Patient was given instructions and counseling regarding his condition or for health maintenance advice. Please see specific information pulled into the AVS if appropriate.    Patient Education:   Colovesicular fistula -patient had recent cystogram that showed no evidence of fistula or leakage.  I did attempt a cystoscopy in office however was unsuccessful due to patient's significant cloudy urine and notable cystitis.  I was unable to visualize the stents well enough to remove.  Patient was unable to tolerate cystoscopy in office as well and was very uncomfortable.  We will give him a shot of gentamicin 80 mg in office today for UTI prophylaxis and send the patient's urine off for culture.  Will get him scheduled for a cystoscopy with double-J stent removal this Monday in the OR.    Visit Diagnoses:    ICD-10-CM ICD-9-CM   1. 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      This document has been electronically signed by Peterson Taylor PA-C   August 28, 2024 12:14 EDT    Part of this note may be an electronic transcription/translation of spoken language to printed text using the Dragon Dictation System.

## 2024-09-04 NOTE — DISCHARGE INSTRUCTIONS
Please discontinue all blood thinners and anticoagulants (except aspirin) prior to surgery as per your surgeon and cardiologist instructions.  Aspirin may be continued up to the day prior to surgery.    HOLD all diabetic medications the morning of surgery as order by physician.    Please follow instructions on use of prep cloths provided by nurse. Return instruction sheet to pre-op nurse on day of surgery.    Arrival time for surgery on 9/9/24  will be given to you by Dr. Salazar's  Office.    A RESPONSIBLE PERSON MUST REMAIN IN THE WAITING ROOM DURING YOUR PROCEDURE AND A RESPONSIBLE  MUST BE AVAILABLE UPON YOUR DISCHARGE.    General Instructions:  Do NOT eat or drink after midnight 9/8/24 which includes water, mints, or gum.  You may brush your teeth. Dental appliances that are removable must be taken out day of surgery.  Do NOT smoke, chew tobacco, or drink alcohol within 24 hours prior to surgery.  Bring medications in original bottles, any inhalers and if applicable your C-PAP/BI-PAP machine  Bring any papers given to you in the doctor’s office  Wear clean, comfortable clothes and socks  Do NOT wear contact lenses or make-up or dark nail polish.  Bring a case for your glasses if applicable.  Bring crutches or walker if applicable  Leave all other valuables and jewelry at home  If you were given a blood bank armband, continue to wear it until discharged.    Preventing a Surgical Site Infection:  Shower the night before surgery (unless instructed otherwise) using a fresh bar of anti-bacterial soap (such as Dial) and clean washcloth.  Dry with a clean towel and dress in clean clothing.  For 2 to 3 days before surgery, avoid shaving with a razor near where you will have surgery because the razor can irritate skin and make it easier to develop an infection.  Ask your surgeon if you will be receiving antibiotics prior to surgery.  Make sure you, your family, and all healthcare providers clean their hands with  soap and water or an alcohol-based hand  before caring for you or your wound.  If at all possible, quit smoking as many days before surgery as you can.    Day of Surgery:  Upon arrival, a pre-op nurse and anesthesiologist will review your health history, obtain vital signs, and answer questions you may have.  The only belongings needed at this time will be your home medications and if applicable you C-PAP/BI-PAP machine.  If you are staying overnight, your family can leave the rest of your belongings in the car and bring them to your room later.  A pre-op nurse will start an IV and you may receive medication in preparation for surgery.  Due to patient privacy and limited space, only one member of your family will be able to accompany you in the pre-op area.  While you are in surgery your family should notify the waiting room  if they leave the waiting room area and provide a contact number.  Please be aware that surgery does come with discomfort.  We want to make every effort to control your discomfort so please discuss any uncontrolled symptoms with your nurse.  Your doctor will most likely have prescribed pain medications.  If you are going home after surgery you will receive individualized written care instructions before being discharged.  A responsible adult must drive you to and from the hospital on the day of surgery and stay with you for 24 hours.  If you are staying overnight following surgery, you will be transported to your hospital room following the recovery period.

## 2024-09-06 ENCOUNTER — PRE-ADMISSION TESTING (OUTPATIENT)
Dept: PREADMISSION TESTING | Facility: HOSPITAL | Age: 59
End: 2024-09-06
Payer: COMMERCIAL

## 2024-09-06 LAB
ANION GAP SERPL CALCULATED.3IONS-SCNC: 15.1 MMOL/L (ref 5–15)
BUN SERPL-MCNC: 13 MG/DL (ref 6–20)
BUN/CREAT SERPL: 12.1 (ref 7–25)
CALCIUM SPEC-SCNC: 9.7 MG/DL (ref 8.6–10.5)
CHLORIDE SERPL-SCNC: 99 MMOL/L (ref 98–107)
CO2 SERPL-SCNC: 26.9 MMOL/L (ref 22–29)
CREAT SERPL-MCNC: 1.07 MG/DL (ref 0.76–1.27)
DEPRECATED RDW RBC AUTO: 53 FL (ref 37–54)
EGFRCR SERPLBLD CKD-EPI 2021: 81.4 ML/MIN/1.73
ERYTHROCYTE [DISTWIDTH] IN BLOOD BY AUTOMATED COUNT: 15 % (ref 12.3–15.4)
GLUCOSE SERPL-MCNC: 107 MG/DL (ref 65–99)
HCT VFR BLD AUTO: 40 % (ref 37.5–51)
HGB BLD-MCNC: 12.8 G/DL (ref 13–17.7)
MCH RBC QN AUTO: 30.9 PG (ref 26.6–33)
MCHC RBC AUTO-ENTMCNC: 32 G/DL (ref 31.5–35.7)
MCV RBC AUTO: 96.6 FL (ref 79–97)
PLATELET # BLD AUTO: 541 10*3/MM3 (ref 140–450)
PMV BLD AUTO: 10 FL (ref 6–12)
POTASSIUM SERPL-SCNC: 3.7 MMOL/L (ref 3.5–5.2)
RBC # BLD AUTO: 4.14 10*6/MM3 (ref 4.14–5.8)
SODIUM SERPL-SCNC: 141 MMOL/L (ref 136–145)
WBC NRBC COR # BLD AUTO: 11.81 10*3/MM3 (ref 3.4–10.8)

## 2024-09-06 PROCEDURE — 85027 COMPLETE CBC AUTOMATED: CPT

## 2024-09-06 PROCEDURE — 80048 BASIC METABOLIC PNL TOTAL CA: CPT

## 2024-09-06 PROCEDURE — 36415 COLL VENOUS BLD VENIPUNCTURE: CPT

## 2024-09-09 ENCOUNTER — ANESTHESIA (OUTPATIENT)
Dept: PERIOP | Facility: HOSPITAL | Age: 59
End: 2024-09-09
Payer: COMMERCIAL

## 2024-09-09 ENCOUNTER — ANESTHESIA EVENT (OUTPATIENT)
Dept: PERIOP | Facility: HOSPITAL | Age: 59
End: 2024-09-09
Payer: COMMERCIAL

## 2024-09-09 ENCOUNTER — APPOINTMENT (OUTPATIENT)
Dept: GENERAL RADIOLOGY | Facility: HOSPITAL | Age: 59
End: 2024-09-09
Payer: COMMERCIAL

## 2024-09-09 ENCOUNTER — HOSPITAL ENCOUNTER (OUTPATIENT)
Facility: HOSPITAL | Age: 59
Setting detail: HOSPITAL OUTPATIENT SURGERY
Discharge: HOME | End: 2024-09-09
Attending: UROLOGY | Admitting: UROLOGY
Payer: COMMERCIAL

## 2024-09-09 VITALS
BODY MASS INDEX: 37.33 KG/M2 | HEIGHT: 69 IN | SYSTOLIC BLOOD PRESSURE: 125 MMHG | RESPIRATION RATE: 16 BRPM | TEMPERATURE: 97.9 F | WEIGHT: 252 LBS | DIASTOLIC BLOOD PRESSURE: 82 MMHG | HEART RATE: 75 BPM | OXYGEN SATURATION: 97 %

## 2024-09-09 LAB — GLUCOSE BLDC GLUCOMTR-MCNC: 137 MG/DL (ref 70–130)

## 2024-09-09 PROCEDURE — 25010000002 PROPOFOL 200 MG/20ML EMULSION: Performed by: NURSE ANESTHETIST, CERTIFIED REGISTERED

## 2024-09-09 PROCEDURE — 25010000002 GENTAMICIN PER 80 MG: Performed by: NURSE ANESTHETIST, CERTIFIED REGISTERED

## 2024-09-09 PROCEDURE — 25810000003 LACTATED RINGERS PER 1000 ML: Performed by: ANESTHESIOLOGY

## 2024-09-09 PROCEDURE — 52310 CYSTOSCOPY AND TREATMENT: CPT | Performed by: UROLOGY

## 2024-09-09 PROCEDURE — 25010000002 MIDAZOLAM PER 1 MG: Performed by: NURSE ANESTHETIST, CERTIFIED REGISTERED

## 2024-09-09 PROCEDURE — 82948 REAGENT STRIP/BLOOD GLUCOSE: CPT

## 2024-09-09 PROCEDURE — 25010000002 ONDANSETRON PER 1 MG: Performed by: NURSE ANESTHETIST, CERTIFIED REGISTERED

## 2024-09-09 PROCEDURE — 25810000003 LACTATED RINGERS PER 1000 ML: Performed by: NURSE ANESTHETIST, CERTIFIED REGISTERED

## 2024-09-09 PROCEDURE — 25010000002 MIDAZOLAM PER 1 MG: Performed by: ANESTHESIOLOGY

## 2024-09-09 PROCEDURE — 25010000002 FENTANYL CITRATE (PF) 50 MCG/ML SOLUTION: Performed by: NURSE ANESTHETIST, CERTIFIED REGISTERED

## 2024-09-09 RX ORDER — FENTANYL CITRATE 50 UG/ML
50 INJECTION, SOLUTION INTRAMUSCULAR; INTRAVENOUS
Status: DISCONTINUED | OUTPATIENT
Start: 2024-09-09 | End: 2024-09-09 | Stop reason: HOSPADM

## 2024-09-09 RX ORDER — MIDAZOLAM HYDROCHLORIDE 1 MG/ML
1 INJECTION INTRAMUSCULAR; INTRAVENOUS
Status: COMPLETED | OUTPATIENT
Start: 2024-09-09 | End: 2024-09-09

## 2024-09-09 RX ORDER — SODIUM CHLORIDE, SODIUM LACTATE, POTASSIUM CHLORIDE, CALCIUM CHLORIDE 600; 310; 30; 20 MG/100ML; MG/100ML; MG/100ML; MG/100ML
125 INJECTION, SOLUTION INTRAVENOUS ONCE
Status: COMPLETED | OUTPATIENT
Start: 2024-09-09 | End: 2024-09-09

## 2024-09-09 RX ORDER — OXYCODONE AND ACETAMINOPHEN 5; 325 MG/1; MG/1
1 TABLET ORAL ONCE AS NEEDED
Status: DISCONTINUED | OUTPATIENT
Start: 2024-09-09 | End: 2024-09-09 | Stop reason: HOSPADM

## 2024-09-09 RX ORDER — MEPERIDINE HYDROCHLORIDE 25 MG/ML
12.5 INJECTION INTRAMUSCULAR; INTRAVENOUS; SUBCUTANEOUS
Status: DISCONTINUED | OUTPATIENT
Start: 2024-09-09 | End: 2024-09-09 | Stop reason: HOSPADM

## 2024-09-09 RX ORDER — SODIUM CHLORIDE 9 MG/ML
40 INJECTION, SOLUTION INTRAVENOUS AS NEEDED
Status: DISCONTINUED | OUTPATIENT
Start: 2024-09-09 | End: 2024-09-09 | Stop reason: HOSPADM

## 2024-09-09 RX ORDER — ONDANSETRON 2 MG/ML
4 INJECTION INTRAMUSCULAR; INTRAVENOUS AS NEEDED
Status: DISCONTINUED | OUTPATIENT
Start: 2024-09-09 | End: 2024-09-09 | Stop reason: HOSPADM

## 2024-09-09 RX ORDER — SODIUM CHLORIDE, SODIUM LACTATE, POTASSIUM CHLORIDE, CALCIUM CHLORIDE 600; 310; 30; 20 MG/100ML; MG/100ML; MG/100ML; MG/100ML
INJECTION, SOLUTION INTRAVENOUS CONTINUOUS PRN
Status: DISCONTINUED | OUTPATIENT
Start: 2024-09-09 | End: 2024-09-09 | Stop reason: SURG

## 2024-09-09 RX ORDER — LIDOCAINE HYDROCHLORIDE 20 MG/ML
JELLY TOPICAL AS NEEDED
Status: DISCONTINUED | OUTPATIENT
Start: 2024-09-09 | End: 2024-09-09 | Stop reason: HOSPADM

## 2024-09-09 RX ORDER — IPRATROPIUM BROMIDE AND ALBUTEROL SULFATE 2.5; .5 MG/3ML; MG/3ML
3 SOLUTION RESPIRATORY (INHALATION) ONCE AS NEEDED
Status: DISCONTINUED | OUTPATIENT
Start: 2024-09-09 | End: 2024-09-09 | Stop reason: HOSPADM

## 2024-09-09 RX ORDER — SODIUM CHLORIDE 0.9 % (FLUSH) 0.9 %
10 SYRINGE (ML) INJECTION AS NEEDED
Status: DISCONTINUED | OUTPATIENT
Start: 2024-09-09 | End: 2024-09-09 | Stop reason: HOSPADM

## 2024-09-09 RX ORDER — SODIUM CHLORIDE, SODIUM LACTATE, POTASSIUM CHLORIDE, CALCIUM CHLORIDE 600; 310; 30; 20 MG/100ML; MG/100ML; MG/100ML; MG/100ML
100 INJECTION, SOLUTION INTRAVENOUS ONCE AS NEEDED
Status: DISCONTINUED | OUTPATIENT
Start: 2024-09-09 | End: 2024-09-09 | Stop reason: HOSPADM

## 2024-09-09 RX ORDER — GENTAMICIN 40 MG/ML
INJECTION, SOLUTION INTRAMUSCULAR; INTRAVENOUS AS NEEDED
Status: DISCONTINUED | OUTPATIENT
Start: 2024-09-09 | End: 2024-09-09 | Stop reason: SURG

## 2024-09-09 RX ORDER — FENTANYL CITRATE 50 UG/ML
INJECTION, SOLUTION INTRAMUSCULAR; INTRAVENOUS AS NEEDED
Status: DISCONTINUED | OUTPATIENT
Start: 2024-09-09 | End: 2024-09-09 | Stop reason: SURG

## 2024-09-09 RX ORDER — SODIUM CHLORIDE 0.9 % (FLUSH) 0.9 %
10 SYRINGE (ML) INJECTION EVERY 12 HOURS SCHEDULED
Status: DISCONTINUED | OUTPATIENT
Start: 2024-09-09 | End: 2024-09-09 | Stop reason: HOSPADM

## 2024-09-09 RX ORDER — ONDANSETRON 2 MG/ML
INJECTION INTRAMUSCULAR; INTRAVENOUS AS NEEDED
Status: DISCONTINUED | OUTPATIENT
Start: 2024-09-09 | End: 2024-09-09 | Stop reason: SURG

## 2024-09-09 RX ORDER — MIDAZOLAM HYDROCHLORIDE 1 MG/ML
INJECTION INTRAMUSCULAR; INTRAVENOUS AS NEEDED
Status: DISCONTINUED | OUTPATIENT
Start: 2024-09-09 | End: 2024-09-09 | Stop reason: SURG

## 2024-09-09 RX ORDER — PROPOFOL 10 MG/ML
INJECTION, EMULSION INTRAVENOUS AS NEEDED
Status: DISCONTINUED | OUTPATIENT
Start: 2024-09-09 | End: 2024-09-09 | Stop reason: SURG

## 2024-09-09 RX ORDER — FAMOTIDINE 10 MG/ML
INJECTION, SOLUTION INTRAVENOUS AS NEEDED
Status: DISCONTINUED | OUTPATIENT
Start: 2024-09-09 | End: 2024-09-09 | Stop reason: SURG

## 2024-09-09 RX ORDER — KETOROLAC TROMETHAMINE 30 MG/ML
30 INJECTION, SOLUTION INTRAMUSCULAR; INTRAVENOUS EVERY 6 HOURS PRN
Status: DISCONTINUED | OUTPATIENT
Start: 2024-09-09 | End: 2024-09-09 | Stop reason: HOSPADM

## 2024-09-09 RX ADMIN — MIDAZOLAM HYDROCHLORIDE 1 MG: 1 INJECTION, SOLUTION INTRAMUSCULAR; INTRAVENOUS at 10:06

## 2024-09-09 RX ADMIN — PROPOFOL 100 MG: 10 INJECTION, EMULSION INTRAVENOUS at 11:13

## 2024-09-09 RX ADMIN — FAMOTIDINE 20 MG: 10 INJECTION, SOLUTION INTRAVENOUS at 11:09

## 2024-09-09 RX ADMIN — SODIUM CHLORIDE, POTASSIUM CHLORIDE, SODIUM LACTATE AND CALCIUM CHLORIDE: 600; 310; 30; 20 INJECTION, SOLUTION INTRAVENOUS at 11:09

## 2024-09-09 RX ADMIN — MIDAZOLAM HYDROCHLORIDE 2 MG: 1 INJECTION, SOLUTION INTRAMUSCULAR; INTRAVENOUS at 11:09

## 2024-09-09 RX ADMIN — MIDAZOLAM HYDROCHLORIDE 1 MG: 1 INJECTION, SOLUTION INTRAMUSCULAR; INTRAVENOUS at 10:13

## 2024-09-09 RX ADMIN — ONDANSETRON 4 MG: 2 INJECTION INTRAMUSCULAR; INTRAVENOUS at 11:09

## 2024-09-09 RX ADMIN — FENTANYL CITRATE 100 MCG: 50 INJECTION INTRAMUSCULAR; INTRAVENOUS at 11:09

## 2024-09-09 RX ADMIN — SODIUM CHLORIDE, POTASSIUM CHLORIDE, SODIUM LACTATE AND CALCIUM CHLORIDE 125 ML/HR: 600; 310; 30; 20 INJECTION, SOLUTION INTRAVENOUS at 10:06

## 2024-09-09 RX ADMIN — GENTAMICIN SULFATE 80 MG: 40 INJECTION, SOLUTION INTRAMUSCULAR; INTRAVENOUS at 11:09

## 2024-09-09 RX ADMIN — PROPOFOL 100 MG: 10 INJECTION, EMULSION INTRAVENOUS at 11:18

## 2024-09-09 NOTE — ANESTHESIA PREPROCEDURE EVALUATION
Anesthesia Evaluation     Patient summary reviewed and Nursing notes reviewed   no history of anesthetic complications:   NPO Solid Status: > 8 hours  NPO Liquid Status: > 8 hours           Airway   Mallampati: II  TM distance: >3 FB  Neck ROM: full  No difficulty expected  Dental    (+) poor dentition    Pulmonary    (+) a smoker Current, Smoked day of surgery, COPD moderate,rhonchi  Cardiovascular - normal exam  Exercise tolerance: good (4-7 METS)    NYHA Classification: II  ECG reviewed  Patient on routine beta blocker and Beta blocker given within 24 hours of surgery  Rhythm: regular  Rate: normal    (+) hypertension, past MI , CAD, cardiac stents (stents 11/2015 3 vessel) , CHF , hyperlipidemia      Neuro/Psych- negative ROS  GI/Hepatic/Renal/Endo    (+) obesity, morbid obesity, GERD, diabetes mellitus    Musculoskeletal (-) negative ROS    Abdominal  - normal exam    Abdomen: soft.  Bowel sounds: normal.   Substance History - negative use     OB/GYN negative ob/gyn ROS         Other        ROS/Med Hx Other: Last Ozempic  7/25                  Anesthesia Plan    ASA 3     general     (Possible TAP block.)  intravenous induction     Anesthetic plan, risks, benefits, and alternatives have been provided, discussed and informed consent has been obtained with: patient.  Pre-procedure education provided  Use of blood products discussed with  Consented to blood products.    Plan discussed with CRNA.      CODE STATUS:

## 2024-09-09 NOTE — OP NOTE
Celestine Arriaga  9/9/2024    Pre-op Diagnosis:   Colovesical fistula [N32.1]    Post-op Diagnosis:     Post-Op Diagnosis Codes:     * Colovesical fistula [N32.1]    Procedure/CPT® Codes:  58-year-old white male with retained stents after a colostomy and a colovesical repair he had a negative cystogram he is now here for stent removal.  Cystoscopy and stent removal: Following an appropriate informed consent including the risk of infection, the patient was pretreated with 80 mg of intramuscular gentamicin.  Following this, the patient was prepped and draped in a sterile fashion using the flexible cystoscope. I went ahead and examined the bladder, identified the bilateral stents exiting the ureteral orifices.  They were grasped with grasping forceps and removed without complication.  The patient tolerated it well.      Procedure(s):  CYSTOSCOPY stent removal bilaterally    Surgeon(s):  Gibran Salazar MD    Anesthesia: see anesthesia record    Staff:   Circulator: Little Bailey RN  Scrub Person: Kiarra Olivia LPN; Jocelyne Robledo    Estimated Blood Loss: none  Urine Voided: * No values recorded between 9/9/2024 11:10 AM and 9/9/2024 11:19 AM *    Specimens:                None      Drains: None    Findings: Bilateral stent removal    Blood: N/A    Complications: None    Grafts and Implants: None    Gibran Salazar MD     Date: 9/9/2024  Time: 11:23 EDT

## 2024-09-09 NOTE — ANESTHESIA POSTPROCEDURE EVALUATION
Patient: Celestine Arriaga    Procedure Summary       Date: 09/09/24 Room / Location:  [facility] /  [facility]    Anesthesia Start: 1109 Anesthesia Stop: 1124    Procedure: CYSTOSCOPY stent removal bilaterally (Bilateral: Urethra) Diagnosis:       Colovesical fistula      (Colovesical fistula [N32.1])    Surgeons: Gibran Salazar MD Provider: Jignesh Mckeon MD    Anesthesia Type: general ASA Status: 3            Anesthesia Type: general    Vitals  Vitals Value Taken Time   /85 09/09/24 1140   Temp 97.3 °F (36.3 °C) 09/09/24 1125   Pulse 71 09/09/24 1140   Resp 18 09/09/24 1140   SpO2 94 % 09/09/24 1140           Post Anesthesia Care and Evaluation    Patient location during evaluation: bedside  Patient participation: complete - patient participated  Level of consciousness: awake and alert  Pain score: 1  Pain management: adequate    Airway patency: patent  Anesthetic complications: No anesthetic complications  PONV Status: none  Cardiovascular status: acceptable  Respiratory status: acceptable  Hydration status: acceptable

## 2024-09-12 ENCOUNTER — OFFICE VISIT (OUTPATIENT)
Dept: SURGERY | Facility: CLINIC | Age: 59
End: 2024-09-12
Payer: COMMERCIAL

## 2024-09-12 VITALS — WEIGHT: 252 LBS | HEIGHT: 69 IN | BODY MASS INDEX: 37.33 KG/M2

## 2024-09-12 DIAGNOSIS — Z90.49 STATUS POST COLON RESECTION: Primary | ICD-10-CM

## 2024-09-12 PROCEDURE — 99024 POSTOP FOLLOW-UP VISIT: CPT | Performed by: SURGERY

## 2024-09-12 NOTE — PROGRESS NOTES
Subjective   Celestine Arriaga is a 58 y.o. male.     Chief Complaint: post colectomy    History of Present Illness He is a 57 yo who had a sigmoid colectomy with diverting colostomy. He had his stents removed.     The following portions of the patient's history were reviewed and updated as appropriate: current medications, past family history, past medical history, past social history, past surgical history and problem list.    Review of Systems    Objective   Physical Exam abdomen soft and wound healed. Ostomy working.     Past Medical History:   Diagnosis Date    Arthritis     Chest pain     CHF (congestive heart failure)     COPD (chronic obstructive pulmonary disease)     Coronary artery disease     Diabetes mellitus     Diverticulitis     Elevated cholesterol     GERD (gastroesophageal reflux disease)     [place] (hard of hearing)     Hypertension     MI (myocardial infarction)     cardiac dr told him he had had a mild attack    Neuropathy     LOWER LEGS/FEET    Urinary tract infection        Family History   Problem Relation Age of Onset    No Known Problems Father     Cancer Mother        Social History     Tobacco Use    Smoking status: Every Day     Current packs/day: 1.50     Average packs/day: 1.5 packs/day for 40.7 years (61.0 ttl pk-yrs)     Types: Cigarettes     Start date: 1984     Passive exposure: Current    Smokeless tobacco: Former     Types: Chew   Vaping Use    Vaping status: Never Used   Substance Use Topics    Alcohol use: Not Currently     Alcohol/week: 2.0 standard drinks of alcohol     Types: 2 Shots of liquor per week     Comment: 2 mixed drinks a night    Drug use: Never       Past Surgical History:   Procedure Laterality Date    APPENDECTOMY N/A 08/02/2024    Procedure: APPENDECTOMY;  Surgeon: Boogie Hays MD;  Location: [facility];  Service: General;  Laterality: N/A;    BACK SURGERY      CARDIAC CATHETERIZATION      CARDIAC SURGERY      COLON RESECTION N/A 08/02/2024    Procedure: COLON  RESECTION SIGMOID;  Surgeon: Boogie Hays MD;  Location:  [facility];  Service: General;  Laterality: N/A;    COLONOSCOPY      COLONOSCOPY N/A 07/18/2024    Procedure: COLONOSCOPY;  Surgeon: Boogie Hays MD;  Location: [facility] OR;  Service: Gastroenterology;  Laterality: N/A;    COLOSTOMY N/A 08/02/2024    Procedure: COLOSTOMY LOOP;  Surgeon: Boogie Hays MD;  Location: [facility] OR;  Service: General;  Laterality: N/A;    CORONARY ANGIOPLASTY WITH STENT PLACEMENT      3 STENTS TOTAL    CYSTOSCOPY Bilateral 9/9/2024    Procedure: CYSTOSCOPY stent removal bilaterally;  Surgeon: Gibran Salazar MD;  [facility]:  [facility];  Service: Urology;  Laterality: Bilateral;    CYSTOSCOPY W/ URETERAL STENT PLACEMENT Bilateral 08/02/2024    Procedure: URETEROSCOPY URETERAL CATHETER/STENT INSERTION;  Surgeon: Boogie Hays MD;  Location: [facility] OR;  Service: General;  Laterality: Bilateral;    EYE SURGERY Right     TOE AMPUTATION Left     great toe in lawn mower accident    VASCULAR SURGERY         Current Outpatient Medications   Medication Instructions    acetaminophen (TYLENOL) 500 mg, Oral, Every 6 Hours PRN    albuterol sulfate  (90 Base) MCG/ACT inhaler 1 puff, Inhalation, Every 6 Hours PRN    amLODIPine (NORVASC) 10 mg, Oral, Daily    aspirin 81 mg, Oral, Daily    hydrALAZINE (APRESOLINE) 50 mg, Oral, 3 Times Daily    metoprolol tartrate (LOPRESSOR) 25 mg, Oral, 2 Times Daily    nitroglycerin (NITROSTAT) 0.4 mg, Sublingual, Every 5 Minutes PRN, Take no more than 3 doses in 15 minutes.    oxyCODONE-acetaminophen (PERCOCET) 5-325 MG per tablet 1 tablet, Oral, Every 6 Hours PRN    Ozempic, 0.25 or 0.5 MG/DOSE, 2 MG/3ML solution pen-injector INJECT 0.25 MG  SUBCUTANEOUSLY ONCE A WEEK    sertraline (ZOLOFT) 100 mg, Oral, Daily         Assessment & Plan   Diagnoses and all orders for this visit:    1. Status post colon resection (Primary)    Schedule BE and if ok, schedule closure of colostomy  He will not  be able to work until after that.           This document has been electronically signed by Boogie Hays MD   September 12, 2024 13:59 EDT

## 2024-10-04 ENCOUNTER — HOSPITAL ENCOUNTER (OUTPATIENT)
Dept: GENERAL RADIOLOGY | Facility: HOSPITAL | Age: 59
Discharge: HOME | End: 2024-10-04
Admitting: SURGERY
Payer: COMMERCIAL

## 2024-10-04 DIAGNOSIS — Z90.49 STATUS POST COLON RESECTION: ICD-10-CM

## 2024-10-04 PROCEDURE — 74280 X-RAY XM COLON 2CNTRST STD: CPT

## 2024-10-07 ENCOUNTER — OFFICE VISIT (OUTPATIENT)
Dept: SURGERY | Facility: CLINIC | Age: 59
End: 2024-10-07
Payer: COMMERCIAL

## 2024-10-07 VITALS — WEIGHT: 252 LBS | HEIGHT: 69 IN | BODY MASS INDEX: 37.33 KG/M2

## 2024-10-07 DIAGNOSIS — Z90.49 STATUS POST COLON RESECTION: Primary | ICD-10-CM

## 2024-10-07 PROCEDURE — 99213 OFFICE O/P EST LOW 20 MIN: CPT | Performed by: SURGERY

## 2024-10-07 RX ORDER — POLYETHYLENE GLYCOL 3350, SODIUM SULFATE ANHYDROUS, SODIUM BICARBONATE, SODIUM CHLORIDE, POTASSIUM CHLORIDE 236; 22.74; 6.74; 5.86; 2.97 G/4L; G/4L; G/4L; G/4L; G/4L
4 POWDER, FOR SOLUTION ORAL ONCE
Qty: 4000 ML | Refills: 0 | Status: SHIPPED | OUTPATIENT
Start: 2024-10-07 | End: 2024-10-08

## 2024-10-07 RX ORDER — ERYTHROMYCIN 500 MG/1
TABLET, COATED ORAL
Qty: 6 TABLET | Refills: 0 | Status: SHIPPED | OUTPATIENT
Start: 2024-10-07

## 2024-10-07 RX ORDER — ALVIMOPAN 12 MG/1
12 CAPSULE ORAL ONCE
OUTPATIENT
Start: 2024-10-07 | End: 2024-10-14

## 2024-10-07 RX ORDER — NEOMYCIN SULFATE 500 MG/1
TABLET ORAL
Qty: 6 TABLET | Refills: 0 | Status: SHIPPED | OUTPATIENT
Start: 2024-10-07

## 2024-10-07 NOTE — H&P (VIEW-ONLY)
Subjective   Celestine Arriaga is a 58 y.o. male.     Chief Complaint: post colectomy for fistula    History of Present Illness He is a obese 59 yo who had a sigmoid resection for colovesical fistula. He had diverting colostomy and the follow up barium enema showed no leak last month. He had a recent admission in [place] for chest pain but work up was ok. He does use oxygen at night.    The following portions of the patient's history were reviewed and updated as appropriate: current medications, past family history, past medical history, past social history, past surgical history and problem list.    Review of Systems   Constitutional:  Negative for activity change, appetite change, chills, fever and unexpected weight change.   HENT:  Negative for congestion, facial swelling and sore throat.    Eyes:  Negative for photophobia and visual disturbance.   Respiratory:  Negative for chest tightness, shortness of breath and wheezing.    Cardiovascular:  Negative for chest pain, palpitations and leg swelling.   Gastrointestinal:  Positive for abdominal pain. Negative for abdominal distention, anal bleeding, blood in stool, constipation, diarrhea, nausea, rectal pain and vomiting.   Endocrine: Negative for cold intolerance, heat intolerance, polydipsia and polyuria.   Genitourinary:  Negative for difficulty urinating, dysuria, flank pain and urgency.   Musculoskeletal:  Negative for back pain and myalgias.   Skin:  Negative for rash and wound.   Allergic/Immunologic: Negative for immunocompromised state.   Neurological:  Negative for dizziness, seizures, syncope, light-headedness, numbness and headaches.   Hematological:  Negative for adenopathy. Does not bruise/bleed easily.   Psychiatric/Behavioral:  Negative for behavioral problems and confusion. The patient is not nervous/anxious.        Objective   Physical Exam  Vitals reviewed.   Constitutional:       General: He is not in acute distress.     Appearance: He is  well-developed. He is not ill-appearing.   HENT:      Head: Normocephalic. No laceration. Hair is normal.      Right Ear: Hearing and ear canal normal.      Left Ear: Hearing and ear canal normal.      Nose: Nose normal.      Right Sinus: No maxillary sinus tenderness or frontal sinus tenderness.      Left Sinus: No maxillary sinus tenderness or frontal sinus tenderness.   Eyes:      General: Lids are normal.      Conjunctiva/sclera: Conjunctivae normal.      Pupils: Pupils are equal, round, and reactive to light.   Neck:      Thyroid: No thyroid mass or thyromegaly.      Vascular: No JVD.      Trachea: No tracheal tenderness or tracheal deviation.   Cardiovascular:      Rate and Rhythm: Normal rate and regular rhythm.      Heart sounds: No murmur heard.     No gallop.   Pulmonary:      Effort: Pulmonary effort is normal.      Breath sounds: Normal breath sounds. No stridor. No wheezing.   Chest:      Chest wall: No tenderness.   Abdominal:      General: Bowel sounds are normal. There is no distension.      Palpations: Abdomen is soft. There is no mass.      Tenderness: There is no abdominal tenderness. There is no guarding or rebound.      Hernia: No hernia is present.   Musculoskeletal:         General: No deformity.      Cervical back: Normal range of motion.   Lymphadenopathy:      Cervical: No cervical adenopathy.      Upper Body:      Right upper body: No supraclavicular adenopathy.      Left upper body: No supraclavicular adenopathy.   Skin:     General: Skin is warm and dry.      Coloration: Skin is not pale.      Findings: No erythema or rash.   Neurological:      Mental Status: He is alert and oriented to person, place, and time.      Motor: No abnormal muscle tone.   Psychiatric:         Behavior: Behavior normal.         Thought Content: Thought content normal.       Past Medical History:   Diagnosis Date    Arthritis     Chest pain     CHF (congestive heart failure)     COPD (chronic obstructive  pulmonary disease)     Coronary artery disease     Diabetes mellitus     Diverticulitis     Elevated cholesterol     GERD (gastroesophageal reflux disease)     [place] (hard of hearing)     Hypertension     MI (myocardial infarction)     cardiac dr told him he had had a mild attack    Neuropathy     LOWER LEGS/FEET    Urinary tract infection        Family History   Problem Relation Age of Onset    No Known Problems Father     Cancer Mother        Social History     Tobacco Use    Smoking status: Every Day     Current packs/day: 1.50     Average packs/day: 1.5 packs/day for 40.8 years (61.1 ttl pk-yrs)     Types: Cigarettes     Start date: 1984     Passive exposure: Current    Smokeless tobacco: Former     Types: Chew   Vaping Use    Vaping status: Never Used   Substance Use Topics    Alcohol use: Not Currently     Alcohol/week: 2.0 standard drinks of alcohol     Types: 2 Shots of liquor per week     Comment: 2 mixed drinks a night    Drug use: Never       Past Surgical History:   Procedure Laterality Date    APPENDECTOMY N/A 08/02/2024    Procedure: APPENDECTOMY;  Surgeon: Boogie Hays MD;  Location: [facility];  Service: General;  Laterality: N/A;    BACK SURGERY      CARDIAC CATHETERIZATION      CARDIAC SURGERY      COLON RESECTION N/A 08/02/2024    Procedure: COLON RESECTION SIGMOID;  Surgeon: Boogie Hays MD;  Location: [facility] OR;  Service: General;  Laterality: N/A;    COLONOSCOPY      COLONOSCOPY N/A 07/18/2024    Procedure: COLONOSCOPY;  Surgeon: Boogie Hays MD;  Location: [facility] OR;  Service: Gastroenterology;  Laterality: N/A;    COLOSTOMY N/A 08/02/2024    Procedure: COLOSTOMY LOOP;  Surgeon: Boogie Hays MD;  Location: [facility] OR;  Service: General;  Laterality: N/A;    CORONARY ANGIOPLASTY WITH STENT PLACEMENT      3 STENTS TOTAL    CYSTOSCOPY Bilateral 9/9/2024    Procedure: CYSTOSCOPY stent removal bilaterally;  Surgeon: Gibran Salazar MD;  Location: [facility] OR;  Service: Urology;   Laterality: Bilateral;    CYSTOSCOPY W/ URETERAL STENT PLACEMENT Bilateral 08/02/2024    Procedure: URETEROSCOPY URETERAL CATHETER/STENT INSERTION;  Surgeon: Boogie Hays MD;  Location: [facility];  Service: General;  Laterality: Bilateral;    EYE SURGERY Right     TOE AMPUTATION Left     great toe in  accident    VASCULAR SURGERY         Current Outpatient Medications   Medication Instructions    acetaminophen (TYLENOL) 500 mg, Oral, Every 6 Hours PRN    albuterol sulfate  (90 Base) MCG/ACT inhaler 1 puff, Inhalation, Every 6 Hours PRN    amLODIPine (NORVASC) 10 mg, Oral, Daily    aspirin 81 mg, Oral, Daily    hydrALAZINE (APRESOLINE) 50 mg, Oral, 3 Times Daily    metoprolol tartrate (LOPRESSOR) 25 mg, Oral, 2 Times Daily    nitroglycerin (NITROSTAT) 0.4 mg, Sublingual, Every 5 Minutes PRN, Take no more than 3 doses in 15 minutes.    oxyCODONE-acetaminophen (PERCOCET) 5-325 MG per tablet 1 tablet, Oral, Every 6 Hours PRN    Ozempic, 0.25 or 0.5 MG/DOSE, 2 MG/3ML solution pen-injector INJECT 0.25 MG  SUBCUTANEOUSLY ONCE A WEEK    sertraline (ZOLOFT) 100 mg, Oral, Daily         Assessment & Plan   Diagnoses and all orders for this visit:    1. Status post colon resection (Primary)    Close colostomy  Recommend stop smoking           This document has been electronically signed by Boogie Hays MD   October 7, 2024 11:13 EDT

## 2024-10-07 NOTE — PROGRESS NOTES
Subjective   Celestine Arriaga is a 58 y.o. male.     Chief Complaint: post colectomy for fistula    History of Present Illness He is a obese 57 yo who had a sigmoid resection for colovesical fistula. He had diverting colostomy and the follow up barium enema showed no leak last month. He had a recent admission in [place] for chest pain but work up was ok. He does use oxygen at night.    The following portions of the patient's history were reviewed and updated as appropriate: current medications, past family history, past medical history, past social history, past surgical history and problem list.    Review of Systems   Constitutional:  Negative for activity change, appetite change, chills, fever and unexpected weight change.   HENT:  Negative for congestion, facial swelling and sore throat.    Eyes:  Negative for photophobia and visual disturbance.   Respiratory:  Negative for chest tightness, shortness of breath and wheezing.    Cardiovascular:  Negative for chest pain, palpitations and leg swelling.   Gastrointestinal:  Positive for abdominal pain. Negative for abdominal distention, anal bleeding, blood in stool, constipation, diarrhea, nausea, rectal pain and vomiting.   Endocrine: Negative for cold intolerance, heat intolerance, polydipsia and polyuria.   Genitourinary:  Negative for difficulty urinating, dysuria, flank pain and urgency.   Musculoskeletal:  Negative for back pain and myalgias.   Skin:  Negative for rash and wound.   Allergic/Immunologic: Negative for immunocompromised state.   Neurological:  Negative for dizziness, seizures, syncope, light-headedness, numbness and headaches.   Hematological:  Negative for adenopathy. Does not bruise/bleed easily.   Psychiatric/Behavioral:  Negative for behavioral problems and confusion. The patient is not nervous/anxious.        Objective   Physical Exam  Vitals reviewed.   Constitutional:       General: He is not in acute distress.     Appearance: He is  well-developed. He is not ill-appearing.   HENT:      Head: Normocephalic. No laceration. Hair is normal.      Right Ear: Hearing and ear canal normal.      Left Ear: Hearing and ear canal normal.      Nose: Nose normal.      Right Sinus: No maxillary sinus tenderness or frontal sinus tenderness.      Left Sinus: No maxillary sinus tenderness or frontal sinus tenderness.   Eyes:      General: Lids are normal.      Conjunctiva/sclera: Conjunctivae normal.      Pupils: Pupils are equal, round, and reactive to light.   Neck:      Thyroid: No thyroid mass or thyromegaly.      Vascular: No JVD.      Trachea: No tracheal tenderness or tracheal deviation.   Cardiovascular:      Rate and Rhythm: Normal rate and regular rhythm.      Heart sounds: No murmur heard.     No gallop.   Pulmonary:      Effort: Pulmonary effort is normal.      Breath sounds: Normal breath sounds. No stridor. No wheezing.   Chest:      Chest wall: No tenderness.   Abdominal:      General: Bowel sounds are normal. There is no distension.      Palpations: Abdomen is soft. There is no mass.      Tenderness: There is no abdominal tenderness. There is no guarding or rebound.      Hernia: No hernia is present.   Musculoskeletal:         General: No deformity.      Cervical back: Normal range of motion.   Lymphadenopathy:      Cervical: No cervical adenopathy.      Upper Body:      Right upper body: No supraclavicular adenopathy.      Left upper body: No supraclavicular adenopathy.   Skin:     General: Skin is warm and dry.      Coloration: Skin is not pale.      Findings: No erythema or rash.   Neurological:      Mental Status: He is alert and oriented to person, place, and time.      Motor: No abnormal muscle tone.   Psychiatric:         Behavior: Behavior normal.         Thought Content: Thought content normal.       Past Medical History:   Diagnosis Date    Arthritis     Chest pain     CHF (congestive heart failure)     COPD (chronic obstructive  pulmonary disease)     Coronary artery disease     Diabetes mellitus     Diverticulitis     Elevated cholesterol     GERD (gastroesophageal reflux disease)     [place] (hard of hearing)     Hypertension     MI (myocardial infarction)     cardiac dr told him he had had a mild attack    Neuropathy     LOWER LEGS/FEET    Urinary tract infection        Family History   Problem Relation Age of Onset    No Known Problems Father     Cancer Mother        Social History     Tobacco Use    Smoking status: Every Day     Current packs/day: 1.50     Average packs/day: 1.5 packs/day for 40.8 years (61.1 ttl pk-yrs)     Types: Cigarettes     Start date: 1984     Passive exposure: Current    Smokeless tobacco: Former     Types: Chew   Vaping Use    Vaping status: Never Used   Substance Use Topics    Alcohol use: Not Currently     Alcohol/week: 2.0 standard drinks of alcohol     Types: 2 Shots of liquor per week     Comment: 2 mixed drinks a night    Drug use: Never       Past Surgical History:   Procedure Laterality Date    APPENDECTOMY N/A 08/02/2024    Procedure: APPENDECTOMY;  Surgeon: Boogie Hays MD;  Location: [facility];  Service: General;  Laterality: N/A;    BACK SURGERY      CARDIAC CATHETERIZATION      CARDIAC SURGERY      COLON RESECTION N/A 08/02/2024    Procedure: COLON RESECTION SIGMOID;  Surgeon: Boogie Hays MD;  Location: [facility] OR;  Service: General;  Laterality: N/A;    COLONOSCOPY      COLONOSCOPY N/A 07/18/2024    Procedure: COLONOSCOPY;  Surgeon: Boogie Hays MD;  Location: [facility] OR;  Service: Gastroenterology;  Laterality: N/A;    COLOSTOMY N/A 08/02/2024    Procedure: COLOSTOMY LOOP;  Surgeon: Boogie Hays MD;  Location: [facility] OR;  Service: General;  Laterality: N/A;    CORONARY ANGIOPLASTY WITH STENT PLACEMENT      3 STENTS TOTAL    CYSTOSCOPY Bilateral 9/9/2024    Procedure: CYSTOSCOPY stent removal bilaterally;  Surgeon: Gibran Salazar MD;  Location: [facility] OR;  Service: Urology;   Laterality: Bilateral;    CYSTOSCOPY W/ URETERAL STENT PLACEMENT Bilateral 08/02/2024    Procedure: URETEROSCOPY URETERAL CATHETER/STENT INSERTION;  Surgeon: Boogie Hays MD;  Location: [facility];  Service: General;  Laterality: Bilateral;    EYE SURGERY Right     TOE AMPUTATION Left     great toe in  accident    VASCULAR SURGERY         Current Outpatient Medications   Medication Instructions    acetaminophen (TYLENOL) 500 mg, Oral, Every 6 Hours PRN    albuterol sulfate  (90 Base) MCG/ACT inhaler 1 puff, Inhalation, Every 6 Hours PRN    amLODIPine (NORVASC) 10 mg, Oral, Daily    aspirin 81 mg, Oral, Daily    hydrALAZINE (APRESOLINE) 50 mg, Oral, 3 Times Daily    metoprolol tartrate (LOPRESSOR) 25 mg, Oral, 2 Times Daily    nitroglycerin (NITROSTAT) 0.4 mg, Sublingual, Every 5 Minutes PRN, Take no more than 3 doses in 15 minutes.    oxyCODONE-acetaminophen (PERCOCET) 5-325 MG per tablet 1 tablet, Oral, Every 6 Hours PRN    Ozempic, 0.25 or 0.5 MG/DOSE, 2 MG/3ML solution pen-injector INJECT 0.25 MG  SUBCUTANEOUSLY ONCE A WEEK    sertraline (ZOLOFT) 100 mg, Oral, Daily         Assessment & Plan   Diagnoses and all orders for this visit:    1. Status post colon resection (Primary)    Close colostomy  Recommend stop smoking           This document has been electronically signed by Boogie Hays MD   October 7, 2024 11:13 EDT

## 2024-10-15 NOTE — DISCHARGE INSTRUCTIONS
Please discontinue all blood thinners and anticoagulants (except aspirin) prior to surgery as per your surgeon and cardiologist instructions.  Aspirin may be continued up to the day prior to surgery.    HOLD all diabetic medications the morning of surgery as order by physician.    Please follow instructions on use of prep cloths provided by nurse. Return instruction sheet to pre-op nurse on day of surgery.    Arrival time for surgery on 10/18/24  will be given to you by 's   Office.    A RESPONSIBLE PERSON MUST REMAIN IN THE WAITING ROOM DURING YOUR PROCEDURE AND A RESPONSIBLE  MUST BE AVAILABLE UPON YOUR DISCHARGE.    General Instructions:  Do NOT eat or drink after midnight 10/17/24 which includes water, mints, or gum.  You may brush your teeth. Dental appliances that are removable must be taken out day of surgery.  Do NOT smoke, chew tobacco, or drink alcohol within 24 hours prior to surgery.  Bring medications in original bottles, any inhalers and if applicable your C-PAP/BI-PAP machine  Bring any papers given to you in the doctor’s office  Wear clean, comfortable clothes and socks  Do NOT wear contact lenses or make-up or dark nail polish.  Bring a case for your glasses if applicable.  Bring crutches or walker if applicable  Leave all other valuables and jewelry at home  If you were given a blood bank armband, continue to wear it until discharged.    Preventing a Surgical Site Infection:  Shower the night before surgery using the anti-bacterial soap that is provided by PAT nurse and clean washcloth. Also do shower morning of surgery also using provided soap. Dry with a clean towel and dress in clean clothing.  For 2 to 3 days before surgery, avoid shaving with a razor near where you will have surgery because the razor can irritate skin and make it easier to develop an infection.  Ask your surgeon if you will be receiving antibiotics prior to surgery.  Make sure you, your family, and all  healthcare providers clean their hands with soap and water or an alcohol-based hand  before caring for you or your wound.  If at all possible, quit smoking as many days before surgery as you can.    Day of Surgery:  Upon arrival, a pre-op nurse and anesthesiologist will review your health history, obtain vital signs, and answer questions you may have.  The only belongings needed at this time will be your home medications and if applicable you C-PAP/BI-PAP machine.  If you are staying overnight, your family can leave the rest of your belongings in the car and bring them to your room later.  A pre-op nurse will start an IV and you may receive medication in preparation for surgery.  Due to patient privacy and limited space, only one member of your family will be able to accompany you in the pre-op area.  While you are in surgery your family should notify the waiting room  if they leave the waiting room area and provide a contact number.  Please be aware that surgery does come with discomfort.  We want to make every effort to control your discomfort so please discuss any uncontrolled symptoms with your nurse.  Your doctor will most likely have prescribed pain medications.  If you are going home after surgery you will receive individualized written care instructions before being discharged.  A responsible adult must drive you to and from the hospital on the day of surgery and stay with you for 24 hours.  If you are staying overnight following surgery, you will be transported to your hospital room following the recovery period.

## 2024-10-16 ENCOUNTER — PRE-ADMISSION TESTING (OUTPATIENT)
Dept: PREADMISSION TESTING | Facility: HOSPITAL | Age: 59
End: 2024-10-16
Payer: COMMERCIAL

## 2024-10-16 LAB
ABO GROUP BLD: NORMAL
ANION GAP SERPL CALCULATED.3IONS-SCNC: 12.3 MMOL/L (ref 5–15)
BLD GP AB SCN SERPL QL: NEGATIVE
BUN SERPL-MCNC: 13 MG/DL (ref 6–20)
BUN/CREAT SERPL: 16 (ref 7–25)
CALCIUM SPEC-SCNC: 9.8 MG/DL (ref 8.6–10.5)
CHLORIDE SERPL-SCNC: 103 MMOL/L (ref 98–107)
CO2 SERPL-SCNC: 21.7 MMOL/L (ref 22–29)
CREAT SERPL-MCNC: 0.81 MG/DL (ref 0.76–1.27)
DEPRECATED RDW RBC AUTO: 63.7 FL (ref 37–54)
EGFRCR SERPLBLD CKD-EPI 2021: 103.5 ML/MIN/1.73
ERYTHROCYTE [DISTWIDTH] IN BLOOD BY AUTOMATED COUNT: 17.1 % (ref 12.3–15.4)
GLUCOSE SERPL-MCNC: 103 MG/DL (ref 65–99)
HCT VFR BLD AUTO: 45.5 % (ref 37.5–51)
HGB BLD-MCNC: 14.4 G/DL (ref 13–17.7)
MCH RBC QN AUTO: 31.6 PG (ref 26.6–33)
MCHC RBC AUTO-ENTMCNC: 31.6 G/DL (ref 31.5–35.7)
MCV RBC AUTO: 99.8 FL (ref 79–97)
PLATELET # BLD AUTO: 315 10*3/MM3 (ref 140–450)
PMV BLD AUTO: 9.6 FL (ref 6–12)
POTASSIUM SERPL-SCNC: 3.9 MMOL/L (ref 3.5–5.2)
RBC # BLD AUTO: 4.56 10*6/MM3 (ref 4.14–5.8)
RH BLD: POSITIVE
SODIUM SERPL-SCNC: 137 MMOL/L (ref 136–145)
T&S EXPIRATION DATE: NORMAL
WBC NRBC COR # BLD AUTO: 12.06 10*3/MM3 (ref 3.4–10.8)

## 2024-10-16 PROCEDURE — 80048 BASIC METABOLIC PNL TOTAL CA: CPT

## 2024-10-16 PROCEDURE — 85027 COMPLETE CBC AUTOMATED: CPT

## 2024-10-16 PROCEDURE — 86850 RBC ANTIBODY SCREEN: CPT | Performed by: SURGERY

## 2024-10-16 PROCEDURE — 36415 COLL VENOUS BLD VENIPUNCTURE: CPT

## 2024-10-16 PROCEDURE — 86900 BLOOD TYPING SEROLOGIC ABO: CPT | Performed by: SURGERY

## 2024-10-16 PROCEDURE — 86901 BLOOD TYPING SEROLOGIC RH(D): CPT | Performed by: SURGERY

## 2024-10-18 ENCOUNTER — HOSPITAL ENCOUNTER (INPATIENT)
Facility: HOSPITAL | Age: 59
LOS: 3 days | Discharge: HOME | DRG: 331 | End: 2024-10-21
Attending: SURGERY | Admitting: SURGERY
Payer: COMMERCIAL

## 2024-10-18 ENCOUNTER — ANESTHESIA (OUTPATIENT)
Dept: PERIOP | Facility: HOSPITAL | Age: 59
End: 2024-10-18
Payer: COMMERCIAL

## 2024-10-18 ENCOUNTER — ANESTHESIA EVENT (OUTPATIENT)
Dept: PERIOP | Facility: HOSPITAL | Age: 59
End: 2024-10-18
Payer: COMMERCIAL

## 2024-10-18 DIAGNOSIS — Z98.890 S/P COLOSTOMY TAKEDOWN: Primary | ICD-10-CM

## 2024-10-18 DIAGNOSIS — Z90.49 STATUS POST COLON RESECTION: ICD-10-CM

## 2024-10-18 LAB
ABO GROUP BLD: NORMAL
GLUCOSE BLDC GLUCOMTR-MCNC: 149 MG/DL (ref 70–130)
RH BLD: POSITIVE

## 2024-10-18 PROCEDURE — 25010000002 LIDOCAINE PF 2% 2 % SOLUTION: Performed by: NURSE ANESTHETIST, CERTIFIED REGISTERED

## 2024-10-18 PROCEDURE — 86900 BLOOD TYPING SEROLOGIC ABO: CPT

## 2024-10-18 PROCEDURE — 99222 1ST HOSP IP/OBS MODERATE 55: CPT | Performed by: INTERNAL MEDICINE

## 2024-10-18 PROCEDURE — 25010000002 CEFOXITIN PER 1 G: Performed by: SURGERY

## 2024-10-18 PROCEDURE — 25010000002 PROPOFOL 200 MG/20ML EMULSION: Performed by: NURSE ANESTHETIST, CERTIFIED REGISTERED

## 2024-10-18 PROCEDURE — 82948 REAGENT STRIP/BLOOD GLUCOSE: CPT

## 2024-10-18 PROCEDURE — 0DQE0ZZ REPAIR LARGE INTESTINE, OPEN APPROACH: ICD-10-PCS | Performed by: SURGERY

## 2024-10-18 PROCEDURE — 94799 UNLISTED PULMONARY SVC/PX: CPT

## 2024-10-18 PROCEDURE — 25010000002 BUPIVACAINE 0.5 % SOLUTION: Performed by: SURGERY

## 2024-10-18 PROCEDURE — 86901 BLOOD TYPING SEROLOGIC RH(D): CPT

## 2024-10-18 PROCEDURE — 25010000002 FENTANYL CITRATE (PF) 50 MCG/ML SOLUTION: Performed by: NURSE ANESTHETIST, CERTIFIED REGISTERED

## 2024-10-18 PROCEDURE — 25010000002 NEOSTIGMINE 10 MG/10ML SOLUTION: Performed by: NURSE ANESTHETIST, CERTIFIED REGISTERED

## 2024-10-18 PROCEDURE — 25010000002 GLYCOPYRROLATE 0.4 MG/2ML SOLUTION: Performed by: NURSE ANESTHETIST, CERTIFIED REGISTERED

## 2024-10-18 PROCEDURE — 25010000002 MIDAZOLAM PER 1 MG: Performed by: NURSE ANESTHETIST, CERTIFIED REGISTERED

## 2024-10-18 PROCEDURE — 94640 AIRWAY INHALATION TREATMENT: CPT

## 2024-10-18 PROCEDURE — 0 BUPIVACAINE LIPOSOME 1.3 % SUSPENSION: Performed by: SURGERY

## 2024-10-18 PROCEDURE — C9290 INJ, BUPIVACAINE LIPOSOME: HCPCS | Performed by: SURGERY

## 2024-10-18 PROCEDURE — 44620 REPAIR BOWEL OPENING: CPT | Performed by: SURGERY

## 2024-10-18 PROCEDURE — 25010000002 ONDANSETRON PER 1 MG: Performed by: NURSE ANESTHETIST, CERTIFIED REGISTERED

## 2024-10-18 PROCEDURE — 25810000003 LACTATED RINGERS PER 1000 ML: Performed by: ANESTHESIOLOGY

## 2024-10-18 PROCEDURE — 25010000002 HYDROMORPHONE PER 4 MG: Performed by: SURGERY

## 2024-10-18 RX ORDER — AMLODIPINE BESYLATE 10 MG/1
10 TABLET ORAL DAILY
Status: DISCONTINUED | OUTPATIENT
Start: 2024-10-18 | End: 2024-10-21 | Stop reason: HOSPADM

## 2024-10-18 RX ORDER — CLOPIDOGREL BISULFATE 75 MG/1
75 TABLET ORAL DAILY
COMMUNITY

## 2024-10-18 RX ORDER — ISOSORBIDE MONONITRATE 30 MG/1
30 TABLET, EXTENDED RELEASE ORAL DAILY
COMMUNITY

## 2024-10-18 RX ORDER — HYDROMORPHONE HYDROCHLORIDE 1 MG/ML
0.5 INJECTION, SOLUTION INTRAMUSCULAR; INTRAVENOUS; SUBCUTANEOUS
Status: DISCONTINUED | OUTPATIENT
Start: 2024-10-18 | End: 2024-10-20

## 2024-10-18 RX ORDER — LIDOCAINE HYDROCHLORIDE 20 MG/ML
INJECTION, SOLUTION EPIDURAL; INFILTRATION; INTRACAUDAL; PERINEURAL AS NEEDED
Status: DISCONTINUED | OUTPATIENT
Start: 2024-10-18 | End: 2024-10-18 | Stop reason: SURG

## 2024-10-18 RX ORDER — BISACODYL 5 MG/1
5 TABLET, DELAYED RELEASE ORAL DAILY PRN
Status: DISCONTINUED | OUTPATIENT
Start: 2024-10-18 | End: 2024-10-21 | Stop reason: HOSPADM

## 2024-10-18 RX ORDER — BUPIVACAINE HYDROCHLORIDE 5 MG/ML
INJECTION, SOLUTION PERINEURAL AS NEEDED
Status: DISCONTINUED | OUTPATIENT
Start: 2024-10-18 | End: 2024-10-18 | Stop reason: HOSPADM

## 2024-10-18 RX ORDER — BISACODYL 10 MG
10 SUPPOSITORY, RECTAL RECTAL DAILY PRN
Status: DISCONTINUED | OUTPATIENT
Start: 2024-10-18 | End: 2024-10-21 | Stop reason: HOSPADM

## 2024-10-18 RX ORDER — POLYETHYLENE GLYCOL 3350 17 G/17G
17 POWDER, FOR SOLUTION ORAL DAILY PRN
Status: DISCONTINUED | OUTPATIENT
Start: 2024-10-18 | End: 2024-10-21 | Stop reason: HOSPADM

## 2024-10-18 RX ORDER — SODIUM CHLORIDE 0.9 % (FLUSH) 0.9 %
10 SYRINGE (ML) INJECTION AS NEEDED
Status: DISCONTINUED | OUTPATIENT
Start: 2024-10-18 | End: 2024-10-18 | Stop reason: HOSPADM

## 2024-10-18 RX ORDER — NITROGLYCERIN 0.4 MG/1
0.4 TABLET SUBLINGUAL
Status: DISCONTINUED | OUTPATIENT
Start: 2024-10-18 | End: 2024-10-21 | Stop reason: HOSPADM

## 2024-10-18 RX ORDER — ROCURONIUM BROMIDE 10 MG/ML
INJECTION, SOLUTION INTRAVENOUS AS NEEDED
Status: DISCONTINUED | OUTPATIENT
Start: 2024-10-18 | End: 2024-10-18 | Stop reason: SURG

## 2024-10-18 RX ORDER — ALVIMOPAN 12 MG/1
12 CAPSULE ORAL ONCE
Status: COMPLETED | OUTPATIENT
Start: 2024-10-18 | End: 2024-10-18

## 2024-10-18 RX ORDER — METOPROLOL TARTRATE 25 MG/1
25 TABLET, FILM COATED ORAL 2 TIMES DAILY
Status: DISCONTINUED | OUTPATIENT
Start: 2024-10-18 | End: 2024-10-21 | Stop reason: HOSPADM

## 2024-10-18 RX ORDER — SODIUM CHLORIDE, SODIUM LACTATE, POTASSIUM CHLORIDE, CALCIUM CHLORIDE 600; 310; 30; 20 MG/100ML; MG/100ML; MG/100ML; MG/100ML
125 INJECTION, SOLUTION INTRAVENOUS ONCE
Status: COMPLETED | OUTPATIENT
Start: 2024-10-18 | End: 2024-10-18

## 2024-10-18 RX ORDER — ASPIRIN 81 MG/1
81 TABLET ORAL DAILY
Status: DISCONTINUED | OUTPATIENT
Start: 2024-10-18 | End: 2024-10-21 | Stop reason: HOSPADM

## 2024-10-18 RX ORDER — MIDAZOLAM HYDROCHLORIDE 1 MG/ML
1 INJECTION INTRAMUSCULAR; INTRAVENOUS
Status: DISCONTINUED | OUTPATIENT
Start: 2024-10-18 | End: 2024-10-18 | Stop reason: HOSPADM

## 2024-10-18 RX ORDER — IPRATROPIUM BROMIDE AND ALBUTEROL SULFATE 2.5; .5 MG/3ML; MG/3ML
3 SOLUTION RESPIRATORY (INHALATION) ONCE AS NEEDED
Status: DISCONTINUED | OUTPATIENT
Start: 2024-10-18 | End: 2024-10-18 | Stop reason: HOSPADM

## 2024-10-18 RX ORDER — GLYCOPYRROLATE 0.2 MG/ML
INJECTION INTRAMUSCULAR; INTRAVENOUS AS NEEDED
Status: DISCONTINUED | OUTPATIENT
Start: 2024-10-18 | End: 2024-10-18 | Stop reason: SURG

## 2024-10-18 RX ORDER — FLUTICASONE PROPIONATE 50 UG/1
1 SPRAY, METERED NASAL DAILY
COMMUNITY

## 2024-10-18 RX ORDER — FAMOTIDINE 10 MG/ML
INJECTION, SOLUTION INTRAVENOUS AS NEEDED
Status: DISCONTINUED | OUTPATIENT
Start: 2024-10-18 | End: 2024-10-18 | Stop reason: SURG

## 2024-10-18 RX ORDER — ISOSORBIDE MONONITRATE 30 MG/1
30 TABLET, EXTENDED RELEASE ORAL DAILY
Status: CANCELLED | OUTPATIENT
Start: 2024-10-18

## 2024-10-18 RX ORDER — NEOSTIGMINE METHYLSULFATE 1 MG/ML
INJECTION INTRAVENOUS AS NEEDED
Status: DISCONTINUED | OUTPATIENT
Start: 2024-10-18 | End: 2024-10-18 | Stop reason: SURG

## 2024-10-18 RX ORDER — ONDANSETRON 2 MG/ML
INJECTION INTRAMUSCULAR; INTRAVENOUS AS NEEDED
Status: DISCONTINUED | OUTPATIENT
Start: 2024-10-18 | End: 2024-10-18 | Stop reason: SURG

## 2024-10-18 RX ORDER — KETOROLAC TROMETHAMINE 30 MG/ML
30 INJECTION, SOLUTION INTRAMUSCULAR; INTRAVENOUS EVERY 6 HOURS PRN
Status: DISCONTINUED | OUTPATIENT
Start: 2024-10-18 | End: 2024-10-18 | Stop reason: HOSPADM

## 2024-10-18 RX ORDER — ALBUTEROL SULFATE 0.83 MG/ML
2.5 SOLUTION RESPIRATORY (INHALATION) EVERY 6 HOURS PRN
Status: DISCONTINUED | OUTPATIENT
Start: 2024-10-18 | End: 2024-10-21 | Stop reason: HOSPADM

## 2024-10-18 RX ORDER — AMOXICILLIN 250 MG
2 CAPSULE ORAL 2 TIMES DAILY
Status: DISCONTINUED | OUTPATIENT
Start: 2024-10-18 | End: 2024-10-21 | Stop reason: HOSPADM

## 2024-10-18 RX ORDER — SODIUM CHLORIDE 0.9 % (FLUSH) 0.9 %
10 SYRINGE (ML) INJECTION EVERY 12 HOURS SCHEDULED
Status: DISCONTINUED | OUTPATIENT
Start: 2024-10-18 | End: 2024-10-18 | Stop reason: HOSPADM

## 2024-10-18 RX ORDER — SODIUM CHLORIDE 0.9 % (FLUSH) 0.9 %
10 SYRINGE (ML) INJECTION EVERY 12 HOURS SCHEDULED
Status: DISCONTINUED | OUTPATIENT
Start: 2024-10-18 | End: 2024-10-21 | Stop reason: HOSPADM

## 2024-10-18 RX ORDER — SODIUM CHLORIDE, SODIUM LACTATE, POTASSIUM CHLORIDE, CALCIUM CHLORIDE 600; 310; 30; 20 MG/100ML; MG/100ML; MG/100ML; MG/100ML
100 INJECTION, SOLUTION INTRAVENOUS ONCE AS NEEDED
Status: DISCONTINUED | OUTPATIENT
Start: 2024-10-18 | End: 2024-10-18 | Stop reason: HOSPADM

## 2024-10-18 RX ORDER — ATORVASTATIN CALCIUM 40 MG/1
40 TABLET, FILM COATED ORAL DAILY
COMMUNITY

## 2024-10-18 RX ORDER — PROPOFOL 10 MG/ML
INJECTION, EMULSION INTRAVENOUS AS NEEDED
Status: DISCONTINUED | OUTPATIENT
Start: 2024-10-18 | End: 2024-10-18 | Stop reason: SURG

## 2024-10-18 RX ORDER — HYDROCODONE BITARTRATE AND ACETAMINOPHEN 5; 325 MG/1; MG/1
1 TABLET ORAL EVERY 4 HOURS PRN
Status: DISCONTINUED | OUTPATIENT
Start: 2024-10-18 | End: 2024-10-21 | Stop reason: HOSPADM

## 2024-10-18 RX ORDER — OXYCODONE AND ACETAMINOPHEN 5; 325 MG/1; MG/1
1 TABLET ORAL ONCE AS NEEDED
Status: DISCONTINUED | OUTPATIENT
Start: 2024-10-18 | End: 2024-10-18 | Stop reason: HOSPADM

## 2024-10-18 RX ORDER — ONDANSETRON 2 MG/ML
4 INJECTION INTRAMUSCULAR; INTRAVENOUS AS NEEDED
Status: DISCONTINUED | OUTPATIENT
Start: 2024-10-18 | End: 2024-10-18 | Stop reason: HOSPADM

## 2024-10-18 RX ORDER — FENTANYL CITRATE 50 UG/ML
INJECTION, SOLUTION INTRAMUSCULAR; INTRAVENOUS AS NEEDED
Status: DISCONTINUED | OUTPATIENT
Start: 2024-10-18 | End: 2024-10-18 | Stop reason: SURG

## 2024-10-18 RX ORDER — SODIUM CHLORIDE 9 MG/ML
40 INJECTION, SOLUTION INTRAVENOUS AS NEEDED
Status: ACTIVE | OUTPATIENT
Start: 2024-10-18 | End: 2024-10-19

## 2024-10-18 RX ORDER — MEPERIDINE HYDROCHLORIDE 25 MG/ML
12.5 INJECTION INTRAMUSCULAR; INTRAVENOUS; SUBCUTANEOUS
Status: DISCONTINUED | OUTPATIENT
Start: 2024-10-18 | End: 2024-10-18 | Stop reason: HOSPADM

## 2024-10-18 RX ORDER — FLUTICASONE PROPIONATE 50 UG/1
1 SPRAY, METERED NASAL DAILY
Status: CANCELLED | OUTPATIENT
Start: 2024-10-18

## 2024-10-18 RX ORDER — SODIUM CHLORIDE 0.9 % (FLUSH) 0.9 %
10 SYRINGE (ML) INJECTION AS NEEDED
Status: DISCONTINUED | OUTPATIENT
Start: 2024-10-18 | End: 2024-10-21 | Stop reason: HOSPADM

## 2024-10-18 RX ORDER — FENTANYL CITRATE 50 UG/ML
50 INJECTION, SOLUTION INTRAMUSCULAR; INTRAVENOUS
Status: DISCONTINUED | OUTPATIENT
Start: 2024-10-18 | End: 2024-10-18 | Stop reason: HOSPADM

## 2024-10-18 RX ORDER — HYDRALAZINE HYDROCHLORIDE 50 MG/1
50 TABLET, FILM COATED ORAL 3 TIMES DAILY
Status: DISCONTINUED | OUTPATIENT
Start: 2024-10-18 | End: 2024-10-21 | Stop reason: HOSPADM

## 2024-10-18 RX ORDER — MIDAZOLAM HYDROCHLORIDE 1 MG/ML
INJECTION INTRAMUSCULAR; INTRAVENOUS AS NEEDED
Status: DISCONTINUED | OUTPATIENT
Start: 2024-10-18 | End: 2024-10-18 | Stop reason: SURG

## 2024-10-18 RX ORDER — LORATADINE 10 MG/1
10 TABLET ORAL DAILY
COMMUNITY

## 2024-10-18 RX ORDER — ONDANSETRON 2 MG/ML
4 INJECTION INTRAMUSCULAR; INTRAVENOUS EVERY 6 HOURS PRN
Status: DISCONTINUED | OUTPATIENT
Start: 2024-10-18 | End: 2024-10-21 | Stop reason: HOSPADM

## 2024-10-18 RX ORDER — MAGNESIUM HYDROXIDE 1200 MG/15ML
LIQUID ORAL AS NEEDED
Status: DISCONTINUED | OUTPATIENT
Start: 2024-10-18 | End: 2024-10-18 | Stop reason: HOSPADM

## 2024-10-18 RX ORDER — IPRATROPIUM BROMIDE AND ALBUTEROL SULFATE 2.5; .5 MG/3ML; MG/3ML
3 SOLUTION RESPIRATORY (INHALATION) ONCE
Status: COMPLETED | OUTPATIENT
Start: 2024-10-18 | End: 2024-10-18

## 2024-10-18 RX ADMIN — Medication 10 ML: at 21:09

## 2024-10-18 RX ADMIN — HYDROCODONE BITARTRATE AND ACETAMINOPHEN 1 TABLET: 5; 325 TABLET ORAL at 21:13

## 2024-10-18 RX ADMIN — FENTANYL CITRATE 50 MCG: 50 INJECTION INTRAMUSCULAR; INTRAVENOUS at 10:10

## 2024-10-18 RX ADMIN — SODIUM CHLORIDE, POTASSIUM CHLORIDE, SODIUM LACTATE AND CALCIUM CHLORIDE: 600; 310; 30; 20 INJECTION, SOLUTION INTRAVENOUS at 10:03

## 2024-10-18 RX ADMIN — PROPOFOL 200 MG: 10 INJECTION, EMULSION INTRAVENOUS at 10:07

## 2024-10-18 RX ADMIN — ALVIMOPAN 12 MG: 12 CAPSULE ORAL at 09:34

## 2024-10-18 RX ADMIN — FENTANYL CITRATE 50 MCG: 50 INJECTION INTRAMUSCULAR; INTRAVENOUS at 10:07

## 2024-10-18 RX ADMIN — HYDROCODONE BITARTRATE AND ACETAMINOPHEN 1 TABLET: 5; 325 TABLET ORAL at 12:23

## 2024-10-18 RX ADMIN — DESOGESTREL AND ETHINYL ESTRADIOL 3 MG: KIT at 10:46

## 2024-10-18 RX ADMIN — ASPIRIN 81 MG: 81 TABLET, COATED ORAL at 12:23

## 2024-10-18 RX ADMIN — LIDOCAINE HYDROCHLORIDE 60 MG: 20 INJECTION, SOLUTION EPIDURAL; INFILTRATION; INTRACAUDAL; PERINEURAL at 10:07

## 2024-10-18 RX ADMIN — CEFOXITIN SODIUM 1000 MG: 1 POWDER, FOR SOLUTION INTRAVENOUS at 13:30

## 2024-10-18 RX ADMIN — HYDROCODONE BITARTRATE AND ACETAMINOPHEN 1 TABLET: 5; 325 TABLET ORAL at 16:55

## 2024-10-18 RX ADMIN — SERTRALINE 100 MG: 50 TABLET, FILM COATED ORAL at 12:23

## 2024-10-18 RX ADMIN — HYDRALAZINE HYDROCHLORIDE 50 MG: 50 TABLET ORAL at 21:08

## 2024-10-18 RX ADMIN — ROCURONIUM BROMIDE 30 MG: 10 SOLUTION INTRAVENOUS at 10:07

## 2024-10-18 RX ADMIN — ONDANSETRON 4 MG: 2 INJECTION INTRAMUSCULAR; INTRAVENOUS at 10:07

## 2024-10-18 RX ADMIN — METOPROLOL TARTRATE 25 MG: 25 TABLET, FILM COATED ORAL at 21:08

## 2024-10-18 RX ADMIN — CEFOXITIN 2 G: 2 INJECTION, POWDER, FOR SOLUTION INTRAVENOUS at 10:13

## 2024-10-18 RX ADMIN — GLYCOPYRROLATE 0.4 MG: 0.4 INJECTION INTRAMUSCULAR; INTRAVENOUS at 10:46

## 2024-10-18 RX ADMIN — SENNOSIDES AND DOCUSATE SODIUM 2 TABLET: 50; 8.6 TABLET ORAL at 12:24

## 2024-10-18 RX ADMIN — HYDROMORPHONE HYDROCHLORIDE 0.5 MG: 1 INJECTION, SOLUTION INTRAMUSCULAR; INTRAVENOUS; SUBCUTANEOUS at 23:49

## 2024-10-18 RX ADMIN — Medication 10 ML: at 12:23

## 2024-10-18 RX ADMIN — IPRATROPIUM BROMIDE AND ALBUTEROL SULFATE 3 ML: .5; 2.5 SOLUTION RESPIRATORY (INHALATION) at 09:43

## 2024-10-18 RX ADMIN — AMLODIPINE BESYLATE 10 MG: 10 TABLET ORAL at 12:24

## 2024-10-18 RX ADMIN — POLYETHYLENE GLYCOL-3350 AND ELECTROLYTES 4000 ML: 236; 6.74; 5.86; 2.97; 22.74 POWDER, FOR SOLUTION ORAL at 13:43

## 2024-10-18 RX ADMIN — MIDAZOLAM HYDROCHLORIDE 2 MG: 1 INJECTION, SOLUTION INTRAMUSCULAR; INTRAVENOUS at 10:03

## 2024-10-18 RX ADMIN — CEFOXITIN SODIUM 1000 MG: 1 POWDER, FOR SOLUTION INTRAVENOUS at 23:43

## 2024-10-18 RX ADMIN — FAMOTIDINE 20 MG: 10 INJECTION, SOLUTION INTRAVENOUS at 10:03

## 2024-10-18 RX ADMIN — HYDRALAZINE HYDROCHLORIDE 50 MG: 50 TABLET ORAL at 15:23

## 2024-10-18 NOTE — OP NOTE
COLOSTOMY TAKEDOWN OR CLOSURE  Procedure Note    Celestine Arriaga  10/18/2024    Pre-op Diagnosis:   Status post colon resection [Z90.49]    Post-op Diagnosis: same        Procedure(s):  COLOSTOMY TAKEDOWN OR CLOSURE    Surgeon(s):  Boogie Hays MD    Anesthesia: General    Staff:   Circulator: Jacque Yates RN  Scrub Person: Nanda Dunn  Assistant: Boogie Zhu    Estimated Blood Loss: minimal    Specimens:                Order Name Source Comment Collection Info Order Time   ABO/RH SPECIMEN VERIFICATION PREOP   Collected By: Samreen Torres 10/18/2024  9:34 AM         Drains:   [REMOVED] Colostomy RUQ (Removed)   Stomal Appliance Other (Comment) 10/18/24 0927   Stoma Appearance rosebud appearance 10/18/24 0927   Peristomal Assessment Clean;Intact 10/18/24 0927       Procedure: The abdomen was prepped and draped. The colostomy was incised around the ostomy with cautery. The bowel was dissected out down to inside the fascia. The edges of the opening of the ostomy were trimmed off. The defect was closed with vicryl in two layers and the bowel placed in the abdomen. The fascia was closed in two layers with vicryl and local injected. The subcutaneous tissue was irrigated and closed in the center with vicryl. The ends were packed with plain guaze.     Findings: colostomy           Complications: none   Grafts / Implants N/A    Boogie Hays MD     Date: 10/18/2024  Time: 10:47 EDT

## 2024-10-18 NOTE — ANESTHESIA PROCEDURE NOTES
Airway  Urgency: elective    Date/Time: 10/18/2024 10:08 AM  Airway not difficult    General Information and Staff    Patient location during procedure: OR    Indications and Patient Condition  Indications for airway management: airway protection    Preoxygenated: yes  Mask difficulty assessment: 1 - vent by mask    Final Airway Details  Final airway type: endotracheal airway      Successful airway: ETT  Cuffed: yes   Successful intubation technique: direct laryngoscopy  Facilitating devices/methods: intubating stylet  Endotracheal tube insertion site: oral  Blade: James  Blade size: 3  ETT size (mm): 7.5  Cormack-Lehane Classification: grade IIa - partial view of glottis  Placement verified by: chest auscultation, capnometry and palpation of cuff   Measured from: lips  ETT/EBT  to lips (cm): 22  Number of attempts at approach: 1  Assessment: lips, teeth, and gum same as pre-op and atraumatic intubation

## 2024-10-18 NOTE — ANESTHESIA PREPROCEDURE EVALUATION
Anesthesia Evaluation     Patient summary reviewed and Nursing notes reviewed   no history of anesthetic complications:   NPO Solid Status: > 8 hours  NPO Liquid Status: > 8 hours           Airway   Mallampati: II  TM distance: >3 FB  Neck ROM: full  No difficulty expected  Dental    (+) poor dentition    Pulmonary    (+) a smoker Current, Abstained day of surgery, COPD moderate,rhonchi  Cardiovascular - normal exam  Exercise tolerance: good (4-7 METS)    NYHA Classification: II  ECG reviewed  Patient on routine beta blocker and Beta blocker given within 24 hours of surgery  Rhythm: regular  Rate: normal    (+) hypertension, past MI , CAD, cardiac stents (stents 11/2015 3 vessel) , CHF , hyperlipidemia      Neuro/Psych- negative ROS  GI/Hepatic/Renal/Endo    (+) obesity, morbid obesity, GERD, diabetes mellitus    Musculoskeletal     Abdominal  - normal exam    Abdomen: soft.   Substance History - negative use     OB/GYN negative ob/gyn ROS         Other   arthritis,     ROS/Med Hx Other:     Normal sinus rhythm  Anterior infarct , age undetermined  Abnormal ECG  No previous ECGs available  Confirmed by Jacob Anderson ([id number]) on 7/31/2024 6:54:58 PM                  Anesthesia Plan    ASA 3     general     (Possible TAP block.)  intravenous induction     Anesthetic plan, risks, benefits, and alternatives have been provided, discussed and informed consent has been obtained with: patient.  Pre-procedure education provided  Use of blood products discussed with  Consented to blood products.    Plan discussed with CRNA.      CODE STATUS:

## 2024-10-18 NOTE — NURSING NOTE
SSI Colon protocol followed.  Betadine prep used, bowel isolation technique used. Contaminated instruments were isolated.

## 2024-10-18 NOTE — PLAN OF CARE
Goal Outcome Evaluation:   Pt arrived from surgery A/Ox4, VSS on RA. Pt has ambulated to bathroom with SBA, tolerated well. Pt had complaint of pain, PRN med given. Abd dressing CDI. No acute changes noted at this time. Will continue to follow plan of care.

## 2024-10-18 NOTE — ANESTHESIA POSTPROCEDURE EVALUATION
Patient: Celestine Arriaga    Procedure Summary       Date: 10/18/24 Room / Location: [facility] OR 01 /  [facility] OR    Anesthesia Start: 1003 Anesthesia Stop: 1057    Procedure: COLOSTOMY TAKEDOWN OR CLOSURE (Abdomen) Diagnosis:       Status post colon resection      (Status post colon resection [Z90.49])    Surgeons: Boogie Hays MD Provider: Jorgito Campbell MD    Anesthesia Type: general ASA Status: 3            Anesthesia Type: general    Vitals  Vitals Value Taken Time   /73 10/18/24 1128   Temp 97.3 °F (36.3 °C) 10/18/24 1128   Pulse 64 10/18/24 1128   Resp 20 10/18/24 1128   SpO2 96 % 10/18/24 1128           Post Anesthesia Care and Evaluation    Patient location during evaluation: PACU  Patient participation: complete - patient participated  Level of consciousness: awake  Pain score: 0  Pain management: satisfactory to patient    Airway patency: patent  Anesthetic complications: No anesthetic complications  PONV Status: none  Cardiovascular status: hemodynamically stable  Respiratory status: nasal cannula  Hydration status: acceptable

## 2024-10-18 NOTE — CONSULTS
[facility] HOSPITALIST CONSULT NOTE     Consults    Patient Identification:  Name:  Celestine Arriaga  Age:  58 y.o.  Sex:  male  :  1965  MRN:  [id number]  Visit Number:  [id number]  Primary care provider:  Rachell Rivers APRN    Chief complaint: Mild abdominal pain    History of presenting illness: Mr. Arriaga is a 58-year-old male who presented to [facility] for elective colostomy takedown/closure.  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  Patient did have barium enema performed last month demonstrating an 8.  As such, general surgery services and the patient elected to perform colostomy takedown with closure today.  Patient is now in the postoperative window and internal medicine services have been consulted for perioperative management of multiple comorbidities.  Patient currently reports mild abdominal pain but denies any nausea, vomiting or any other symptoms at this time.  ---------------------------------------------------------------------------------------------------------------------   Past History:  Past Medical History:   Diagnosis Date    Arthritis     Chest pain     CHF (congestive heart failure)     COPD (chronic obstructive pulmonary disease)     Coronary artery disease     Diabetes mellitus     Diverticulitis     Elevated cholesterol     GERD (gastroesophageal reflux disease)     [place] (hard of hearing)     Hypertension     MI (myocardial infarction)     cardiac dr told him he had had a mild attack    Neuropathy     LOWER LEGS/FEET    Urinary tract infection      Past Surgical History:   Procedure Laterality Date    APPENDECTOMY N/A 2024    Procedure: APPENDECTOMY;  Surgeon: Boogie Hays MD;  Location: [facility];  Service: General;  Laterality: N/A;    BACK SURGERY      with instrumentation    CARDIAC CATHETERIZATION      CARDIAC SURGERY      COLON RESECTION N/A 2024    Procedure: COLON RESECTION SIGMOID;  Surgeon:  Boogie Hays MD;  [facility];  Service: General;  Laterality: N/A;    COLONOSCOPY      COLONOSCOPY N/A 07/18/2024    Procedure: COLONOSCOPY;  Surgeon: Boogie Hays MD;  [facility];  Service: Gastroenterology;  Laterality: N/A;    COLOSTOMY N/A 08/02/2024    Procedure: COLOSTOMY LOOP;  Surgeon: Boogie Hays MD;  [facility];  Service: General;  Laterality: N/A;    CORONARY ANGIOPLASTY WITH STENT PLACEMENT      3 STENTS TOTAL    CYSTOSCOPY Bilateral 09/09/2024    Procedure: CYSTOSCOPY stent removal bilaterally;  Surgeon: Gibran Salazar MD;  [facility];  Service: Urology;  Laterality: Bilateral;    CYSTOSCOPY W/ URETERAL STENT PLACEMENT Bilateral 08/02/2024    Procedure: URETEROSCOPY URETERAL CATHETER/STENT INSERTION;  Surgeon: Boogie Hays MD;  [facility];  Service: General;  Laterality: Bilateral;    EYE SURGERY Right     TOE AMPUTATION Left     great toe in  accident    VASCULAR SURGERY       Social History     Socioeconomic History    Marital status:    Tobacco Use    Smoking status: Every Day     Current packs/day: 1.50     Average packs/day: 1.5 packs/day for 40.8 years (61.2 ttl pk-yrs)     Types: Cigarettes     Start date: 1984     Passive exposure: Current    Smokeless tobacco: Former     Types: Chew   Vaping Use    Vaping status: Never Used   Substance and Sexual Activity    Alcohol use: Yes     Alcohol/week: 24.0 standard drinks of alcohol     Types: 14 Cans of beer, 10 Shots of liquor per week    Drug use: Never    Sexual activity: Defer     Birth control/protection: None     Family History   Problem Relation Age of Onset    No Known Problems Father     Cancer Mother      ---------------------------------------------------------------------------------------------------------------------   Allergies:   Lisinopril  ---------------------------------------------------------------------------------------------------------------------   Prior to Admission Medications       Prescriptions Last Dose Informant Patient Reported? Taking?    acetaminophen (TYLENOL) 500 MG tablet 10/17/2024 Self, Other Yes Yes    Take 1 tablet by mouth Every 6 (Six) Hours As Needed for Mild Pain.    albuterol sulfate  (90 Base) MCG/ACT inhaler 10/18/2024 Self, Other Yes Yes    Inhale 1 puff Every 6 (Six) Hours As Needed for Wheezing.    amLODIPine (NORVASC) 10 MG tablet 10/17/2024 Self, Other Yes Yes    Take 1 tablet by mouth Daily.    aspirin 81 MG EC tablet 10/17/2024 Self, Other Yes Yes    Take 1 tablet by mouth Daily.    atorvastatin (LIPITOR) 40 MG tablet 10/17/2024  Yes Yes    Take 1 tablet by mouth Daily.    clopidogrel (PLAVIX) 75 MG tablet 10/17/2024  Yes Yes    Take 1 tablet by mouth Daily.    erythromycin base (E-MYCIN) 500 MG tablet 10/17/2024  No Yes    Take 2 tablets by mouth at 2 pm, 3 pm, & 10 pm the day of prep.    fluticasone (FLONASE) 50 MCG/ACT nasal spray 10/17/2024  Yes Yes    Administer 1 spray into the nostril(s) as directed by provider Daily.    hydrALAZINE (APRESOLINE) 50 MG tablet 10/17/2024 Self, Other Yes Yes    Take 1 tablet by mouth 3 (Three) Times a Day.    isosorbide mononitrate (IMDUR) 30 MG 24 hr tablet 10/17/2024 Pharmacy Yes Yes    Take 1 tablet by mouth Daily.    loratadine (CLARITIN) 10 MG tablet 10/17/2024  Yes Yes    Take 1 tablet by mouth Daily.    neomycin (MYCIFRADIN) 500 MG tablet 10/17/2024  No Yes    Take 2 tablets by mouth at 2 pm, 3 pm, & 10 pm the day of prep.    sertraline (ZOLOFT) 100 MG tablet 10/17/2024 Self, Other Yes Yes    Take 1 tablet by mouth Daily.    nitroglycerin (NITROSTAT) 0.4 MG SL tablet More than a month Self, Other Yes No    Place 1 tablet under the tongue Every 5 (Five) Minutes As Needed for Chest Pain. Take no more than 3 doses in 15 minutes.    Ozempic, 0.25 or 0.5  MG/DOSE, 2 MG/3ML solution pen-injector 7/29/2024 Self, Other Yes No    INJECT 0.25 MG  SUBCUTANEOUSLY ONCE A WEEK          [facility] Meds:  amLODIPine, 10 mg, Oral, Daily  aspirin, 81 mg, Oral, Daily  ceFOXitin, 1,000 mg, Intravenous, Q12H  hydrALAZINE, 50 mg, Oral, TID  metoprolol tartrate, 25 mg, Oral, BID  senna-docusate sodium, 2 tablet, Oral, BID  sertraline, 100 mg, Oral, Daily  sodium chloride, 10 mL, Intravenous, Q12H         ---------------------------------------------------------------------------------------------------------------------   Review of Systems   On review of systems the patient denies the following unless noted above:     Constitutional:  Fevers, chills, weight change, fatigue     Eyes: Vision changes, headache, double vision, loss of vision     ENT: Runny nose, nose bleeds, ringing in ears, pain with swallowing, sore throat     Cardiovascular: Chest pains, palpitations, PND, orthopnea     Respiratory: Cough, wheezing, SOA, hemoptysis     GI:  Abdominal pain, diarrhea, constipation, change in stool caliber,    Rectal bleeding, vomiting or nausea     : Difficulty voiding, dysuria, hematuria     Musculoskeletal: Changes of any chronic joint pain, swelling     Skin: Rash, itching, easy bruisability     Neurological: Unilateral weakness, new onset numbness, speech difficulties     Psychiatric: Sadness, tearfulness, feelings of hopelessness, racing thoughts     Endocrine:  Heat or cold intolerance, mood swings, polydipsia, polyphagia,    recent hypoglycemia    Vital Signs:  Temp:  [97.3 °F (36.3 °C)-97.9 °F (36.6 °C)] 97.8 °F (36.6 °C)  Heart Rate:  [63-83] 63  Resp:  [16-20] 18  BP: (112-145)/(69-90) 123/78      10/18/24  1145   Weight: 115 kg (253 lb 11.2 oz)     Body mass index is 37.46 kg/m².  ---------------------------------------------------------------------------------------------------------------------   Physical exam:  Constitutional: Well-nourished  male in no apparent  "distress.     HENT:  Head:  Normocephalic and atraumatic.  Mouth:  Moist mucous membranes.    Eyes:  Conjunctivae and EOM are normal.  Pupils are equal, round, and reactive to light.  No scleral icterus.    Neck:  Neck supple. No thyromegaly.  No JVD present.    Cardiovascular:  Regular rate and rhythm with no murmurs, rubs, clicks or gallops appreciated.  Pulmonary/Chest:  Clear to auscultation bilaterally with no crackles, wheezes or rhonchi appreciated.  Abdominal:  Soft.  Mildly tender to palpation in all 4 quadrants. Nondistended  Bowel sounds are normal in all four quadrants. No organomegally appreciated.   Musculoskeletal:  No edema, no tenderness, and no deformity.  No red or swollen joints anywhere.    Neurological:  Alert, Cranial nerves II-XII intact with no focal defecits.  No facial droop.  No slurred speech.   Skin:  Warm and dry to palpation with no rashes or lesions appreciated.  Peripheral vascular:  2+ radial and pedal pulses in bilateral upper and lower extremities.  Psychiatric:  Alert and oriented x3, demonstrates appropriate judgement and insight.  -------------------------------------------------------------------------  ---------------------------------------------------------------------------------------------------------------------   Results from last 7 days   Lab Units 10/16/24  1512   WBC 10*3/mm3 12.06*   HEMOGLOBIN g/dL 14.4   HEMATOCRIT % 45.5   MCV fL 99.8*   MCHC g/dL 31.6   PLATELETS 10*3/mm3 315         Results from last 7 days   Lab Units 10/16/24  1512   SODIUM mmol/L 137   POTASSIUM mmol/L 3.9   CHLORIDE mmol/L 103   CO2 mmol/L 21.7*   BUN mg/dL 13   CREATININE mg/dL 0.81   CALCIUM mg/dL 9.8   GLUCOSE mg/dL 103*   Estimated Creatinine Clearance: 127.3 mL/min (by C-G formula based on SCr of 0.81 mg/dL).  No results found for: \"AMMONIA\"          Lab Results   Component Value Date    HGBA1C 5.4 11/22/2015     No results found for: \"TSH\", \"FREET4\"  No results found for: " "\"PREGTESTUR\", \"PREGSERUM\", \"HCG\", \"HCGQUANT\"  Pain Management Panel           No data to display              No results found for: \"BLOODCX\"  No results found for: \"URINECX\"  No results found for: \"WOUNDCX\"  No results found for: \"STOOLCX\"      ---------------------------------------------------------------------------------------------------------------------   Imaging Results (Last 7 Days)       ** No results found for the last 168 hours. **          ----------------------------------------------------------------------------------------------------------------------  Assessment and Plan:    COPD -patient reports using supplemental oxygen at night while asleep, currently on room air with oxygen saturation greater than 90%.  Continue to monitor closely, no current evidence of COPD exacerbation    2.  Type 2 diabetes mellitus -patient on Ozempic at home, will start sliding scale insulin with Accu-Cheks before every meal and nightly while hospitalized    3.  Hyperlipidemia -statin    4.  Essential hypertension -have restarted patient's home dose of amlodipine and hydralazine, will monitor blood pressure closely and make antihypertensive medication adjustments as necessary    5.  Colostomy takedown -will defer all surgical care to primary team.      Thank you for the consult.    Hugo David DO  10/18/24  15:37 EDT  "

## 2024-10-19 LAB
ANION GAP SERPL CALCULATED.3IONS-SCNC: 12.2 MMOL/L (ref 5–15)
BUN SERPL-MCNC: 9 MG/DL (ref 6–20)
BUN/CREAT SERPL: 11.1 (ref 7–25)
CALCIUM SPEC-SCNC: 8.8 MG/DL (ref 8.6–10.5)
CHLORIDE SERPL-SCNC: 105 MMOL/L (ref 98–107)
CO2 SERPL-SCNC: 22.8 MMOL/L (ref 22–29)
CREAT SERPL-MCNC: 0.81 MG/DL (ref 0.76–1.27)
DEPRECATED RDW RBC AUTO: 65.1 FL (ref 37–54)
EGFRCR SERPLBLD CKD-EPI 2021: 103.5 ML/MIN/1.73
ERYTHROCYTE [DISTWIDTH] IN BLOOD BY AUTOMATED COUNT: 17.2 % (ref 12.3–15.4)
GLUCOSE SERPL-MCNC: 122 MG/DL (ref 65–99)
HCT VFR BLD AUTO: 38.8 % (ref 37.5–51)
HGB BLD-MCNC: 12.4 G/DL (ref 13–17.7)
MCH RBC QN AUTO: 32.3 PG (ref 26.6–33)
MCHC RBC AUTO-ENTMCNC: 32 G/DL (ref 31.5–35.7)
MCV RBC AUTO: 101 FL (ref 79–97)
PLATELET # BLD AUTO: 246 10*3/MM3 (ref 140–450)
PMV BLD AUTO: 10.3 FL (ref 6–12)
POTASSIUM SERPL-SCNC: 3.8 MMOL/L (ref 3.5–5.2)
RBC # BLD AUTO: 3.84 10*6/MM3 (ref 4.14–5.8)
SODIUM SERPL-SCNC: 140 MMOL/L (ref 136–145)
WBC NRBC COR # BLD AUTO: 14.76 10*3/MM3 (ref 3.4–10.8)

## 2024-10-19 PROCEDURE — 99024 POSTOP FOLLOW-UP VISIT: CPT | Performed by: SURGERY

## 2024-10-19 PROCEDURE — 80048 BASIC METABOLIC PNL TOTAL CA: CPT | Performed by: SURGERY

## 2024-10-19 PROCEDURE — 94799 UNLISTED PULMONARY SVC/PX: CPT

## 2024-10-19 PROCEDURE — 85027 COMPLETE CBC AUTOMATED: CPT | Performed by: SURGERY

## 2024-10-19 PROCEDURE — 94664 DEMO&/EVAL PT USE INHALER: CPT

## 2024-10-19 PROCEDURE — 99232 SBSQ HOSP IP/OBS MODERATE 35: CPT | Performed by: INTERNAL MEDICINE

## 2024-10-19 PROCEDURE — 94761 N-INVAS EAR/PLS OXIMETRY MLT: CPT

## 2024-10-19 RX ORDER — IPRATROPIUM BROMIDE AND ALBUTEROL SULFATE 2.5; .5 MG/3ML; MG/3ML
3 SOLUTION RESPIRATORY (INHALATION)
Status: DISCONTINUED | OUTPATIENT
Start: 2024-10-19 | End: 2024-10-21 | Stop reason: HOSPADM

## 2024-10-19 RX ORDER — IPRATROPIUM BROMIDE AND ALBUTEROL SULFATE 2.5; .5 MG/3ML; MG/3ML
3 SOLUTION RESPIRATORY (INHALATION) EVERY 6 HOURS PRN
Status: DISCONTINUED | OUTPATIENT
Start: 2024-10-19 | End: 2024-10-19

## 2024-10-19 RX ORDER — ISOSORBIDE MONONITRATE 30 MG/1
30 TABLET, EXTENDED RELEASE ORAL DAILY
Status: DISCONTINUED | OUTPATIENT
Start: 2024-10-19 | End: 2024-10-21 | Stop reason: HOSPADM

## 2024-10-19 RX ORDER — ATORVASTATIN CALCIUM 40 MG/1
40 TABLET, FILM COATED ORAL DAILY
Status: DISCONTINUED | OUTPATIENT
Start: 2024-10-19 | End: 2024-10-21 | Stop reason: HOSPADM

## 2024-10-19 RX ORDER — CETIRIZINE HYDROCHLORIDE 10 MG/1
10 TABLET ORAL DAILY
Status: DISCONTINUED | OUTPATIENT
Start: 2024-10-19 | End: 2024-10-21 | Stop reason: HOSPADM

## 2024-10-19 RX ORDER — METOPROLOL TARTRATE 25 MG/1
25 TABLET, FILM COATED ORAL 2 TIMES DAILY
COMMUNITY

## 2024-10-19 RX ORDER — CLOPIDOGREL BISULFATE 75 MG/1
75 TABLET ORAL DAILY
Status: DISCONTINUED | OUTPATIENT
Start: 2024-10-19 | End: 2024-10-21 | Stop reason: HOSPADM

## 2024-10-19 RX ADMIN — IPRATROPIUM BROMIDE AND ALBUTEROL SULFATE 3 ML: .5; 2.5 SOLUTION RESPIRATORY (INHALATION) at 12:09

## 2024-10-19 RX ADMIN — HYDRALAZINE HYDROCHLORIDE 50 MG: 50 TABLET ORAL at 20:59

## 2024-10-19 RX ADMIN — METOPROLOL TARTRATE 25 MG: 25 TABLET, FILM COATED ORAL at 08:50

## 2024-10-19 RX ADMIN — ATORVASTATIN CALCIUM 40 MG: 40 TABLET, FILM COATED ORAL at 20:59

## 2024-10-19 RX ADMIN — Medication 10 ML: at 20:59

## 2024-10-19 RX ADMIN — HYDROCODONE BITARTRATE AND ACETAMINOPHEN 1 TABLET: 5; 325 TABLET ORAL at 21:03

## 2024-10-19 RX ADMIN — ALBUTEROL SULFATE 2.5 MG: 2.5 SOLUTION RESPIRATORY (INHALATION) at 05:21

## 2024-10-19 RX ADMIN — HYDROCODONE BITARTRATE AND ACETAMINOPHEN 1 TABLET: 5; 325 TABLET ORAL at 16:59

## 2024-10-19 RX ADMIN — IPRATROPIUM BROMIDE AND ALBUTEROL SULFATE 3 ML: .5; 2.5 SOLUTION RESPIRATORY (INHALATION) at 19:51

## 2024-10-19 RX ADMIN — AMLODIPINE BESYLATE 10 MG: 10 TABLET ORAL at 08:50

## 2024-10-19 RX ADMIN — SERTRALINE 100 MG: 50 TABLET, FILM COATED ORAL at 08:50

## 2024-10-19 RX ADMIN — METOPROLOL TARTRATE 25 MG: 25 TABLET, FILM COATED ORAL at 20:59

## 2024-10-19 RX ADMIN — Medication 10 ML: at 08:51

## 2024-10-19 RX ADMIN — CETIRIZINE HYDROCHLORIDE 10 MG: 10 TABLET, FILM COATED ORAL at 20:59

## 2024-10-19 RX ADMIN — ASPIRIN 81 MG: 81 TABLET, COATED ORAL at 08:50

## 2024-10-19 RX ADMIN — HYDRALAZINE HYDROCHLORIDE 50 MG: 50 TABLET ORAL at 08:50

## 2024-10-19 RX ADMIN — HYDROCODONE BITARTRATE AND ACETAMINOPHEN 1 TABLET: 5; 325 TABLET ORAL at 09:01

## 2024-10-19 RX ADMIN — SENNOSIDES AND DOCUSATE SODIUM 2 TABLET: 50; 8.6 TABLET ORAL at 20:58

## 2024-10-19 RX ADMIN — ISOSORBIDE MONONITRATE 30 MG: 30 TABLET, EXTENDED RELEASE ORAL at 20:58

## 2024-10-19 RX ADMIN — HYDROCODONE BITARTRATE AND ACETAMINOPHEN 1 TABLET: 5; 325 TABLET ORAL at 03:15

## 2024-10-19 RX ADMIN — HYDRALAZINE HYDROCHLORIDE 50 MG: 50 TABLET ORAL at 15:08

## 2024-10-19 NOTE — PROGRESS NOTES
[facility]     Patient Identification:  Name:  Celestine Arriaga  Age:  58 y.o.  Sex:  male  :  1965  MRN:  [id number]  Visit Number:  [id number]  Primary Care Provider:  Rachell Rivers APRN    Length of stay:  1    Chief complaint: Mild abdominal pain    Subjective:    Patient seen and examined at bedside with no nursing staff present.  Patient was asleep when entered the room but easily awakened.  Patient with mild abdominal pain but no new events overnight.  ----------------------------------------------------------------------------------------------------------------------  [facility] Meds:  amLODIPine, 10 mg, Oral, Daily  aspirin, 81 mg, Oral, Daily  hydrALAZINE, 50 mg, Oral, TID  ipratropium-albuterol, 3 mL, Nebulization, Q6H - RT  metoprolol tartrate, 25 mg, Oral, BID  senna-docusate sodium, 2 tablet, Oral, BID  sertraline, 100 mg, Oral, Daily  sodium chloride, 10 mL, Intravenous, Q12H         ----------------------------------------------------------------------------------------------------------------------  Vital Signs:  Temp:  [97.8 °F (36.6 °C)-99 °F (37.2 °C)] 98.3 °F (36.8 °C)  Heart Rate:  [63-83] 63  Resp:  [16-20] 20  BP: (112-148)/(63-86) 136/82      10/18/24  1145   Weight: 115 kg (253 lb 11.2 oz)     Body mass index is 37.46 kg/m².    Intake/Output Summary (Last 24 hours) at 10/19/2024 1214  Last data filed at 10/19/2024 0254  Gross per 24 hour   Intake 1560 ml   Output --   Net 1560 ml     Diet: Liquid; Clear Liquid; Fluid Consistency: Thin (IDDSI 0)  ----------------------------------------------------------------------------------------------------------------------  Physical exam:  Constitutional: Well-nourished  male in no apparent distress.     HENT:  Head:  Normocephalic and atraumatic.  Mouth:  Moist mucous membranes.    Eyes:  Conjunctivae and EOM are normal.  Pupils are equal, round, and reactive to light.  No scleral  "icterus.    Neck:  Neck supple. No thyromegaly.  No JVD present.    Cardiovascular:  Regular rate and rhythm with no murmurs, rubs, clicks or gallops appreciated.  Pulmonary/Chest:  Clear to auscultation bilaterally with no crackles, wheezes or rhonchi appreciated.  Abdominal:  Soft. Nontender. Nondistended  Bowel sounds are normal in all four quadrants. No organomegally appreciated.   Musculoskeletal:  5/5 muscle strength bilateral upper and lower extremities with equal muscle tone and bulk. No edema, no tenderness, and no deformity.  No red or swollen joints anywhere.    Neurological:  Alert, Cranial nerves II-XII intact with no focal deficits.  No facial droop.  No slurred speech.   Skin:  Warm and dry to palpation with no rashes or lesions appreciated.  Peripheral vascular:  2+ radial and pedal pulses in bilateral upper and lower extremities.  Psychiatric:  Alert and oriented x3, demonstrates appropriate judgment and insight.    No significant change in physical exam in comparison to 10/18/2024  -------------------------------------------------------------------------  ----------------------------------------------------------------------------------------------------------------------      Results from last 7 days   Lab Units 10/19/24  0103 10/16/24  1512   WBC 10*3/mm3 14.76* 12.06*   HEMOGLOBIN g/dL 12.4* 14.4   HEMATOCRIT % 38.8 45.5   MCV fL 101.0* 99.8*   MCHC g/dL 32.0 31.6   PLATELETS 10*3/mm3 246 315         Results from last 7 days   Lab Units 10/19/24  0103 10/16/24  1512   SODIUM mmol/L 140 137   POTASSIUM mmol/L 3.8 3.9   CHLORIDE mmol/L 105 103   CO2 mmol/L 22.8 21.7*   BUN mg/dL 9 13   CREATININE mg/dL 0.81 0.81   CALCIUM mg/dL 8.8 9.8   GLUCOSE mg/dL 122* 103*   Estimated Creatinine Clearance: 127.3 mL/min (by C-G formula based on SCr of 0.81 mg/dL).    No results found for: \"AMMONIA\"      No results found for: \"BLOODCX\"  No results found for: \"URINECX\"  No results found for: \"WOUNDCX\"  No " "results found for: \"STOOLCX\"    I have personally looked at the labs and they are summarized above.  ----------------------------------------------------------------------------------------------------------------------  Imaging Results (Last 24 Hours)       ** No results found for the last 24 hours. **          ----------------------------------------------------------------------------------------------------------------------  Assessment and Plan:    COPD -no evidence of exacerbation at this time, continue to monitor closely to ensure O2 saturation greater than 90%.  Continue to supply nocturnal oxygen     2.  Type 2 diabetes mellitus -blood sugar well-controlled, continue sliding scale insulin with Accu-Cheks before every meal and nightly     3.  Hyperlipidemia -statin     4.  Essential hypertension -well-controlled, continue to monitor closely and make antihypertensive medication adjustments as needed     5.  Colostomy takedown -will defer all surgical care to primary team.    Disposition will continue to follow along while patient is hospitalized    Hugo David DO   10/19/24   12:14 EDT     "

## 2024-10-19 NOTE — PLAN OF CARE
Goal Outcome Evaluation:  Plan of Care Reviewed With: patient        Progress: improving  Outcome Evaluation: Pt's resting in bed, A&O, O2 at 2L n/c in use, IS in use, pain controlled with PRN meds, stated feel better. Pt tolerated regular diet, ambulated in hallway, sat up in chair, had BM. Con't with POC.

## 2024-10-19 NOTE — PROGRESS NOTES
"     LOS: 1 day   Patient Care Team:  Rachell Rivers APRN as PCP - General (Nurse Practitioner)  Gibran Salazar MD as Consulting Physician (Urology)    Post op day # 1, status post loop colostomy closure    Subjective     Interval History:  Patient states he is voiding and doing well.  Tolerating a clear liquid diet.  He states he did have a bowel movement last night    History taken from patient.      Objective     Vital Signs  Temp:  [97.3 °F (36.3 °C)-99 °F (37.2 °C)] 98.3 °F (36.8 °C)  Heart Rate:  [63-83] 63  Resp:  [16-20] 20  BP: (112-148)/(63-86) 136/82    Physical Exam:  This is a well-developed well-nourished overweight male in no acute distress  HEENT examination: Sclera are anicteric  Abdomen: Hypoactive bowel sounds are present  Skin/incisions: Surgical dressing intact and dry     Results Review:        Results from last 7 days   Lab Units 10/19/24  0103 10/16/24  1512   WBC 10*3/mm3 14.76* 12.06*   HEMOGLOBIN g/dL 12.4* 14.4   HEMATOCRIT % 38.8 45.5   PLATELETS 10*3/mm3 246 315         Results from last 7 days   Lab Units 10/19/24  0103 10/16/24  1512   SODIUM mmol/L 140 137   POTASSIUM mmol/L 3.8 3.9   CHLORIDE mmol/L 105 103   CO2 mmol/L 22.8 21.7*   BUN mg/dL 9 13   CREATININE mg/dL 0.81 0.81   CALCIUM mg/dL 8.8 9.8   GLUCOSE mg/dL 122* 103*   Estimated Creatinine Clearance: 127.3 mL/min (by C-G formula based on SCr of 0.81 mg/dL).  No results found for: \"AMMONIA\"      No results found for: \"BLOODCX\"  No results found for: \"URINECX\"  No results found for: \"WOUNDCX\"  No results found for: \"STOOLCX\"    Imaging:  Imaging Results (Last 24 Hours)       ** No results found for the last 24 hours. **             Impression:  Status post loop colostomy closure    Plan:  Advance to regular diet    Jayla Sequeira MD  10/19/24  11:05 EDT      Please note that portions of this note were completed with a voice recognition program.    "

## 2024-10-19 NOTE — PROGRESS NOTES
"     LOS: 1 day   Patient Care Team:  Rachell Rivers APRN as PCP - General (Nurse Practitioner)  Gibran Salazar MD as Consulting Physician (Urology)    Post op day # 1, status post loop colostomy closure    Subjective     Interval History:  Patient has done well.  He is ambulating in the [place].  He denies nausea or vomiting.  He states he has passed gas and had a bowel movement.    History taken from patient.      Objective     Vital Signs  Temp:  [97.8 °F (36.6 °C)-99 °F (37.2 °C)] 98.3 °F (36.8 °C)  Heart Rate:  [63-74] 63  Resp:  [16-20] 20  BP: (118-148)/(63-86) 136/82    Physical Exam:  This is a well-developed well-nourished male in no acute distress  HEENT examination: Sclera are anicteric  Lungs: Expiratory rhonchi  Abdomen: Active bowel sounds  Skin/incisions: I surgical dressing intact and dry   Results Review:        Results from last 7 days   Lab Units 10/19/24  0103 10/16/24  1512   WBC 10*3/mm3 14.76* 12.06*   HEMOGLOBIN g/dL 12.4* 14.4   HEMATOCRIT % 38.8 45.5   PLATELETS 10*3/mm3 246 315         Results from last 7 days   Lab Units 10/19/24  0103 10/16/24  1512   SODIUM mmol/L 140 137   POTASSIUM mmol/L 3.8 3.9   CHLORIDE mmol/L 105 103   CO2 mmol/L 22.8 21.7*   BUN mg/dL 9 13   CREATININE mg/dL 0.81 0.81   CALCIUM mg/dL 8.8 9.8   GLUCOSE mg/dL 122* 103*   Estimated Creatinine Clearance: 127.3 mL/min (by C-G formula based on SCr of 0.81 mg/dL).  No results found for: \"AMMONIA\"      No results found for: \"BLOODCX\"  No results found for: \"URINECX\"  No results found for: \"WOUNDCX\"  No results found for: \"STOOLCX\"    Imaging:  Imaging Results (Last 24 Hours)       ** No results found for the last 24 hours. **             Impression:  Stable course, status post colostomy closure    Plan:  Advance to regular diet    Jayla Sequeira MD  10/19/24  13:46 EDT      Please note that portions of this note were completed with a voice recognition program.    "

## 2024-10-19 NOTE — PLAN OF CARE
Goal Outcome Evaluation:  Plan of Care Reviewed With: patient        Progress: no change        No acute changes at this time will continue to monitor and follow current plan of care.

## 2024-10-20 PROCEDURE — 94761 N-INVAS EAR/PLS OXIMETRY MLT: CPT

## 2024-10-20 PROCEDURE — 99231 SBSQ HOSP IP/OBS SF/LOW 25: CPT | Performed by: INTERNAL MEDICINE

## 2024-10-20 PROCEDURE — 99024 POSTOP FOLLOW-UP VISIT: CPT | Performed by: SURGERY

## 2024-10-20 PROCEDURE — 94799 UNLISTED PULMONARY SVC/PX: CPT

## 2024-10-20 PROCEDURE — 94664 DEMO&/EVAL PT USE INHALER: CPT

## 2024-10-20 RX ORDER — ACETAMINOPHEN 325 MG/1
650 TABLET ORAL ONCE
Status: COMPLETED | OUTPATIENT
Start: 2024-10-21 | End: 2024-10-21

## 2024-10-20 RX ADMIN — ISOSORBIDE MONONITRATE 30 MG: 30 TABLET, EXTENDED RELEASE ORAL at 08:15

## 2024-10-20 RX ADMIN — HYDROCODONE BITARTRATE AND ACETAMINOPHEN 1 TABLET: 5; 325 TABLET ORAL at 20:24

## 2024-10-20 RX ADMIN — HYDROCODONE BITARTRATE AND ACETAMINOPHEN 1 TABLET: 5; 325 TABLET ORAL at 11:11

## 2024-10-20 RX ADMIN — IPRATROPIUM BROMIDE AND ALBUTEROL SULFATE 3 ML: .5; 2.5 SOLUTION RESPIRATORY (INHALATION) at 19:36

## 2024-10-20 RX ADMIN — HYDRALAZINE HYDROCHLORIDE 50 MG: 50 TABLET ORAL at 20:25

## 2024-10-20 RX ADMIN — IPRATROPIUM BROMIDE AND ALBUTEROL SULFATE 3 ML: .5; 2.5 SOLUTION RESPIRATORY (INHALATION) at 06:58

## 2024-10-20 RX ADMIN — HYDRALAZINE HYDROCHLORIDE 50 MG: 50 TABLET ORAL at 08:15

## 2024-10-20 RX ADMIN — IPRATROPIUM BROMIDE AND ALBUTEROL SULFATE 3 ML: .5; 2.5 SOLUTION RESPIRATORY (INHALATION) at 00:33

## 2024-10-20 RX ADMIN — ATORVASTATIN CALCIUM 40 MG: 40 TABLET, FILM COATED ORAL at 08:15

## 2024-10-20 RX ADMIN — SENNOSIDES AND DOCUSATE SODIUM 2 TABLET: 50; 8.6 TABLET ORAL at 20:24

## 2024-10-20 RX ADMIN — SERTRALINE 100 MG: 50 TABLET, FILM COATED ORAL at 08:15

## 2024-10-20 RX ADMIN — SENNOSIDES AND DOCUSATE SODIUM 2 TABLET: 50; 8.6 TABLET ORAL at 08:15

## 2024-10-20 RX ADMIN — CETIRIZINE HYDROCHLORIDE 10 MG: 10 TABLET, FILM COATED ORAL at 08:14

## 2024-10-20 RX ADMIN — IPRATROPIUM BROMIDE AND ALBUTEROL SULFATE 3 ML: .5; 2.5 SOLUTION RESPIRATORY (INHALATION) at 13:08

## 2024-10-20 RX ADMIN — Medication 10 ML: at 20:25

## 2024-10-20 RX ADMIN — METOPROLOL TARTRATE 25 MG: 25 TABLET, FILM COATED ORAL at 08:14

## 2024-10-20 RX ADMIN — ASPIRIN 81 MG: 81 TABLET, COATED ORAL at 08:15

## 2024-10-20 RX ADMIN — AMLODIPINE BESYLATE 10 MG: 10 TABLET ORAL at 08:15

## 2024-10-20 RX ADMIN — METOPROLOL TARTRATE 25 MG: 25 TABLET, FILM COATED ORAL at 20:24

## 2024-10-20 NOTE — PROGRESS NOTES
"     LOS: 2 days   Patient Care Team:  Rachell Rivers APRN as PCP - General (Nurse Practitioner)  Gibran Salazar MD as Consulting Physician (Urology)    Post op day # 2, status post loop colostomy closure    Subjective     Interval History:  Patient has done well.  He is ambulating in the [place].  He denies nausea or vomiting.  Is passing gas and had a bowel movement.  He is tolerating a regular diet.  History taken from patient.      Objective     Vital Signs  Temp:  [98.1 °F (36.7 °C)-99.3 °F (37.4 °C)] 98.1 °F (36.7 °C)  Heart Rate:  [63-80] 72  Resp:  [16-20] 18  BP: (114-160)/(60-93) 114/60    Physical Exam:  This is a well-developed well-nourished male in no acute distress  HEENT examination: Sclera are anicteric  Lungs: Expiratory rhonchi  Abdomen: Active bowel sounds  Skin/incisions: Incisions are clean and dry.  Small amount of packing in the lateral aspect of the incision.  Results Review:        Results from last 7 days   Lab Units 10/19/24  0103 10/16/24  1512   WBC 10*3/mm3 14.76* 12.06*   HEMOGLOBIN g/dL 12.4* 14.4   HEMATOCRIT % 38.8 45.5   PLATELETS 10*3/mm3 246 315         Results from last 7 days   Lab Units 10/19/24  0103 10/16/24  1512   SODIUM mmol/L 140 137   POTASSIUM mmol/L 3.8 3.9   CHLORIDE mmol/L 105 103   CO2 mmol/L 22.8 21.7*   BUN mg/dL 9 13   CREATININE mg/dL 0.81 0.81   CALCIUM mg/dL 8.8 9.8   GLUCOSE mg/dL 122* 103*   Estimated Creatinine Clearance: 127.3 mL/min (by C-G formula based on SCr of 0.81 mg/dL).  No results found for: \"AMMONIA\"      No results found for: \"BLOODCX\"  No results found for: \"URINECX\"  No results found for: \"WOUNDCX\"  No results found for: \"STOOLCX\"    Imaging:  Imaging Results (Last 24 Hours)       ** No results found for the last 24 hours. **             Impression:  Stable course, status post colostomy closure    Plan:  Patient states he does not want to go home today.  Wound care orders placed  IV narcotics discontinued    Jayla Sequeira, " MD  10/20/24  17:23 EDT      Please note that portions of this note were completed with a voice recognition program.

## 2024-10-20 NOTE — PROGRESS NOTES
[facility]     Patient Identification:  Name:  Celestine Arriaga  Age:  58 y.o.  Sex:  male  :  1965  MRN:  [id number]  Visit Number:  [id number]  Primary Care Provider:  Rachell Rivers APRN    Length of stay:  2    Chief complaint: Mild abdominal pain    Subjective:    Patient doing well today with no complaints.  Patient is tolerating advance diet.  Has ambulated in the halls with nursing staff.  No new events overnight.  ----------------------------------------------------------------------------------------------------------------------  [facility] Meds:  amLODIPine, 10 mg, Oral, Daily  aspirin, 81 mg, Oral, Daily  atorvastatin, 40 mg, Oral, Daily  cetirizine, 10 mg, Oral, Daily  [Held by provider] clopidogrel, 75 mg, Oral, Daily  hydrALAZINE, 50 mg, Oral, TID  ipratropium-albuterol, 3 mL, Nebulization, Q6H - RT  isosorbide mononitrate, 30 mg, Oral, Daily  metoprolol tartrate, 25 mg, Oral, BID  senna-docusate sodium, 2 tablet, Oral, BID  sertraline, 100 mg, Oral, Daily  sodium chloride, 10 mL, Intravenous, Q12H         ----------------------------------------------------------------------------------------------------------------------  Vital Signs:  Temp:  [98.1 °F (36.7 °C)-99.3 °F (37.4 °C)] 98.8 °F (37.1 °C)  Heart Rate:  [63-80] 72  Resp:  [16-20] 20  BP: (114-160)/(65-93) 119/70      10/18/24  1145   Weight: 115 kg (253 lb 11.2 oz)     Body mass index is 37.46 kg/m².    Intake/Output Summary (Last 24 hours) at 10/20/2024 1111  Last data filed at 10/20/2024 0700  Gross per 24 hour   Intake 1790 ml   Output --   Net 1790 ml     Diet: Regular/House, Diabetic; Consistent Carbohydrate; Fluid Consistency: Thin (IDDSI 0)  ----------------------------------------------------------------------------------------------------------------------  Physical exam:  Constitutional: Well-nourished  male in no apparent distress.     HENT:  Head:  Normocephalic  and atraumatic.  Mouth:  Moist mucous membranes.    Eyes:  Conjunctivae and EOM are normal.  Pupils are equal, round, and reactive to light.  No scleral icterus.    Neck:  Neck supple. No thyromegaly.  No JVD present.    Cardiovascular:  Regular rate and rhythm with no murmurs, rubs, clicks or gallops appreciated.  Pulmonary/Chest:  Clear to auscultation bilaterally with no crackles, wheezes or rhonchi appreciated.  Abdominal:  Soft. Nontender. Nondistended  Bowel sounds are normal in all four quadrants. No organomegally appreciated.   Musculoskeletal:  5/5 muscle strength bilateral upper and lower extremities with equal muscle tone and bulk. No edema, no tenderness, and no deformity.  No red or swollen joints anywhere.    Neurological:  Alert, Cranial nerves II-XII intact with no focal deficits.  No facial droop.  No slurred speech.   Skin:  Warm and dry to palpation with no rashes or lesions appreciated.  Peripheral vascular:  2+ radial and pedal pulses in bilateral upper and lower extremities.  Psychiatric:  Alert and oriented x3, demonstrates appropriate judgment and insight.    No significant change in physical exam in comparison to 10/19/2024  -------------------------------------------------------------------------  ----------------------------------------------------------------------------------------------------------------------      Results from last 7 days   Lab Units 10/19/24  0103 10/16/24  1512   WBC 10*3/mm3 14.76* 12.06*   HEMOGLOBIN g/dL 12.4* 14.4   HEMATOCRIT % 38.8 45.5   MCV fL 101.0* 99.8*   MCHC g/dL 32.0 31.6   PLATELETS 10*3/mm3 246 315         Results from last 7 days   Lab Units 10/19/24  0103 10/16/24  1512   SODIUM mmol/L 140 137   POTASSIUM mmol/L 3.8 3.9   CHLORIDE mmol/L 105 103   CO2 mmol/L 22.8 21.7*   BUN mg/dL 9 13   CREATININE mg/dL 0.81 0.81   CALCIUM mg/dL 8.8 9.8   GLUCOSE mg/dL 122* 103*   Estimated Creatinine Clearance: 127.3 mL/min (by C-G formula based on SCr of 0.81  "mg/dL).    No results found for: \"AMMONIA\"      No results found for: \"BLOODCX\"  No results found for: \"URINECX\"  No results found for: \"WOUNDCX\"  No results found for: \"STOOLCX\"    I have personally looked at the labs and they are summarized above.  ----------------------------------------------------------------------------------------------------------------------  Imaging Results (Last 24 Hours)       ** No results found for the last 24 hours. **          ----------------------------------------------------------------------------------------------------------------------  Assessment and Plan:    COPD -no evidence of exacerbation at this time, continue to monitor closely to ensure O2 saturation greater than 90%.  Continue to supply nocturnal oxygen     2.  Type 2 diabetes mellitus -blood sugar well-controlled, continue sliding scale insulin with Accu-Cheks before every meal and nightly     3.  Hyperlipidemia -statin     4.  Essential hypertension -well-controlled, continue to monitor closely and make antihypertensive medication adjustments as needed     5.  Colostomy takedown -will defer all surgical care to primary team.    Disposition will continue to follow along while patient is hospitalized    Hugo David DO   10/20/24   11:11 EDT     "

## 2024-10-20 NOTE — PLAN OF CARE
Goal Outcome Evaluation:  Plan of Care Reviewed With: patient        Progress: improving  Outcome Evaluation: Patient resting in bed at this time. VSS on 2L NC. PRN pain medication given for c/o pain. Patient has ambulated throughout the shift and tolerated well. Patient voices no concerns at this time. Will continue with plan of care.

## 2024-10-20 NOTE — PLAN OF CARE
Goal Outcome Evaluation:                         Pt resting in bed at this time. Wound care performed this shift. Prn pain meds given this shift. No other complaints at this time.

## 2024-10-21 VITALS
TEMPERATURE: 98.2 F | SYSTOLIC BLOOD PRESSURE: 149 MMHG | HEIGHT: 69 IN | OXYGEN SATURATION: 92 % | HEART RATE: 74 BPM | RESPIRATION RATE: 20 BRPM | DIASTOLIC BLOOD PRESSURE: 84 MMHG | BODY MASS INDEX: 37.58 KG/M2 | WEIGHT: 253.7 LBS

## 2024-10-21 PROCEDURE — 94761 N-INVAS EAR/PLS OXIMETRY MLT: CPT

## 2024-10-21 PROCEDURE — 99024 POSTOP FOLLOW-UP VISIT: CPT | Performed by: SURGERY

## 2024-10-21 PROCEDURE — 94664 DEMO&/EVAL PT USE INHALER: CPT

## 2024-10-21 PROCEDURE — 94799 UNLISTED PULMONARY SVC/PX: CPT

## 2024-10-21 RX ORDER — HYDROCODONE BITARTRATE AND ACETAMINOPHEN 7.5; 325 MG/1; MG/1
1 TABLET ORAL EVERY 6 HOURS PRN
Qty: 15 TABLET | Refills: 0 | Status: SHIPPED | OUTPATIENT
Start: 2024-10-21

## 2024-10-21 RX ADMIN — Medication 10 ML: at 08:01

## 2024-10-21 RX ADMIN — ACETAMINOPHEN 650 MG: 325 TABLET ORAL at 00:11

## 2024-10-21 RX ADMIN — IPRATROPIUM BROMIDE AND ALBUTEROL SULFATE 3 ML: .5; 2.5 SOLUTION RESPIRATORY (INHALATION) at 00:42

## 2024-10-21 RX ADMIN — ISOSORBIDE MONONITRATE 30 MG: 30 TABLET, EXTENDED RELEASE ORAL at 08:00

## 2024-10-21 RX ADMIN — AMLODIPINE BESYLATE 10 MG: 10 TABLET ORAL at 08:00

## 2024-10-21 RX ADMIN — IPRATROPIUM BROMIDE AND ALBUTEROL SULFATE 3 ML: .5; 2.5 SOLUTION RESPIRATORY (INHALATION) at 07:05

## 2024-10-21 RX ADMIN — HYDRALAZINE HYDROCHLORIDE 50 MG: 50 TABLET ORAL at 08:00

## 2024-10-21 RX ADMIN — HYDROCODONE BITARTRATE AND ACETAMINOPHEN 1 TABLET: 5; 325 TABLET ORAL at 04:58

## 2024-10-21 RX ADMIN — ASPIRIN 81 MG: 81 TABLET, COATED ORAL at 08:00

## 2024-10-21 RX ADMIN — ATORVASTATIN CALCIUM 40 MG: 40 TABLET, FILM COATED ORAL at 08:00

## 2024-10-21 RX ADMIN — HYDROCODONE BITARTRATE AND ACETAMINOPHEN 1 TABLET: 5; 325 TABLET ORAL at 08:57

## 2024-10-21 RX ADMIN — METOPROLOL TARTRATE 25 MG: 25 TABLET, FILM COATED ORAL at 08:00

## 2024-10-21 RX ADMIN — CETIRIZINE HYDROCHLORIDE 10 MG: 10 TABLET, FILM COATED ORAL at 08:00

## 2024-10-21 RX ADMIN — SERTRALINE 100 MG: 50 TABLET, FILM COATED ORAL at 08:00

## 2024-10-21 RX ADMIN — SENNOSIDES AND DOCUSATE SODIUM 2 TABLET: 50; 8.6 TABLET ORAL at 08:00

## 2024-10-21 NOTE — DISCHARGE INSTR - ACTIVITY
Wound Locations abdomen   Wound Care Instructions clean wound with saline once or twice a day and pack with plain guaze

## 2024-10-21 NOTE — NURSING NOTE
Pt educated on how to complete wound care on self. Pt was given written and oral instructions and was demonstrated how to accomplish the wound care. Pt was informed to completed wound care 2 times a day and was given supplies for such wound care. Pt voiced understanding and stated he would make sure he would completed the wound care as ordered.

## 2024-10-21 NOTE — CASE MANAGEMENT/SOCIAL WORK
Discharge Planning Assessment   [place]     Patient Name: Celestine Arriaga  MRN: [id number]  Today's Date: 10/21/2024    Admit Date: 10/18/2024          Discharge Plan       Row Name 10/21/24 0927       Plan    Final Note Pt is being d/c home. CM received message from lead NICOLASA Sarmiento that pt needs O2 tank. CM contacted pt and he uses [facility] for O2@2L cont. CM contacted [facility] per Shelia and they will deliver a portable oxygen tank to pt's room. Pt stated his friend will transport home. Lead NICOLASA Sarmiento informed.      Row Name 10/21/24 0904       Plan    Final Discharge Disposition Code 01 - home or self-care             Dixie Rose RN

## 2024-10-21 NOTE — PLAN OF CARE
Adult Nutrition  Assessment/PES    Patient Name:  She López  YOB: 1945  MRN: [id number]  Admit Date:  1/24/2020    Assessment Date:  2/3/2020    Comments:  Pt happier with food choices since changed to Regular diet. Remains on 1200 cc fluid restriction, serum Na 138 - wnl. Will continue to monitor.     Reason for Assessment     Row Name 02/03/20 1052          Reason for Assessment    Reason For Assessment  follow-up protocol         Nutrition/Diet History     Row Name 02/03/20 1052          Nutrition/Diet History    Typical Food/Fluid Intake  Visited with pt - says she's been getting more variety on menus, happy with choices. Tolerating diet fine & eating well most meals.            Labs/Tests/Procedures/Meds     Row Name 02/03/20 1053          Labs/Procedures/Meds    Lab Results Reviewed  reviewed     Lab Results Comments  Na 138, Glu, PLTs        Diagnostic Tests/Procedures    Diagnostic Test/Procedure Reviewed  reviewed        Medications    Pertinent Medications Reviewed  reviewed         Physical Findings     Row Name 02/03/20 1056          Physical Findings    Gastrointestinal  ileostomy     Skin  surgical incision abdomen           Nutrition Prescription Ordered     Row Name 02/03/20 1056          Nutrition Prescription PO    Current PO Diet  Regular     Fluid Consistency  Thin     Common Modifiers  Fluid Restriction     Fluid Restriction mL per Day  Other (comment) 1200 cc         Evaluation of Received Nutrient/Fluid Intake     Row Name 02/03/20 1056          PO Evaluation    % PO Intake  % - improving               Problem/Interventions:          Intervention Goal     Row Name 02/03/20 1057          Intervention Goal    General  Maintain nutrition     PO  Continue positive trend     PO Intake %  75 %     Weight  Maintain weight         Nutrition Intervention     Row Name 02/03/20 1057          Nutrition Intervention    RD/Tech Action  Follow Tx progress;Care plan  Goal Outcome Evaluation:              Outcome Evaluation: Pt being discharged home.                              reviewd;Encourage intake;Menu provided           Education/Evaluation     Row Name 02/03/20 1057          Monitor/Evaluation    Monitor  Per protocol           Electronically signed by:  Lisa Phoenix RD  02/03/20 10:59 AM

## 2024-10-21 NOTE — DISCHARGE SUMMARY
Date of Discharge:  10/21/2024    Discharge Diagnosis: colostomy closure    Presenting Problem/History of Present Illness  Active Hospital Problems    Diagnosis  POA    **Status post colon resection [Z90.49]  Not Applicable    S/P colostomy takedown [Z98.890]  Not Applicable      Resolved Hospital Problems   No resolved problems to display.           Hospital Course  Patient is a 58 y.o. male presented for closure of a loop colostomy done when he had a sigmoid colectomy for a colovesical fistula. He had a uneventful procedure and did well post op. He was taking a regular diet and was passing gas and stool at discharge. His wound was being packed.      Procedures Performed    Procedure(s):  COLOSTOMY TAKEDOWN OR CLOSURE  -------------------       Consults:   Consults       Date and Time Order Name Status Description    10/18/2024 11:41 AM Inpatient Hospitalist Consult              Pertinent Test Results:       Condition on Discharge:  stable    Vital Signs  Temp:  [98.1 °F (36.7 °C)-99.6 °F (37.6 °C)] 98.2 °F (36.8 °C)  Heart Rate:  [69-86] 74  Resp:  [16-20] 20  BP: (114-153)/(60-87) 149/84    Physical Exam:     General Appearance:  Alert, cooperative, in no acute distress   Head:  Normocephalic, without obvious abnormality, atraumatic   Eyes:  Lids and lashes normal, conjunctivae and sclerae normal, no icterus, no pallor, corneas clear, PERRLA   Ears:  Ears appear intact with no abnormalities noted   Throat:  No oral lesions, no thrush, oral mucosa moist   Neck:  No adenopathy, supple, trachea midline, no thyromegaly, no carotid bruit, no JVD   Back:  No kyphosis present, no scoliosis present, no skin lesions, erythema or scars, no tenderness to percussion or palpation, range of motion normal   Lungs:  Clear to auscultation, respirations regular, even and unlabored    Heart:  Regular rhythm and normal rate, normal S1 and S2, no murmur, no gallop, no rub, no click   Chest Wall:  No abnormalities observed    Abdomen:  Normal bowel sounds, no masses, no organomegaly, soft non-tender, non-distended, no guarding, no rebound tenderness   Rectal:  Deferred   Extremities:  Moves all extremities well, no edema, no cyanosis, no redness   Pulses:  Pulses palpable and equal bilaterally   Skin:  No bleeding, bruising or rash   Lymph nodes:  No palpable adenopathy   Neurologic:  Cranial nerves 2 - 12 grossly intact, sensation intact, DTR present and equal bilaterally                                                                               Discharge Disposition  Home or Self Care    Discharge Medications     Discharge Medications        New Medications        Instructions Start Date   HYDROcodone-acetaminophen 7.5-325 MG per tablet  Commonly known as: NORCO   1 tablet, Oral, Every 6 Hours PRN             Continue These Medications        Instructions Start Date   acetaminophen 500 MG tablet  Commonly known as: TYLENOL   500 mg, Every 6 Hours PRN      albuterol sulfate  (90 Base) MCG/ACT inhaler  Commonly known as: PROVENTIL HFA;VENTOLIN HFA;PROAIR HFA   1 puff, Every 6 Hours PRN      amLODIPine 10 MG tablet  Commonly known as: NORVASC   10 mg, Daily      aspirin 81 MG EC tablet   81 mg, Daily      atorvastatin 40 MG tablet  Commonly known as: LIPITOR   40 mg, Daily      clopidogrel 75 MG tablet  Commonly known as: PLAVIX   75 mg, Daily      erythromycin base 500 MG tablet  Commonly known as: E-MYCIN   Take 2 tablets by mouth at 2 pm, 3 pm, & 10 pm the day of prep.      fluticasone 50 MCG/ACT nasal spray  Commonly known as: FLONASE   1 spray, Daily      hydrALAZINE 50 MG tablet  Commonly known as: APRESOLINE   50 mg, 3 Times Daily      isosorbide mononitrate 30 MG 24 hr tablet  Commonly known as: IMDUR   30 mg, Daily      loratadine 10 MG tablet  Commonly known as: CLARITIN   10 mg, Daily      metoprolol tartrate 25 MG tablet  Commonly known as: LOPRESSOR   25 mg, Oral, 2 Times Daily      neomycin 500 MG  tablet  Commonly known as: MYCIFRADIN   Take 2 tablets by mouth at 2 pm, 3 pm, & 10 pm the day of prep.      nitroglycerin 0.4 MG SL tablet  Commonly known as: NITROSTAT   0.4 mg, Sublingual, Every 5 Minutes PRN, Take no more than 3 doses in 15 minutes.      Ozempic (0.25 or 0.5 MG/DOSE) 2 MG/3ML solution pen-injector  Generic drug: Semaglutide(0.25 or 0.5MG/DOS)   INJECT 0.25 MG  SUBCUTANEOUSLY ONCE A WEEK      sertraline 100 MG tablet  Commonly known as: ZOLOFT   100 mg, Daily               Discharge Diet:     Activity at Discharge:   Activity Instructions       Activity as tolerated              Follow-up Appointments  Future Appointments   Date Time Provider Department Center   10/28/2024 10:10 AM Boogie Hays MD [facility]     Additional Instructions for the Follow-ups that You Need to Schedule       Discharge Follow-up with Specialty: 1 Week   As directed      Follow Up: 1 Week                Test Results Pending at Discharge       Boogie Hays MD  10/21/24  08:40 EDT    Time:

## 2024-10-21 NOTE — PLAN OF CARE
Goal Outcome Evaluation:  Plan of Care Reviewed With: patient        Progress: improving  Outcome Evaluation: Patient resting in bed at this time. VSS on 2L NC. Patient had c/o pain this shift. PRN medication given per orders. Wound care completed per orders. Patient voices no concerns at this time. Will continue with plan of care.

## 2024-10-23 ENCOUNTER — TELEPHONE (OUTPATIENT)
Dept: SURGERY | Facility: CLINIC | Age: 59
End: 2024-10-23
Payer: COMMERCIAL

## 2024-10-23 NOTE — TELEPHONE ENCOUNTER
Patient is needing help with post op wound care. [facility] can only go out once a week. I called the Outpatient Wound Clinic here at [facility]. They will see the patient tomorrow at 9 am.   I called the patient's PCP and spoke to Joselyn and she said the patient was in the office at the moment for them to do his wound care, and she would let the patient know to be here at 9 am for his outpatient wound care.     TS

## 2024-10-29 ENCOUNTER — OFFICE VISIT (OUTPATIENT)
Dept: SURGERY | Facility: CLINIC | Age: 59
End: 2024-10-29
Payer: COMMERCIAL

## 2024-10-29 VITALS — WEIGHT: 253 LBS | BODY MASS INDEX: 37.47 KG/M2 | HEIGHT: 69 IN

## 2024-10-29 DIAGNOSIS — Z98.890 S/P COLOSTOMY TAKEDOWN: Primary | ICD-10-CM

## 2024-10-29 PROCEDURE — 99024 POSTOP FOLLOW-UP VISIT: CPT | Performed by: SURGERY

## 2024-10-29 RX ORDER — FLUCONAZOLE 150 MG/1
TABLET ORAL
COMMUNITY
Start: 2024-10-28

## 2024-10-29 NOTE — PROGRESS NOTES
Subjective   Celestine Arriaga is a 58 y.o. male.     Chief Complaint: post colostomy closure    History of Present Illness He is a obese 59 yo smoker who had a colostomy post resection of a sigmoid diverticular colovesical fistula. He also has COPD and is on oxygen. His wound was to be packed at home but initially he did not do it, and ended up going to wound care.     The following portions of the patient's history were reviewed and updated as appropriate: current medications, past family history, past medical history, past social history, past surgical history and problem list.    Review of Systems    Objective   Physical Exam RUQ wound is being packed.    Past Medical History:   Diagnosis Date    Arthritis     Chest pain     CHF (congestive heart failure)     COPD (chronic obstructive pulmonary disease)     Coronary artery disease     Diabetes mellitus     Diverticulitis     Elevated cholesterol     GERD (gastroesophageal reflux disease)     [place] (hard of hearing)     Hypertension     MI (myocardial infarction)     cardiac dr told him he had had a mild attack    Neuropathy     LOWER LEGS/FEET    Urinary tract infection        Family History   Problem Relation Age of Onset    No Known Problems Father     Cancer Mother        Social History     Tobacco Use    Smoking status: Every Day     Current packs/day: 1.50     Average packs/day: 1.5 packs/day for 40.8 years (61.2 ttl pk-yrs)     Types: Cigarettes     Start date: 1984     Passive exposure: Current    Smokeless tobacco: Former     Types: Chew   Vaping Use    Vaping status: Never Used   Substance Use Topics    Alcohol use: Yes     Alcohol/week: 24.0 standard drinks of alcohol     Types: 14 Cans of beer, 10 Shots of liquor per week    Drug use: Never       Past Surgical History:   Procedure Laterality Date    APPENDECTOMY N/A 08/02/2024    Procedure: APPENDECTOMY;  Surgeon: Boogie Hays MD;  Location: [facility];  Service: General;  Laterality: N/A;    BACK  SURGERY      with instrumentation    CARDIAC CATHETERIZATION      CARDIAC SURGERY      COLON RESECTION N/A 08/02/2024    Procedure: COLON RESECTION SIGMOID;  Surgeon: Boogie Hays MD;  Location:  [facility];  Service: General;  Laterality: N/A;    COLONOSCOPY      COLONOSCOPY N/A 07/18/2024    Procedure: COLONOSCOPY;  Surgeon: Boogie Hays MD;  Location:  [facility];  Service: Gastroenterology;  Laterality: N/A;    COLOSTOMY N/A 08/02/2024    Procedure: COLOSTOMY LOOP;  Surgeon: Boogie Hays MD;  Location:  [facility];  Service: General;  Laterality: N/A;    COLOSTOMY CLOSURE N/A 10/18/2024    Procedure: COLOSTOMY TAKEDOWN OR CLOSURE;  Surgeon: Boogie Hays MD;  Location:  [facility];  Service: General;  Laterality: N/A;    CORONARY ANGIOPLASTY WITH STENT PLACEMENT      3 STENTS TOTAL    CYSTOSCOPY Bilateral 09/09/2024    Procedure: CYSTOSCOPY stent removal bilaterally;  Surgeon: Gibran Salazar MD;  Location: [facility] OR;  Service: Urology;  Laterality: Bilateral;    CYSTOSCOPY W/ URETERAL STENT PLACEMENT Bilateral 08/02/2024    Procedure: URETEROSCOPY URETERAL CATHETER/STENT INSERTION;  Surgeon: Boogie Hays MD;  Location: [facility] OR;  Service: General;  Laterality: Bilateral;    EYE SURGERY Right     TOE AMPUTATION Left     great toe in lawn mower accident    VASCULAR SURGERY         Current Outpatient Medications   Medication Instructions    acetaminophen (TYLENOL) 500 mg, Every 6 Hours PRN    albuterol sulfate  (90 Base) MCG/ACT inhaler 1 puff, Every 6 Hours PRN    amLODIPine (NORVASC) 10 mg, Daily    aspirin 81 mg, Daily    atorvastatin (LIPITOR) 40 mg, Daily    clopidogrel (PLAVIX) 75 mg, Daily    erythromycin base (E-MYCIN) 500 MG tablet Take 2 tablets by mouth at 2 pm, 3 pm, & 10 pm the day of prep.    fluticasone (FLONASE) 50 MCG/ACT nasal spray 1 spray, Daily    hydrALAZINE (APRESOLINE) 50 mg, 3 Times Daily    HYDROcodone-acetaminophen (NORCO) 7.5-325 MG per tablet 1 tablet, Oral,  Every 6 Hours PRN    isosorbide mononitrate (IMDUR) 30 mg, Daily    loratadine (CLARITIN) 10 mg, Daily    metoprolol tartrate (LOPRESSOR) 25 mg, Oral, 2 Times Daily    neomycin (MYCIFRADIN) 500 MG tablet Take 2 tablets by mouth at 2 pm, 3 pm, & 10 pm the day of prep.    nitroglycerin (NITROSTAT) 0.4 mg, Sublingual, Every 5 Minutes PRN, Take no more than 3 doses in 15 minutes.    Ozempic, 0.25 or 0.5 MG/DOSE, 2 MG/3ML solution pen-injector INJECT 0.25 MG  SUBCUTANEOUSLY ONCE A WEEK    sertraline (ZOLOFT) 100 mg, Daily         Assessment & Plan   Diagnoses and all orders for this visit:    1. S/P colostomy takedown (Primary)    Return 1 mo, continue wound care             This document has been electronically signed by Boogie Hays MD   October 29, 2024 09:00 EDT   0 = independent

## 2024-12-06 ENCOUNTER — TELEPHONE (OUTPATIENT)
Dept: SURGERY | Facility: CLINIC | Age: 59
End: 2024-12-06
Payer: COMMERCIAL

## 2024-12-06 NOTE — TELEPHONE ENCOUNTER
Patient is to remain off of work until he can be seen in the office on 12/17 with Dr. Hays. He will bring his FMLA by Monday for it to be updated.

## 2024-12-17 ENCOUNTER — OFFICE VISIT (OUTPATIENT)
Dept: SURGERY | Facility: CLINIC | Age: 59
End: 2024-12-17
Payer: COMMERCIAL

## 2024-12-17 VITALS — BODY MASS INDEX: 37.47 KG/M2 | HEIGHT: 69 IN | WEIGHT: 253 LBS

## 2024-12-17 DIAGNOSIS — Z98.890 S/P COLOSTOMY TAKEDOWN: Primary | ICD-10-CM

## 2024-12-17 NOTE — PROGRESS NOTES
Subjective   Celestine Arriaga is a 59 y.o. male.     Chief Complaint: post colostomy closure    History of Present Illness He is a 60 yo obese smoker who had a perforated sigmoid several months ago and had a sigmoid resection with loop colostomy in the RUQ. It was closed a few weeks ago. The wound was packed and had drainage for a while. It is     The following portions of the patient's history were reviewed and updated as appropriate: current medications, past family history, past medical history, past social history, past surgical history and problem list.    Review of Systems    Objective   Physical Exam wound is all closed with no drainage or redness    Past Medical History:   Diagnosis Date    Arthritis     Chest pain     CHF (congestive heart failure)     COPD (chronic obstructive pulmonary disease)     Coronary artery disease     Diabetes mellitus     Diverticulitis     Elevated cholesterol     GERD (gastroesophageal reflux disease)     [place] (hard of hearing)     Hypertension     MI (myocardial infarction)     cardiac dr told him he had had a mild attack    Neuropathy     LOWER LEGS/FEET    Urinary tract infection        Family History   Problem Relation Age of Onset    No Known Problems Father     Cancer Mother        Social History     Tobacco Use    Smoking status: Every Day     Current packs/day: 1.50     Average packs/day: 1.5 packs/day for 41.0 years (61.4 ttl pk-yrs)     Types: Cigarettes     Start date: 1984     Passive exposure: Current    Smokeless tobacco: Former     Types: Chew   Vaping Use    Vaping status: Never Used   Substance Use Topics    Alcohol use: Yes     Alcohol/week: 24.0 standard drinks of alcohol     Types: 14 Cans of beer, 10 Shots of liquor per week    Drug use: Never       Past Surgical History:   Procedure Laterality Date    APPENDECTOMY N/A 08/02/2024    Procedure: APPENDECTOMY;  Surgeon: Boogie Hays MD;  Location: [facility];  Service: General;  Laterality: N/A;    BACK SURGERY       with instrumentation    CARDIAC CATHETERIZATION      CARDIAC SURGERY      COLON RESECTION N/A 08/02/2024    Procedure: COLON RESECTION SIGMOID;  Surgeon: Boogie Hays MD;  Location:  [facility];  Service: General;  Laterality: N/A;    COLONOSCOPY      COLONOSCOPY N/A 07/18/2024    Procedure: COLONOSCOPY;  Surgeon: Boogie Hays MD;  Location:  [facility];  Service: Gastroenterology;  Laterality: N/A;    COLOSTOMY N/A 08/02/2024    Procedure: COLOSTOMY LOOP;  Surgeon: Boogie Hays MD;  Location:  [facility];  Service: General;  Laterality: N/A;    COLOSTOMY CLOSURE N/A 10/18/2024    Procedure: COLOSTOMY TAKEDOWN OR CLOSURE;  Surgeon: Boogie Hays MD;  Location:  [facility];  Service: General;  Laterality: N/A;    CORONARY ANGIOPLASTY WITH STENT PLACEMENT      3 STENTS TOTAL    CYSTOSCOPY Bilateral 09/09/2024    Procedure: CYSTOSCOPY stent removal bilaterally;  Surgeon: Gibran Salazar MD;  Location: [facility] OR;  Service: Urology;  Laterality: Bilateral;    CYSTOSCOPY W/ URETERAL STENT PLACEMENT Bilateral 08/02/2024    Procedure: URETEROSCOPY URETERAL CATHETER/STENT INSERTION;  Surgeon: Boogie Hays MD;  Location: [facility] OR;  Service: General;  Laterality: Bilateral;    EYE SURGERY Right     TOE AMPUTATION Left     great toe in  accident    VASCULAR SURGERY         Current Outpatient Medications   Medication Instructions    acetaminophen (TYLENOL) 500 mg, Every 6 Hours PRN    albuterol sulfate  (90 Base) MCG/ACT inhaler 1 puff, Every 6 Hours PRN    amLODIPine (NORVASC) 10 mg, Daily    amoxicillin-clavulanate (AUGMENTIN) 875-125 MG per tablet 1 tablet    aspirin 81 mg, Daily    atorvastatin (LIPITOR) 40 mg, Daily    clopidogrel (PLAVIX) 75 mg, Daily    erythromycin base (E-MYCIN) 500 MG tablet Take 2 tablets by mouth at 2 pm, 3 pm, & 10 pm the day of prep.    fluconazole (DIFLUCAN) 150 MG tablet     fluticasone (FLONASE) 50 MCG/ACT nasal spray 1 spray, Daily    hydrALAZINE  (APRESOLINE) 50 mg, 3 Times Daily    HYDROcodone-acetaminophen (NORCO) 7.5-325 MG per tablet 1 tablet, Oral, Every 6 Hours PRN    isosorbide mononitrate (IMDUR) 30 mg, Daily    loratadine (CLARITIN) 10 mg, Daily    metoprolol tartrate (LOPRESSOR) 25 mg, 2 Times Daily    neomycin (MYCIFRADIN) 500 MG tablet Take 2 tablets by mouth at 2 pm, 3 pm, & 10 pm the day of prep.    nitroglycerin (NITROSTAT) 0.4 mg, Every 5 Minutes PRN    Ozempic, 0.25 or 0.5 MG/DOSE, 2 MG/3ML solution pen-injector INJECT 0.25 MG  SUBCUTANEOUSLY ONCE A WEEK    sertraline (ZOLOFT) 100 mg, Daily         Assessment & Plan   Diagnoses and all orders for this visit:    1. S/P colostomy takedown (Primary)    Return in 6 months             This document has been electronically signed by Boogie Hays MD   December 17, 2024 09:03 EST

## 2025-01-07 ENCOUNTER — TELEPHONE (OUTPATIENT)
Dept: SURGERY | Facility: CLINIC | Age: 60
End: 2025-01-07

## 2025-01-07 NOTE — TELEPHONE ENCOUNTER
Caller: CLAU    Relationship: [employer]    Best call back number: [phone]    What form or medical record are you requesting: OFFICE NOTES 12-17-24    Who is requesting this form or medical record from you: [employer]    How would you like to receive the form or medical records (pick-up, mail, fax): FAX  If fax, what is the fax number: [phone]  I    Timeframe paperwork needed: ASAP    Additional notes: CLAU RECEIVED DIS/FMLA PAPERWORK BUT DID NOT RECEIVE OFFICE NOTES FOR 12-17-24. PLEASE FAX

## (undated) DEVICE — SUT VIC 2/0 TIES 18IN J111T

## (undated) DEVICE — DRSNG WND BORDR/ADHS NONADHR/GZ LF 4X10IN STRL

## (undated) DEVICE — MEDI-VAC YANKAUER SUCTION HANDLE W/BULBOUS TIP: Brand: CARDINAL HEALTH

## (undated) DEVICE — SUT PDS 1 TP1 48IN Z880G BX/12

## (undated) DEVICE — DRN WND HUBLSS FLUT FULL PERF SIL10MM

## (undated) DEVICE — PENCL SMOKE/EVAC MEGADYNE TELESCP 10FT

## (undated) DEVICE — HOLDER: Brand: DEROYAL

## (undated) DEVICE — SPNG GZ WOVN 4X4IN 12PLY 10/BX STRL

## (undated) DEVICE — ENDOGATOR AUXILIARY WATER JET CONNECTOR: Brand: ENDOGATOR

## (undated) DEVICE — COR CYSTO: Brand: MEDLINE INDUSTRIES, INC.

## (undated) DEVICE — DBD-DRAPE,LAP,CHOLE,W/TROUGHS,STERILE: Brand: MEDLINE

## (undated) DEVICE — SUT PROLN 3/0 8832H

## (undated) DEVICE — CATH FOL BARDEX 2WY 26F 30CC

## (undated) DEVICE — DRAPE,UTILTY,TAPE,15X26, 4EA/PK: Brand: MEDLINE

## (undated) DEVICE — DRSNG WND BORDR/ADHS NONADHR/GZ LF 4X14IN STRL

## (undated) DEVICE — 1/4 IN. X 12 IN. LENGTH: Brand: SILICONE TUBING, PENROSE DRAIN

## (undated) DEVICE — KT PUMP PAINBUST 3D 5 DUAL 270X4ML/H

## (undated) DEVICE — SUT VIC 3/0 SH CR8 27IN DYED J784G

## (undated) DEVICE — PATIENT RETURN ELECTRODE, SINGLE-USE, CONTACT QUALITY MONITORING, ADULT, WITH 9FT CORD, FOR PATIENTS WEIGING OVER 33LBS. (15KG): Brand: MEGADYNE

## (undated) DEVICE — GLV SURG TRIUMPH MICRO PF LTX 8 STRL

## (undated) DEVICE — PROXIMATE RH ROTATING HEAD SKIN STAPLERS (35 WIDE) CONTAINS 35 STAINLESS STEEL STAPLES: Brand: PROXIMATE

## (undated) DEVICE — PK BASIC 70

## (undated) DEVICE — SUT SILK 2/0 FS BLK 18IN 685G

## (undated) DEVICE — Device: Brand: DEFENDO AIR/WATER/SUCTION AND BIOPSY VALVE

## (undated) DEVICE — DRSNG PAD ABD 8X10IN STRL

## (undated) DEVICE — SUT ETHLN 3/0 FS1 663G

## (undated) DEVICE — GLV SURG PREMIERPRO MIC LTX PF SZ7.5 BRN

## (undated) DEVICE — TOTAL TRAY, 16FR 10ML SIL FOLEY, URN: Brand: MEDLINE

## (undated) DEVICE — POOLE SUCTION INSTRUMENT WITH REMOVABLE SHEATH: Brand: POOLE

## (undated) DEVICE — 4-PORT MANIFOLD: Brand: NEPTUNE 2

## (undated) DEVICE — Device

## (undated) DEVICE — JACKSON-PRATT 100CC BULB RESERVOIR: Brand: CARDINAL HEALTH

## (undated) DEVICE — SUT VIC 3/0 TIES J104T

## (undated) DEVICE — CATHETER,URETHRAL,REDRUBBER,STRL,18FR: Brand: MEDLINE

## (undated) DEVICE — SUT VIC 2/0 CT1 36IN

## (undated) DEVICE — GAUZE,PACKING STRIP,PLAIN,1/2"X5YD,STRL: Brand: CURAD

## (undated) DEVICE — SPNG LAP PREWSH SFTPK 18X18IN STRL PK/5

## (undated) DEVICE — ELECTRD BLD EZ CLN STD 6.5IN

## (undated) DEVICE — TOWEL,OR,DSP,ST,GREEN,DLX,XR,4/PK,20PK/C: Brand: MEDLINE

## (undated) DEVICE — SKIN PREP TRAY 4 COMPARTM TRAY: Brand: MEDLINE INDUSTRIES, INC.

## (undated) DEVICE — SYRINGE,TOOMEY,IRRIGATION,70CC,STERILE: Brand: MEDLINE

## (undated) DEVICE — KT CATH INFUS INFILTRALONG W/NDL FLX/HEL/COIL 60HL 19G 4.5IN

## (undated) DEVICE — WOUND RETRACTOR AND PROTECTOR: Brand: ALEXIS O WOUND PROTECTOR-RETRACTOR